# Patient Record
Sex: MALE | Race: WHITE | NOT HISPANIC OR LATINO | URBAN - METROPOLITAN AREA
[De-identification: names, ages, dates, MRNs, and addresses within clinical notes are randomized per-mention and may not be internally consistent; named-entity substitution may affect disease eponyms.]

---

## 2017-09-13 ENCOUNTER — INPATIENT (INPATIENT)
Facility: HOSPITAL | Age: 57
LOS: 8 days | Discharge: HOME | End: 2017-09-22
Attending: INTERNAL MEDICINE | Admitting: INTERNAL MEDICINE
Payer: COMMERCIAL

## 2017-09-13 VITALS — WEIGHT: 315 LBS | HEIGHT: 60 IN

## 2017-09-13 LAB
ADD ON TEST-SPECIMEN IN LAB: SIGNIFICANT CHANGE UP
ALBUMIN SERPL ELPH-MCNC: 3.1 G/DL — LOW (ref 3.3–5)
ALP SERPL-CCNC: 50 U/L — SIGNIFICANT CHANGE UP (ref 40–120)
ALT FLD-CCNC: 43 U/L — SIGNIFICANT CHANGE UP (ref 12–78)
ANION GAP SERPL CALC-SCNC: 7 MMOL/L — SIGNIFICANT CHANGE UP (ref 5–17)
APTT BLD: 28.4 SEC — SIGNIFICANT CHANGE UP (ref 27.5–37.4)
AST SERPL-CCNC: 21 U/L — SIGNIFICANT CHANGE UP (ref 15–37)
BASE EXCESS BLDV CALC-SCNC: -0.2 MMOL/L — SIGNIFICANT CHANGE UP (ref -2–2)
BASOPHILS # BLD AUTO: 0.1 K/UL — SIGNIFICANT CHANGE UP (ref 0–0.2)
BASOPHILS NFR BLD AUTO: 0.6 % — SIGNIFICANT CHANGE UP (ref 0–2)
BILIRUB SERPL-MCNC: 0.9 MG/DL — SIGNIFICANT CHANGE UP (ref 0.2–1.2)
BUN SERPL-MCNC: 19 MG/DL — SIGNIFICANT CHANGE UP (ref 7–23)
CALCIUM SERPL-MCNC: 8.1 MG/DL — LOW (ref 8.5–10.1)
CHLORIDE SERPL-SCNC: 108 MMOL/L — SIGNIFICANT CHANGE UP (ref 96–108)
CO2 SERPL-SCNC: 29 MMOL/L — SIGNIFICANT CHANGE UP (ref 22–31)
CREAT SERPL-MCNC: 1.18 MG/DL — SIGNIFICANT CHANGE UP (ref 0.5–1.3)
EOSINOPHIL # BLD AUTO: 0.1 K/UL — SIGNIFICANT CHANGE UP (ref 0–0.5)
EOSINOPHIL NFR BLD AUTO: 1 % — SIGNIFICANT CHANGE UP (ref 0–6)
GLUCOSE SERPL-MCNC: 132 MG/DL — HIGH (ref 70–99)
HCO3 BLDV-SCNC: 25 MMOL/L — SIGNIFICANT CHANGE UP (ref 21–29)
HCT VFR BLD CALC: 40.6 % — SIGNIFICANT CHANGE UP (ref 39–50)
HCT VFR BLD CALC: 43.7 % — SIGNIFICANT CHANGE UP (ref 39–50)
HGB BLD-MCNC: 12.9 G/DL — LOW (ref 13–17)
HGB BLD-MCNC: 14.1 G/DL — SIGNIFICANT CHANGE UP (ref 13–17)
INR BLD: 1.26 RATIO — HIGH (ref 0.88–1.16)
LYMPHOCYTES # BLD AUTO: 2.7 K/UL — SIGNIFICANT CHANGE UP (ref 1–3.3)
LYMPHOCYTES # BLD AUTO: 25.7 % — SIGNIFICANT CHANGE UP (ref 13–44)
MAGNESIUM SERPL-MCNC: 2.3 MG/DL — SIGNIFICANT CHANGE UP (ref 1.6–2.6)
MCHC RBC-ENTMCNC: 29.9 PG — SIGNIFICANT CHANGE UP (ref 27–34)
MCHC RBC-ENTMCNC: 30.4 PG — SIGNIFICANT CHANGE UP (ref 27–34)
MCHC RBC-ENTMCNC: 31.8 GM/DL — LOW (ref 32–36)
MCHC RBC-ENTMCNC: 32.2 GM/DL — SIGNIFICANT CHANGE UP (ref 32–36)
MCV RBC AUTO: 94.3 FL — SIGNIFICANT CHANGE UP (ref 80–100)
MCV RBC AUTO: 94.3 FL — SIGNIFICANT CHANGE UP (ref 80–100)
MONOCYTES # BLD AUTO: 0.7 K/UL — SIGNIFICANT CHANGE UP (ref 0–0.9)
MONOCYTES NFR BLD AUTO: 6.4 % — SIGNIFICANT CHANGE UP (ref 2–14)
NEUTROPHILS # BLD AUTO: 7.1 K/UL — SIGNIFICANT CHANGE UP (ref 1.8–7.4)
NEUTROPHILS NFR BLD AUTO: 66.3 % — SIGNIFICANT CHANGE UP (ref 43–77)
NT-PROBNP SERPL-SCNC: 1789 PG/ML — HIGH (ref 0–125)
PCO2 BLDV: 44 MMHG — SIGNIFICANT CHANGE UP (ref 35–50)
PH BLDV: 7.37 — SIGNIFICANT CHANGE UP (ref 7.35–7.45)
PHOSPHATE SERPL-MCNC: 3.5 MG/DL — SIGNIFICANT CHANGE UP (ref 2.5–4.5)
PLATELET # BLD AUTO: 215 K/UL — SIGNIFICANT CHANGE UP (ref 150–400)
PLATELET # BLD AUTO: 244 K/UL — SIGNIFICANT CHANGE UP (ref 150–400)
PO2 BLDV: 56 MMHG — HIGH (ref 25–45)
POTASSIUM SERPL-MCNC: 4.5 MMOL/L — SIGNIFICANT CHANGE UP (ref 3.5–5.3)
POTASSIUM SERPL-SCNC: 4.5 MMOL/L — SIGNIFICANT CHANGE UP (ref 3.5–5.3)
PROT SERPL-MCNC: 6.5 GM/DL — SIGNIFICANT CHANGE UP (ref 6–8.3)
PROTHROM AB SERPL-ACNC: 13.7 SEC — HIGH (ref 9.8–12.7)
RBC # BLD: 4.31 M/UL — SIGNIFICANT CHANGE UP (ref 4.2–5.8)
RBC # BLD: 4.64 M/UL — SIGNIFICANT CHANGE UP (ref 4.2–5.8)
RBC # FLD: 15.3 % — HIGH (ref 10.3–14.5)
RBC # FLD: 15.5 % — HIGH (ref 10.3–14.5)
SAO2 % BLDV: 84 % — SIGNIFICANT CHANGE UP (ref 67–88)
SODIUM SERPL-SCNC: 144 MMOL/L — SIGNIFICANT CHANGE UP (ref 135–145)
TROPONIN I SERPL-MCNC: 0.02 NG/ML — SIGNIFICANT CHANGE UP (ref 0.01–0.04)
TROPONIN I SERPL-MCNC: 0.02 NG/ML — SIGNIFICANT CHANGE UP (ref 0.01–0.04)
WBC # BLD: 10.6 K/UL — HIGH (ref 3.8–10.5)
WBC # BLD: 10.9 K/UL — HIGH (ref 3.8–10.5)
WBC # FLD AUTO: 10.6 K/UL — HIGH (ref 3.8–10.5)
WBC # FLD AUTO: 10.9 K/UL — HIGH (ref 3.8–10.5)

## 2017-09-13 PROCEDURE — 93010 ELECTROCARDIOGRAM REPORT: CPT

## 2017-09-13 PROCEDURE — 71010: CPT | Mod: 26

## 2017-09-13 PROCEDURE — 99291 CRITICAL CARE FIRST HOUR: CPT

## 2017-09-13 PROCEDURE — 93970 EXTREMITY STUDY: CPT | Mod: 26

## 2017-09-13 RX ORDER — DOCUSATE SODIUM 100 MG
100 CAPSULE ORAL THREE TIMES A DAY
Qty: 0 | Refills: 0 | Status: DISCONTINUED | OUTPATIENT
Start: 2017-09-13 | End: 2017-09-22

## 2017-09-13 RX ORDER — SODIUM CHLORIDE 9 MG/ML
3 INJECTION INTRAMUSCULAR; INTRAVENOUS; SUBCUTANEOUS ONCE
Qty: 0 | Refills: 0 | Status: COMPLETED | OUTPATIENT
Start: 2017-09-13 | End: 2017-09-13

## 2017-09-13 RX ORDER — HEPARIN SODIUM 5000 [USP'U]/ML
10000 INJECTION INTRAVENOUS; SUBCUTANEOUS EVERY 6 HOURS
Qty: 0 | Refills: 0 | Status: DISCONTINUED | OUTPATIENT
Start: 2017-09-13 | End: 2017-09-15

## 2017-09-13 RX ORDER — FUROSEMIDE 40 MG
40 TABLET ORAL
Qty: 0 | Refills: 0 | Status: DISCONTINUED | OUTPATIENT
Start: 2017-09-13 | End: 2017-09-22

## 2017-09-13 RX ORDER — HEPARIN SODIUM 5000 [USP'U]/ML
INJECTION INTRAVENOUS; SUBCUTANEOUS
Qty: 25000 | Refills: 0 | Status: DISCONTINUED | OUTPATIENT
Start: 2017-09-13 | End: 2017-09-15

## 2017-09-13 RX ORDER — HEPARIN SODIUM 5000 [USP'U]/ML
5000 INJECTION INTRAVENOUS; SUBCUTANEOUS EVERY 6 HOURS
Qty: 0 | Refills: 0 | Status: DISCONTINUED | OUTPATIENT
Start: 2017-09-13 | End: 2017-09-15

## 2017-09-13 RX ORDER — FUROSEMIDE 40 MG
40 TABLET ORAL
Qty: 0 | Refills: 0 | Status: DISCONTINUED | OUTPATIENT
Start: 2017-09-13 | End: 2017-09-13

## 2017-09-13 RX ORDER — ONDANSETRON 8 MG/1
4 TABLET, FILM COATED ORAL EVERY 6 HOURS
Qty: 0 | Refills: 0 | Status: DISCONTINUED | OUTPATIENT
Start: 2017-09-13 | End: 2017-09-22

## 2017-09-13 RX ORDER — DILTIAZEM HCL 120 MG
10 CAPSULE, EXT RELEASE 24 HR ORAL
Qty: 125 | Refills: 0 | Status: DISCONTINUED | OUTPATIENT
Start: 2017-09-13 | End: 2017-09-16

## 2017-09-13 RX ORDER — HEPARIN SODIUM 5000 [USP'U]/ML
10000 INJECTION INTRAVENOUS; SUBCUTANEOUS ONCE
Qty: 0 | Refills: 0 | Status: COMPLETED | OUTPATIENT
Start: 2017-09-13 | End: 2017-09-13

## 2017-09-13 RX ADMIN — Medication 40 MILLIGRAM(S): at 18:15

## 2017-09-13 RX ADMIN — HEPARIN SODIUM 10000 UNIT(S): 5000 INJECTION INTRAVENOUS; SUBCUTANEOUS at 16:59

## 2017-09-13 RX ADMIN — SODIUM CHLORIDE 3 MILLILITER(S): 9 INJECTION INTRAMUSCULAR; INTRAVENOUS; SUBCUTANEOUS at 15:51

## 2017-09-13 RX ADMIN — HEPARIN SODIUM 2400 UNIT(S)/HR: 5000 INJECTION INTRAVENOUS; SUBCUTANEOUS at 16:56

## 2017-09-13 RX ADMIN — Medication 10 MG/HR: at 15:48

## 2017-09-13 NOTE — ED STATDOCS - PROGRESS NOTE DETAILS
56 y/o male with a PMHx of obesity presents to the ED c/o SOB for 2 weeks.  He has recently lost a lot of weight and then gained it back.  +GREY.  He is holding onto a lot of fluid in trunk.  No daily meds.  PCP Dr. Harper.  NKDA.  Social drinker.  Nonsmoker.  EKG shows atrial fibrillation with RVRs, tachycardic, will transfer to main ED for further evaluation.

## 2017-09-13 NOTE — ED PROVIDER NOTE - CARE PLAN
Principal Discharge DX:	Atrial fibrillation with RVR  Secondary Diagnosis:	Acute congestive heart failure, unspecified congestive heart failure type

## 2017-09-13 NOTE — ED ADULT NURSE NOTE - OBJECTIVE STATEMENT
Pt is a 57y male, A & O x 3, presents to ED w/ SOB x 2 weeks , pt states he is retaining fluid, + bi lateral pedal edema, pt denies chest pain, pt was at PCP and EKG showed AFIB, pt neuro's intact, pt denies dizzness/weakness, pt in no apparent distress, will continue to monitor.

## 2017-09-13 NOTE — H&P ADULT - NSHPREVIEWOFSYSTEMS_GEN_ALL_CORE
(-)Fever, chills, cough, chest pain, headache, dizziness, palpitations, abd pain, n/v/d  (+)SOB, LE edema

## 2017-09-13 NOTE — H&P ADULT - HISTORY OF PRESENT ILLNESS
Patient is 58yo male with PMHx of morbid obesity, presents with SOB and Afib with RVR. Pt reports for the last 2 weeks despite a strict diet, he has noticed 15 lb weight gain, associated with worsening LE edema, and SOB at rest and on exertion. Pt denies fever, chills, chest pain, cough, palpitations, HA, dizziness. No other constitutional symptoms. In the Er patient noted to be in Afib with RVR 150s, give Cardizem IV  and placed drip, Heparin. Pt denies orthopnea, chest on exertion.

## 2017-09-13 NOTE — H&P ADULT - ASSESSMENT
56yo male a/w SOB, Afib with RVR    # SOB/Afib with RVR/Acute decompensated CHF, EF Uknown  - currently afebrile, HD stable, comfortable on 2LNC, -110s, on Cardizem 10mg/hr  - On exam lungs clear although LE edema 4+  - Duplex, r/o DVT  - ECHO  - Lasix IV BID  - Heparin drip, rate control with Cardizem drip  - low salt diet  - cardiology consult, Dr Francis  - cardiac enzymes neg x 2    # Obesity  - Check Lipid Panel, HgbA1C, TSH    # DVT ppx, Heparin drip    # Admit to tele

## 2017-09-13 NOTE — ED PROVIDER NOTE - MEDICAL DECISION MAKING DETAILS
Pt with new onset afib RVR and likely new onset CHF. Plan cardizem push and gtt, anticoagulate if not otherwise contraindicated, adm to tele

## 2017-09-13 NOTE — ED ADULT NURSE NOTE - CHPI ED SYMPTOMS NEG
no fever/no nausea/no dizziness/no syncope/no cough/no back pain/no chest pain/no vomiting/no chills/no diaphoresis

## 2017-09-13 NOTE — H&P ADULT - NSHPLABSRESULTS_GEN_ALL_CORE
CARDIAC MARKERS ( 13 Sep 2017 14:59 )  0.021 ng/mL / x     / x     / x     / x        EKG: Afib with RVR 150s, NSST changes    CXR: clear lungs                        14.1   10.6  )-----------( 244      ( 13 Sep 2017 14:59 )             43.7     13 Sep 2017 14:59    144    |  108    |  19     ----------------------------<  132    4.5     |  29     |  1.18     Ca    8.1        13 Sep 2017 14:59  Phos  3.5       13 Sep 2017 14:59  Mg     2.3       13 Sep 2017 14:59    TPro  6.5    /  Alb  3.1    /  TBili  0.9    /  DBili  x      /  AST  21     /  ALT  43     /  AlkPhos  50     13 Sep 2017 14:59    PT/INR - ( 13 Sep 2017 14:59 )   PT: 13.7 sec;   INR: 1.26 ratio         PTT - ( 13 Sep 2017 14:59 )  PTT:28.4 sec  CAPILLARY BLOOD GLUCOSE        LIVER FUNCTIONS - ( 13 Sep 2017 14:59 )  Alb: 3.1 g/dL / Pro: 6.5 gm/dL / ALK PHOS: 50 U/L / ALT: 43 U/L / AST: 21 U/L / GGT: x

## 2017-09-13 NOTE — ED PROVIDER NOTE - CARDIAC, MLM
Tachycardic rate, irregularly irregular rhythm.  3+ pitting edema b/l LE. Heart sounds S2.  No murmurs, rubs or gallops.

## 2017-09-13 NOTE — ED ADULT NURSE REASSESSMENT NOTE - NS ED NURSE REASSESS COMMENT FT1
Pt received in [facility]. Handoff given by LUIS Awad. Appears to be comfortable. A&Ox3. MAEX4. Breathing spontaneously on RA. No SOB or cough. VS as documented. PVL to B/L AC. Cardizem and Heparin drip in progress. Pt tolerating well. Pt obese. ABD soft, nondistended, nontender. +BS in all 4 quad. Voiding freely. Skin warm, dry, and intact. Pt to be admitted to tele. All needs are met and safety maintained. Will continue to monitor.
AS per MD Chairez, OK to keep Cardizem infusion at current rate 10/mg/hr

## 2017-09-13 NOTE — CONSULT NOTE ADULT - ASSESSMENT
Impression:  1. Congestive heart failure. Etiology to be determined. New onset.  2.Atrial fibrillation, new onset, rapid VR.  3.Possible hypertension  4.Morbid obesity.    Plan: Admit to the telemetry unit. Echocardiogram. Venous Doppler legs to r/o DVT. Consider nuclear stress testing.  Start IV furosemide, IV diltiazem and IV heparin.   Additional workup and treatment will depend on the results of testing and response to treatment already outlined.

## 2017-09-13 NOTE — ED PROVIDER NOTE - CRITICAL CARE PROVIDED
additional history taking/interpretation of diagnostic studies/consultation with other physicians/consult w/ pt's family directly relating to pts condition/documentation/direct patient care (not related to procedure)

## 2017-09-13 NOTE — ED PROVIDER NOTE - PROGRESS NOTE DETAILS
Attending Jina: Pt sent in from office with new onset afib RVR - given 20mg cardizem IVP followed by gtt at 10mL/hr. BP maintained throughout vasoactive infusions, pt denies any complaints. Heparin gtt started for full anticoagulation (pt not cardioversion candidate bc time of onset unknown). Cardiology cs with Dr Simpson initiated at request of Dr Clement. Pt will be admitted to tele

## 2017-09-13 NOTE — CONSULT NOTE ADULT - SUBJECTIVE AND OBJECTIVE BOX
Chief complaint: leg edema and shortness of breath.    HPI: 57 year old chronically obese man was well until about 2-4 weeks ago when he noted insidious onset of dyspnea on exertion and bilateral lower extremity edema. Over the past 2 weeks gained 15 pounds and developed bilateral weeping pedal edema and much worse dyspnea on exertion. He denies chest pain. His abdomen became bloated and expanded. He was evaluated tody by Dr. Clement as an outpatient and found to be in rapid atrial fibrillation and referred to the ED.   No prior history of heart failure, GREY, pedal edema. No history of MI, stroke, DM, hyperlipidemia, DVT, pulmonary embolism, arrhythmia. He has been told of borderline hypertension in the past but has never been on meds and not seen a physician for 10 years.     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 Drugs- none  Meds- none  allergies- NKDA  FH: MOther  83, CAD. Father  80, CAD 2 brothers and 1 sister alive and well-    PE: Alert and in no distress. /98 Pulse 110 irreg, irreg  HEENT- unremarkable  Neck- no bruit or JVD, no bruits. normal carotid upstrokes  Lungs- scant bibasilar crackles  Card- S1 and S2 normal. No murmur , rub or gallop  Abdomen- distended, non tender, massively obese, edema of the suprapubic area  Ext- 4+ weeping edema both legs. No calf tenderness.  Peripheral pulses-normal brachial,radial and femorals. Absent pedal pulses.  Neurological- no focal deficits    EKG- rapid atrial fibrillation,  BPM, nonspecific T abnormality  CXR- clear  LABS-  Troponin neg x 2. Pro BNP elevated.  Renal function normal

## 2017-09-13 NOTE — ED PROVIDER NOTE - CRITICAL CARE INDICATION, MLM
patient was critically ill... Patient was critically ill with a high probability of imminent or life threatening deterioration. Pt sent in from office with new onset afib RVR - given 20mg cardizem IVP followed by gtt at 10mL/hr. BP maintained throughout vasoactive infusions. Cardiology cs initiated in ED.

## 2017-09-13 NOTE — ED ADULT TRIAGE NOTE - CHIEF COMPLAINT QUOTE
pt sent to ED by Dr. Jason for SOB, CHF and rapid Afib. pt c/o SOB for the past 2 weeks, pt denies chest pain or dizziness. pt to be admitted

## 2017-09-13 NOTE — ED PROVIDER NOTE - OBJECTIVE STATEMENT
58 yo male h/o obesity, presents with gradually worsening SOB x 2 weeks. +Mild palpitations, edema in his trunk and legs. Has been on a calorie-restricted diet but notes weight gain recently. SOB is not worse when he lays flat. Denies CP, fever, N/V. Saw his PCP Dr. Clement today for the first time in 10 years. No daily meds. Seen in Intake, EKG shows rapid Afib.

## 2017-09-13 NOTE — PATIENT PROFILE ADULT. - NS TRANSFER PATIENT BELONGINGS
Electronic Device (specify)/sneakers, pants, shirt, underwear, iphone, ipad, microsoft surface, pads, paperwork, , bag,, toiletry bag,/Clothing/Cell Phone/PDA (specify)

## 2017-09-13 NOTE — H&P ADULT - NSHPPHYSICALEXAM_GEN_ALL_CORE
T(C): 36.5 (09-13-17 @ 14:18), Max: 36.5 (09-13-17 @ 14:18)  HR: 117 (09-13-17 @ 18:00) (79 - 128)  BP: 158/99 (09-13-17 @ 18:00) (126/70 - 158/99)  RR: 18 (09-13-17 @ 16:30) (17 - 20)  SpO2: 99% (09-13-17 @ 15:00) (96% - 100%)  Wt(kg): --    Gen: AAOx3, NAD, obese  HEENT: NCAT, EOMI  Neck: Supple, large  CV: nml S1S2, RRR  Lungs: CTABL  Abd: Soft, NT, ND, BS+  Ext: 4+ pitting edema b/l  Neuro: Non focal

## 2017-09-14 LAB
ANION GAP SERPL CALC-SCNC: 8 MMOL/L — SIGNIFICANT CHANGE UP (ref 5–17)
APTT BLD: 104.7 SEC — HIGH (ref 27.5–37.4)
APTT BLD: 75.9 SEC — HIGH (ref 27.5–37.4)
APTT BLD: 78.4 SEC — HIGH (ref 27.5–37.4)
APTT BLD: >200 SEC — CRITICAL HIGH (ref 27.5–37.4)
BASOPHILS # BLD AUTO: 0.2 K/UL — SIGNIFICANT CHANGE UP (ref 0–0.2)
BASOPHILS NFR BLD AUTO: 1.3 % — SIGNIFICANT CHANGE UP (ref 0–2)
BUN SERPL-MCNC: 17 MG/DL — SIGNIFICANT CHANGE UP (ref 7–23)
CALCIUM SERPL-MCNC: 8.4 MG/DL — LOW (ref 8.5–10.1)
CHLORIDE SERPL-SCNC: 104 MMOL/L — SIGNIFICANT CHANGE UP (ref 96–108)
CO2 SERPL-SCNC: 30 MMOL/L — SIGNIFICANT CHANGE UP (ref 22–31)
CREAT SERPL-MCNC: 1.08 MG/DL — SIGNIFICANT CHANGE UP (ref 0.5–1.3)
EOSINOPHIL # BLD AUTO: 0.3 K/UL — SIGNIFICANT CHANGE UP (ref 0–0.5)
EOSINOPHIL NFR BLD AUTO: 2.1 % — SIGNIFICANT CHANGE UP (ref 0–6)
GLUCOSE SERPL-MCNC: 110 MG/DL — HIGH (ref 70–99)
HBA1C BLD-MCNC: 6.2 % — HIGH (ref 4–5.6)
HCT VFR BLD CALC: 45.2 % — SIGNIFICANT CHANGE UP (ref 39–50)
HGB BLD-MCNC: 14.6 G/DL — SIGNIFICANT CHANGE UP (ref 13–17)
INR BLD: 1.23 RATIO — HIGH (ref 0.88–1.16)
INR BLD: 1.33 RATIO — HIGH (ref 0.88–1.16)
LYMPHOCYTES # BLD AUTO: 27.7 % — SIGNIFICANT CHANGE UP (ref 13–44)
LYMPHOCYTES # BLD AUTO: 3.4 K/UL — HIGH (ref 1–3.3)
MCHC RBC-ENTMCNC: 30 PG — SIGNIFICANT CHANGE UP (ref 27–34)
MCHC RBC-ENTMCNC: 32.3 GM/DL — SIGNIFICANT CHANGE UP (ref 32–36)
MCV RBC AUTO: 92.9 FL — SIGNIFICANT CHANGE UP (ref 80–100)
MONOCYTES # BLD AUTO: 0.9 K/UL — SIGNIFICANT CHANGE UP (ref 0–0.9)
MONOCYTES NFR BLD AUTO: 6.9 % — SIGNIFICANT CHANGE UP (ref 2–14)
NEUTROPHILS # BLD AUTO: 7.6 K/UL — HIGH (ref 1.8–7.4)
NEUTROPHILS NFR BLD AUTO: 62 % — SIGNIFICANT CHANGE UP (ref 43–77)
PLATELET # BLD AUTO: 235 K/UL — SIGNIFICANT CHANGE UP (ref 150–400)
POTASSIUM SERPL-MCNC: 3.5 MMOL/L — SIGNIFICANT CHANGE UP (ref 3.5–5.3)
POTASSIUM SERPL-SCNC: 3.5 MMOL/L — SIGNIFICANT CHANGE UP (ref 3.5–5.3)
PROTHROM AB SERPL-ACNC: 13.3 SEC — HIGH (ref 9.8–12.7)
PROTHROM AB SERPL-ACNC: 14.4 SEC — HIGH (ref 9.8–12.7)
RBC # BLD: 4.86 M/UL — SIGNIFICANT CHANGE UP (ref 4.2–5.8)
RBC # FLD: 15.9 % — HIGH (ref 10.3–14.5)
SODIUM SERPL-SCNC: 142 MMOL/L — SIGNIFICANT CHANGE UP (ref 135–145)
TSH SERPL-MCNC: 1.3 UIU/ML — SIGNIFICANT CHANGE UP (ref 0.36–3.74)
WBC # BLD: 12.3 K/UL — HIGH (ref 3.8–10.5)
WBC # FLD AUTO: 12.3 K/UL — HIGH (ref 3.8–10.5)

## 2017-09-14 PROCEDURE — 93016 CV STRESS TEST SUPVJ ONLY: CPT

## 2017-09-14 PROCEDURE — 93018 CV STRESS TEST I&R ONLY: CPT

## 2017-09-14 PROCEDURE — 93306 TTE W/DOPPLER COMPLETE: CPT | Mod: 26

## 2017-09-14 PROCEDURE — 93010 ELECTROCARDIOGRAM REPORT: CPT

## 2017-09-14 RX ORDER — DIPYRIDAMOLE 50 MG
60 TABLET ORAL ONCE
Qty: 0 | Refills: 0 | Status: COMPLETED | OUTPATIENT
Start: 2017-09-14 | End: 2017-09-14

## 2017-09-14 RX ORDER — INFLUENZA VIRUS VACCINE 15; 15; 15; 15 UG/.5ML; UG/.5ML; UG/.5ML; UG/.5ML
0.5 SUSPENSION INTRAMUSCULAR ONCE
Qty: 0 | Refills: 0 | Status: COMPLETED | OUTPATIENT
Start: 2017-09-14 | End: 2017-09-15

## 2017-09-14 RX ORDER — AMINOPHYLLINE 100 MG
100 TABLET ORAL ONCE
Qty: 0 | Refills: 0 | Status: DISCONTINUED | OUTPATIENT
Start: 2017-09-14 | End: 2017-09-22

## 2017-09-14 RX ADMIN — Medication 40 MILLIGRAM(S): at 04:59

## 2017-09-14 RX ADMIN — HEPARIN SODIUM 2000 UNIT(S)/HR: 5000 INJECTION INTRAVENOUS; SUBCUTANEOUS at 01:11

## 2017-09-14 RX ADMIN — HEPARIN SODIUM 1700 UNIT(S)/HR: 5000 INJECTION INTRAVENOUS; SUBCUTANEOUS at 22:30

## 2017-09-14 RX ADMIN — Medication 10 MG/HR: at 04:59

## 2017-09-14 RX ADMIN — HEPARIN SODIUM 0 UNIT(S)/HR: 5000 INJECTION INTRAVENOUS; SUBCUTANEOUS at 00:08

## 2017-09-14 RX ADMIN — Medication 1 TABLET(S): at 11:43

## 2017-09-14 RX ADMIN — Medication 40 MILLIGRAM(S): at 17:51

## 2017-09-14 RX ADMIN — HEPARIN SODIUM 1700 UNIT(S)/HR: 5000 INJECTION INTRAVENOUS; SUBCUTANEOUS at 07:44

## 2017-09-14 RX ADMIN — Medication 600 MILLIGRAM(S): at 09:00

## 2017-09-14 RX ADMIN — HEPARIN SODIUM 1700 UNIT(S)/HR: 5000 INJECTION INTRAVENOUS; SUBCUTANEOUS at 14:52

## 2017-09-14 RX ADMIN — Medication 10 MG/HR: at 19:22

## 2017-09-14 NOTE — PROGRESS NOTE ADULT - SUBJECTIVE AND OBJECTIVE BOX
Patient is a 57y old  Male who presents with a chief complaint of I was sick, I had trouble breathing, severely bloated, fatigued, gaining weight (13 Sep 2017 22:00)  Chief complaint: leg edema and shortness of breath.    HPI: 57 year old chronically obese man was well until about 2-4 weeks ago when he noted insidious onset of dyspnea on exertion and bilateral lower extremity edema. Over the past 2 weeks gained 15 pounds and developed bilateral weeping pedal edema and much worse dyspnea on exertion. He denies chest pain. His abdomen became bloated and expanded. He was evaluated tody by Dr. Clement as an outpatient and found to be in rapid atrial fibrillation and referred to the ED.   No prior history of heart failure, GREY, pedal edema. No history of MI, stroke, DM, hyperlipidemia, DVT, pulmonary embolism, arrhythmia. He has been told of borderline hypertension in the past but has never been on meds and not seen a physician for 10 years.     Followup HPI:  - Les breathlessness and abdominal bloating.. No chest pain.        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 Drugs- none  Meds- none  allergies- NKDA  FH: MOther  83, CAD. Father  80, CAD 2 brothers and 1 sister alive and well-    Review of symptoms:  Negative except for as noted in today's HPI.      MEDICATIONS  (STANDING):  diltiazem Infusion 10 mG/Hr (10 mL/Hr) IV Continuous <Continuous>  heparin  Infusion.  Unit(s)/Hr (24 mL/Hr) IV Continuous <Continuous>  docusate sodium 100 milliGRAM(s) Oral three times a day  multivitamin 1 Tablet(s) Oral daily  furosemide   Injectable 40 milliGRAM(s) IV Push two times a day  influenza   Vaccine 0.5 milliLiter(s) IntraMuscular once    MEDICATIONS  (PRN):  heparin  Injectable 06696 Unit(s) IV Push every 6 hours PRN For aPTT less than 40  heparin  Injectable 5000 Unit(s) IV Push every 6 hours PRN For aPTT between 40 - 57  ondansetron Injectable 4 milliGRAM(s) IV Push every 6 hours PRN Nausea          Vital Signs Last 24 Hrs  T(C): 36.3 (14 Sep 2017 04:17), Max: 36.7 (13 Sep 2017 19:39)  T(F): 97.4 (14 Sep 2017 04:17), Max: 98.1 (13 Sep 2017 19:39)  HR: 85 (14 Sep 2017 06:32) (76 - 128)  BP: 126/90 (14 Sep 2017 06:32) (98/61 - 159/89)  BP(mean): 94 (13 Sep 2017 19:39) (94 - 94)  RR: 16 (14 Sep 2017 06:32) (16 - 21)  SpO2: 97% (14 Sep 2017 06:32) (95% - 100%)    I&O's Summary    13 Sep 2017 07:01  -  14 Sep 2017 07:00  --------------------------------------------------------  IN: 853 mL / OUT: 1800 mL / NET: -947 mL        PHYSICAL EXAM  General Appearance:comfortable  HEENT:   Neck: no JVD  Lungs: minimal bibasilar crackles  Heart: S1 and S2 normal. No murmur, rub or gallop  Abdomen: obese  Extremities: 3-4+ edema both legs  Neurologic: no focal deficit    INTERPRETATION OF TELEMETRY: atrial fib 70s-110    ECG: pending  Venous Doppler neg for DVT bilaterally    LABS:                          12.9   10.9  )-----------( 215      ( 13 Sep 2017 22:44 )             40.6     09-13    144  |  108  |  19  ----------------------------<  132<H>  4.5   |  29  |  1.18    Ca    8.1<L>      13 Sep 2017 14:59  Phos  3.5     09-13  Mg     2.3     09-13    TPro  6.5  /  Alb  3.1<L>  /  TBili  0.9  /  DBili  x   /  AST  21  /  ALT  43  /  AlkPhos  50  09-13    CARDIAC MARKERS ( 13 Sep 2017 14:59 )  0.021 ng/mL / x     / x     / x     / x            Pro BNP  1789  @ 14:59  D Dimer  --  @ 14:59    PT/INR - ( 13 Sep 2017 22:44 )   PT: 14.4 sec;   INR: 1.33 ratio         PTT - ( 13 Sep 2017 22:44 )  PTT:>200.0 sec          RADIOLOGY & ADDITIONAL STUDIES:    IMPRESSION:  1. Congestive heart failure. Etiology to be determined. New onset. Clinically improved with diuresis and heart rate control.  2.Atrial fibrillation, new onset, rapid VR. Rate control better on IV diltiazem  3.Possible hypertension  4.Morbid obesity.  PLAN:  Echocardiogram today. Consider Persantine MPI scan if body size will allow. Consider WENDI guided electrical cardioversion after echo resulted.  Continue IV furosemide, heparin, diltiazem.

## 2017-09-14 NOTE — DIETITIAN INITIAL EVALUATION ADULT. - OTHER INFO
Nutrition consult for enteral nutrition. Nutrition consult for enteral nutrition. Consult for enteral nutrition not appropriate pt on po diet and tolerating well with ~100% intake at this time. Skin intact however Severe edema (weeping) noted. No diet followed PTA consumes high sodium/fat/cholesterol/calorie foods daily. BMI indicates morbidly obese. Discussed lifestyle change and therapeutic diet (DASH/TLC). Educational materials provided and all questions answered. RD contact information provided for further questions or concerns. No further intervention warranted at this time. Suggest outpatient counseling for follow up.

## 2017-09-14 NOTE — PROGRESS NOTE ADULT - SUBJECTIVE AND OBJECTIVE BOX
Patient is 58yo male with PMHx of morbid obesity, presents with SOB and Afib with RVR. Pt reports for the last 2 weeks despite a strict diet, he has noticed 15 lb weight gain, associated with worsening LE edema, and SOB at rest and on exertion. Pt denies fever, chills, chest pain, cough, palpitations, HA, dizziness. No other constitutional symptoms. In the Er patient noted to be in Afib with RVR 150s, give Cardizem IV  and placed drip, Heparin. Pt denies orthopnea, chest on exertion.       09/14/17: Patient seen and examined. He denies any new complaints. SOB improving. Had stress test earlier. Discussed with patient in length regarding management and d/c plan.     Vital Signs Last 24 Hrs  T(C): 36.7 (14 Sep 2017 10:53), Max: 36.7 (13 Sep 2017 19:39)  T(F): 98.1 (14 Sep 2017 10:53), Max: 98.1 (13 Sep 2017 19:39)  HR: 86 (14 Sep 2017 13:46) (75 - 128)  BP: 118/76 (14 Sep 2017 13:46) (98/61 - 159/89)  BP(mean): 94 (13 Sep 2017 19:39) (94 - 94)  RR: 18 (14 Sep 2017 10:53) (16 - 21)  SpO2: 96% (14 Sep 2017 10:53) (95% - 100%)          P/E:     Gen: AAOx3, NAD, obese  	HEENT: NCAT, EOMI  	Neck: Supple, no JVD or goitre.  	CV: nml S1S2, RRR  	Lungs: b/l basal rales  	Abd: Soft, NT, ND, BS+, obese  	Ext: 4+ pitting edema b/l  Neuro: Non focal, AAOX3        Labs:                           14.6   12.3  )-----------( 235      ( 14 Sep 2017 06:57 )             45.2     14 Sep 2017 06:57    142    |  104    |  17     ----------------------------<  110    3.5     |  30     |  1.08     Ca    8.4        14 Sep 2017 06:57  Phos  3.5       13 Sep 2017 14:59  Mg     2.3       13 Sep 2017 14:59    TPro  6.5    /  Alb  3.1    /  TBili  0.9    /  DBili  x      /  AST  21     /  ALT  43     /  AlkPhos  50     13 Sep 2017 14:59    LIVER FUNCTIONS - ( 13 Sep 2017 14:59 )  Alb: 3.1 g/dL / Pro: 6.5 gm/dL / ALK PHOS: 50 U/L / ALT: 43 U/L / AST: 21 U/L / GGT: x           PT/INR - ( 14 Sep 2017 06:57 )   PT: 13.3 sec;   INR: 1.23 ratio         PTT - ( 14 Sep 2017 06:57 )  PTT:104.7 sec  CAPILLARY BLOOD GLUCOSE        CARDIAC MARKERS ( 13 Sep 2017 14:59 )  0.021 ng/mL / x     / x     / x     / x                MEDICATIONS  (STANDING):  diltiazem Infusion 10 mG/Hr (10 mL/Hr) IV Continuous <Continuous>  heparin  Infusion.  Unit(s)/Hr (24 mL/Hr) IV Continuous <Continuous>  docusate sodium 100 milliGRAM(s) Oral three times a day  multivitamin 1 Tablet(s) Oral daily  furosemide   Injectable 40 milliGRAM(s) IV Push two times a day  influenza   Vaccine 0.5 milliLiter(s) IntraMuscular once  aminophylline  Injectable 100 milliGRAM(s) IV Push once    MEDICATIONS  (PRN):  heparin  Injectable 52655 Unit(s) IV Push every 6 hours PRN For aPTT less than 40  heparin  Injectable 5000 Unit(s) IV Push every 6 hours PRN For aPTT between 40 - 57  ondansetron Injectable 4 milliGRAM(s) IV Push every 6 hours PRN Nausea                Assessment and Plan:   Assessment:  · Assessment		  58yo male a/w SOB, Afib with RVR    # SOB/Afib with RVR/Acute decompensated CHF, EF Uknown  Continue cardizem drip  Dr holt consult appreciated  - Follow ECHO  -Continue Lasix IV BID  - Heparin drip, rate control with Cardizem drip  - low salt diet  -Daily weight  Fluid restrictions  Follow stress report  - cardiac enzymes neg x 2    # Obesity  - Nutrition consult    # Borderline DM-  Counselled    # DVT ppx, Heparin drip

## 2017-09-15 LAB
ANION GAP SERPL CALC-SCNC: 3 MMOL/L — LOW (ref 5–17)
APTT BLD: 151.2 SEC — CRITICAL HIGH (ref 27.5–37.4)
BUN SERPL-MCNC: 16 MG/DL — SIGNIFICANT CHANGE UP (ref 7–23)
CALCIUM SERPL-MCNC: 8.6 MG/DL — SIGNIFICANT CHANGE UP (ref 8.5–10.1)
CHLORIDE SERPL-SCNC: 103 MMOL/L — SIGNIFICANT CHANGE UP (ref 96–108)
CO2 SERPL-SCNC: 33 MMOL/L — HIGH (ref 22–31)
CREAT SERPL-MCNC: 0.97 MG/DL — SIGNIFICANT CHANGE UP (ref 0.5–1.3)
GLUCOSE SERPL-MCNC: 105 MG/DL — HIGH (ref 70–99)
HCT VFR BLD CALC: 42.5 % — SIGNIFICANT CHANGE UP (ref 39–50)
HGB BLD-MCNC: 14.1 G/DL — SIGNIFICANT CHANGE UP (ref 13–17)
MCHC RBC-ENTMCNC: 30.1 PG — SIGNIFICANT CHANGE UP (ref 27–34)
MCHC RBC-ENTMCNC: 33.2 GM/DL — SIGNIFICANT CHANGE UP (ref 32–36)
MCV RBC AUTO: 90.9 FL — SIGNIFICANT CHANGE UP (ref 80–100)
PLATELET # BLD AUTO: 229 K/UL — SIGNIFICANT CHANGE UP (ref 150–400)
POTASSIUM SERPL-MCNC: 3.4 MMOL/L — LOW (ref 3.5–5.3)
POTASSIUM SERPL-SCNC: 3.4 MMOL/L — LOW (ref 3.5–5.3)
RBC # BLD: 4.68 M/UL — SIGNIFICANT CHANGE UP (ref 4.2–5.8)
RBC # FLD: 15.9 % — HIGH (ref 10.3–14.5)
SODIUM SERPL-SCNC: 139 MMOL/L — SIGNIFICANT CHANGE UP (ref 135–145)
WBC # BLD: 11 K/UL — HIGH (ref 3.8–10.5)
WBC # FLD AUTO: 11 K/UL — HIGH (ref 3.8–10.5)

## 2017-09-15 PROCEDURE — 78452 HT MUSCLE IMAGE SPECT MULT: CPT | Mod: 26

## 2017-09-15 PROCEDURE — 93010 ELECTROCARDIOGRAM REPORT: CPT

## 2017-09-15 RX ORDER — RIVAROXABAN 15 MG-20MG
20 KIT ORAL EVERY 24 HOURS
Qty: 0 | Refills: 0 | Status: DISCONTINUED | OUTPATIENT
Start: 2017-09-15 | End: 2017-09-22

## 2017-09-15 RX ORDER — LISINOPRIL 2.5 MG/1
5 TABLET ORAL DAILY
Qty: 0 | Refills: 0 | Status: DISCONTINUED | OUTPATIENT
Start: 2017-09-15 | End: 2017-09-22

## 2017-09-15 RX ORDER — METOPROLOL TARTRATE 50 MG
25 TABLET ORAL
Qty: 0 | Refills: 0 | Status: DISCONTINUED | OUTPATIENT
Start: 2017-09-15 | End: 2017-09-19

## 2017-09-15 RX ORDER — POTASSIUM CHLORIDE 20 MEQ
40 PACKET (EA) ORAL DAILY
Qty: 0 | Refills: 0 | Status: DISCONTINUED | OUTPATIENT
Start: 2017-09-15 | End: 2017-09-22

## 2017-09-15 RX ORDER — RIVAROXABAN 15 MG-20MG
20 KIT ORAL EVERY 24 HOURS
Qty: 0 | Refills: 0 | Status: DISCONTINUED | OUTPATIENT
Start: 2017-09-15 | End: 2017-09-15

## 2017-09-15 RX ADMIN — Medication 40 MILLIGRAM(S): at 17:22

## 2017-09-15 RX ADMIN — INFLUENZA VIRUS VACCINE 0.5 MILLILITER(S): 15; 15; 15; 15 SUSPENSION INTRAMUSCULAR at 18:08

## 2017-09-15 RX ADMIN — LISINOPRIL 5 MILLIGRAM(S): 2.5 TABLET ORAL at 14:14

## 2017-09-15 RX ADMIN — RIVAROXABAN 20 MILLIGRAM(S): KIT at 17:22

## 2017-09-15 RX ADMIN — HEPARIN SODIUM 0 UNIT(S)/HR: 5000 INJECTION INTRAVENOUS; SUBCUTANEOUS at 07:37

## 2017-09-15 RX ADMIN — Medication 1 TABLET(S): at 11:03

## 2017-09-15 RX ADMIN — Medication 40 MILLIEQUIVALENT(S): at 14:14

## 2017-09-15 RX ADMIN — Medication 10 MG/HR: at 06:34

## 2017-09-15 RX ADMIN — Medication 25 MILLIGRAM(S): at 11:03

## 2017-09-15 RX ADMIN — Medication 25 MILLIGRAM(S): at 17:22

## 2017-09-15 RX ADMIN — Medication 40 MILLIGRAM(S): at 05:19

## 2017-09-15 NOTE — PROGRESS NOTE ADULT - SUBJECTIVE AND OBJECTIVE BOX
Patient is a 57y old  Male who presents with a chief complaint of I was sick, I had trouble breathing, severely bloated, fatigued, gaining weight (13 Sep 2017 22:00)  Chief complaint: leg edema and shortness of breath.    HPI: 57 year old chronically obese man was well until about 2-4 weeks ago when he noted insidious onset of dyspnea on exertion and bilateral lower extremity edema. Over the past 2 weeks gained 15 pounds and developed bilateral weeping pedal edema and much worse dyspnea on exertion. He denies chest pain. His abdomen became bloated and expanded. He was evaluated tody by Dr. Clement as an outpatient and found to be in rapid atrial fibrillation and referred to the ED.   No prior history of heart failure, GREY, pedal edema. No history of MI, stroke, DM, hyperlipidemia, DVT, pulmonary embolism, arrhythmia. He has been told of borderline hypertension in the past but has never been on meds and not seen a physician for 10 years.       Followup HPI:  - Less breathlessness and abdominal bloating.. No chest pain.  9/15-Much less breathless. Abdominal bloating resolved.     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 Drugs- none  Meds- none  allergies- NKDA  FH: MOther  83, CAD. Father  80, CAD 2 brothers and 1 sister alive and well-    Review of symptoms:  Negative except for as noted in today's HPI.      MEDICATIONS  (STANDING):  diltiazem Infusion 10 mG/Hr (10 mL/Hr) IV Continuous <Continuous>  heparin  Infusion.  Unit(s)/Hr (24 mL/Hr) IV Continuous <Continuous>  docusate sodium 100 milliGRAM(s) Oral three times a day  multivitamin 1 Tablet(s) Oral daily  furosemide   Injectable 40 milliGRAM(s) IV Push two times a day  influenza   Vaccine 0.5 milliLiter(s) IntraMuscular once  aminophylline  Injectable 100 milliGRAM(s) IV Push once    MEDICATIONS  (PRN):  heparin  Injectable 38776 Unit(s) IV Push every 6 hours PRN For aPTT less than 40  heparin  Injectable 5000 Unit(s) IV Push every 6 hours PRN For aPTT between 40 - 57  ondansetron Injectable 4 milliGRAM(s) IV Push every 6 hours PRN Nausea          Vital Signs Last 24 Hrs  T(C): 36.8 (14 Sep 2017 20:58), Max: 36.8 (14 Sep 2017 20:58)  T(F): 98.3 (14 Sep 2017 20:58), Max: 98.3 (14 Sep 2017 20:58)  HR: 79 (14 Sep 2017 22:45) (75 - 126)  BP: 133/80 (14 Sep 2017 22:45) (117/65 - 133/80)  BP(mean): --  RR: 17 (14 Sep 2017 20:58) (16 - 18)  SpO2: 94% (14 Sep 2017 20:58) (94% - 97%)    I&O's Summary      PHYSICAL EXAM  General Appearance: comfortable  HEENT:   Neck:   Lungs: clear  Heart: S1 and S2 normal. No murmur, rub, gallop  Abdomen: obese  Extremities: 3 + edema both legs  Neurologic: normal      INTERPRETATION OF TELEMETRY: A fib rate 70s-110  TTE- technically limited, normal LV systolic function, moderate MR, mild to mod TR, mild PAH, mod MICHAEL, RVE and LAE, no pericardial effusion.  ECG: pending    LABS:                          14.1   11.0  )-----------( 229      ( 15 Sep 2017 06:11 )             42.5     09-14    142  |  104  |  17  ----------------------------<  110<H>  3.5   |  30  |  1.08    Ca    8.4<L>      14 Sep 2017 06:57  Phos  3.5     -13  Mg     2.3     -13    TPro  6.5  /  Alb  3.1<L>  /  TBili  0.9  /  DBili  x   /  AST  21  /  ALT  43  /  AlkPhos  50  09-13    CARDIAC MARKERS ( 13 Sep 2017 14:59 )  0.021 ng/mL / x     / x     / x     / x            Pro BNP  1789  @ 14:59  D Dimer  --  @ 14:59    PT/INR - ( 14 Sep 2017 06:57 )   PT: 13.3 sec;   INR: 1.23 ratio         PTT - ( 14 Sep 2017 21:31 )  PTT:78.4 sec          RADIOLOGY & ADDITIONAL STUDIES:    IMPRESSION:  1. Congestive heart failure with preserved LV systolic function. May be due to persistent atrial fibrillation.  2. Atrial fib with fair rate control.  2. Morbid obesity.    PLAN:  To have second portion of Persantine MPI scan today. Schedule WENDI guided electrical cardioversion.  Continue IV furosemide. Change IV heparin to Xarelto. Start po metoprolol for rate control and taper IV cardizem as the heart rate declines.

## 2017-09-15 NOTE — PROGRESS NOTE ADULT - SUBJECTIVE AND OBJECTIVE BOX
Patient is 58yo male with PMHx of morbid obesity, presents with SOB and Afib with RVR. Pt reports for the last 2 weeks despite a strict diet, he has noticed 15 lb weight gain, associated with worsening LE edema, and SOB at rest and on exertion. Pt denies fever, chills, chest pain, cough, palpitations, HA, dizziness. No other constitutional symptoms. In the Er patient noted to be in Afib with RVR 150s, give Cardizem IV  and placed drip, Heparin. Pt denies orthopnea, chest on exertion.       09/14/17: Patient seen and examined. He denies any new complaints. SOB improving. Had stress test earlier. Discussed with patient in length regarding management and d/c plan.   09/15/17: Patient seen and examined. He feels better today. Still on cardizem drip. Discussed with patient in length regarding management and d/c plan. Discussed with Dr Holt.    Vital Signs Last 24 Hrs  T(C): 36.4 (15 Sep 2017 09:55), Max: 36.8 (14 Sep 2017 20:58)  T(F): 97.6 (15 Sep 2017 09:55), Max: 98.3 (14 Sep 2017 20:58)  HR: 79 (15 Sep 2017 09:55) (79 - 126)  BP: 149/95 (15 Sep 2017 09:55) (117/65 - 149/95)  BP(mean): --  RR: 17 (15 Sep 2017 09:55) (16 - 17)  SpO2: 95% (15 Sep 2017 09:55) (94% - 97%)          P/E:     Gen: AAOx3, NAD, obese  	HEENT: NCAT, EOMI  	Neck: Supple, no JVD or goitre.  	CV: nml S1S2, RRR  	Lungs: b/l basal rales  	Abd: Soft, NT, ND, BS+, obese  	Ext: 4+ pitting edema b/l  Neuro: Non focal, AAOX3        Labs:                                         14.1   11.0  )-----------( 229      ( 15 Sep 2017 06:11 )             42.5     15 Sep 2017 06:11    139    |  103    |  16     ----------------------------<  105    3.4     |  33     |  0.97     Ca    8.6        15 Sep 2017 06:11  Phos  3.5       13 Sep 2017 14:59  Mg     2.3       13 Sep 2017 14:59    TPro  6.5    /  Alb  3.1    /  TBili  0.9    /  DBili  x      /  AST  21     /  ALT  43     /  AlkPhos  50     13 Sep 2017 14:59    LIVER FUNCTIONS - ( 13 Sep 2017 14:59 )  Alb: 3.1 g/dL / Pro: 6.5 gm/dL / ALK PHOS: 50 U/L / ALT: 43 U/L / AST: 21 U/L / GGT: x           PT/INR - ( 14 Sep 2017 06:57 )   PT: 13.3 sec;   INR: 1.23 ratio         PTT - ( 15 Sep 2017 06:11 )  PTT:151.2 sec  CAPILLARY BLOOD GLUCOSE        CARDIAC MARKERS ( 13 Sep 2017 14:59 )  0.021 ng/mL / x     / x     / x     / x                            MEDICATIONS  (STANDING):  diltiazem Infusion 10 mG/Hr (10 mL/Hr) IV Continuous <Continuous>  docusate sodium 100 milliGRAM(s) Oral three times a day  multivitamin 1 Tablet(s) Oral daily  furosemide   Injectable 40 milliGRAM(s) IV Push two times a day  influenza   Vaccine 0.5 milliLiter(s) IntraMuscular once  aminophylline  Injectable 100 milliGRAM(s) IV Push once  metoprolol 25 milliGRAM(s) Oral four times a day  rivaroxaban 20 milliGRAM(s) Oral every 24 hours    MEDICATIONS  (PRN):  ondansetron Injectable 4 milliGRAM(s) IV Push every 6 hours PRN Nausea                  Assessment and Plan:   Assessment:  · Assessment		  58yo male a/w SOB, Afib with RVR    # SOB/Afib with RVR/Acute decompensated systolic CHF-EF 38% on stress test  Continue cardizem drip, taper  Dr holt follow up appreciated  -Continue Lasix IV BID  - Heparin drip discontinued and started on xarelto  - low salt diet  -Daily weight  Fluid restrictions  Stress report: no ischemia, consider cardiac cath  Possible WENDI and DCCV on Monday    #Hypokalemia-due to IV lasix  Replace  Follow     # Obesity  Possibly OMAYRA    # Borderline DM-  Counselled    # DVT ppx,   On xarelto

## 2017-09-16 LAB
ANION GAP SERPL CALC-SCNC: 5 MMOL/L — SIGNIFICANT CHANGE UP (ref 5–17)
BUN SERPL-MCNC: 18 MG/DL — SIGNIFICANT CHANGE UP (ref 7–23)
CALCIUM SERPL-MCNC: 8.4 MG/DL — LOW (ref 8.5–10.1)
CHLORIDE SERPL-SCNC: 101 MMOL/L — SIGNIFICANT CHANGE UP (ref 96–108)
CO2 SERPL-SCNC: 33 MMOL/L — HIGH (ref 22–31)
CREAT SERPL-MCNC: 1.02 MG/DL — SIGNIFICANT CHANGE UP (ref 0.5–1.3)
GLUCOSE SERPL-MCNC: 96 MG/DL — SIGNIFICANT CHANGE UP (ref 70–99)
HCT VFR BLD CALC: 42.5 % — SIGNIFICANT CHANGE UP (ref 39–50)
HGB BLD-MCNC: 13.6 G/DL — SIGNIFICANT CHANGE UP (ref 13–17)
MAGNESIUM SERPL-MCNC: 2.2 MG/DL — SIGNIFICANT CHANGE UP (ref 1.6–2.6)
MCHC RBC-ENTMCNC: 30.1 PG — SIGNIFICANT CHANGE UP (ref 27–34)
MCHC RBC-ENTMCNC: 32 GM/DL — SIGNIFICANT CHANGE UP (ref 32–36)
MCV RBC AUTO: 93.8 FL — SIGNIFICANT CHANGE UP (ref 80–100)
PLATELET # BLD AUTO: 235 K/UL — SIGNIFICANT CHANGE UP (ref 150–400)
POTASSIUM SERPL-MCNC: 4.1 MMOL/L — SIGNIFICANT CHANGE UP (ref 3.5–5.3)
POTASSIUM SERPL-SCNC: 4.1 MMOL/L — SIGNIFICANT CHANGE UP (ref 3.5–5.3)
RBC # BLD: 4.53 M/UL — SIGNIFICANT CHANGE UP (ref 4.2–5.8)
RBC # FLD: 15.4 % — HIGH (ref 10.3–14.5)
SODIUM SERPL-SCNC: 139 MMOL/L — SIGNIFICANT CHANGE UP (ref 135–145)
WBC # BLD: 11 K/UL — HIGH (ref 3.8–10.5)
WBC # FLD AUTO: 11 K/UL — HIGH (ref 3.8–10.5)

## 2017-09-16 RX ORDER — SPIRONOLACTONE 25 MG/1
25 TABLET, FILM COATED ORAL DAILY
Qty: 0 | Refills: 0 | Status: DISCONTINUED | OUTPATIENT
Start: 2017-09-16 | End: 2017-09-22

## 2017-09-16 RX ADMIN — Medication 25 MILLIGRAM(S): at 05:44

## 2017-09-16 RX ADMIN — Medication 25 MILLIGRAM(S): at 00:20

## 2017-09-16 RX ADMIN — Medication 25 MILLIGRAM(S): at 23:39

## 2017-09-16 RX ADMIN — Medication 25 MILLIGRAM(S): at 18:08

## 2017-09-16 RX ADMIN — Medication 1 TABLET(S): at 12:26

## 2017-09-16 RX ADMIN — RIVAROXABAN 20 MILLIGRAM(S): KIT at 18:08

## 2017-09-16 RX ADMIN — Medication 10 MG/HR: at 01:42

## 2017-09-16 RX ADMIN — LISINOPRIL 5 MILLIGRAM(S): 2.5 TABLET ORAL at 05:44

## 2017-09-16 RX ADMIN — Medication 25 MILLIGRAM(S): at 12:26

## 2017-09-16 RX ADMIN — Medication 40 MILLIGRAM(S): at 05:44

## 2017-09-16 RX ADMIN — Medication 40 MILLIEQUIVALENT(S): at 12:26

## 2017-09-16 RX ADMIN — Medication 40 MILLIGRAM(S): at 18:08

## 2017-09-16 RX ADMIN — SPIRONOLACTONE 25 MILLIGRAM(S): 25 TABLET, FILM COATED ORAL at 12:26

## 2017-09-16 NOTE — PROGRESS NOTE ADULT - SUBJECTIVE AND OBJECTIVE BOX
Patient is a 57y old  Male who presents with a chief complaint of I was sick, I had trouble breathing, severely bloated, fatigued, gaining weight (13 Sep 2017 22:00)  Chief complaint: leg edema and shortness of breath.    HPI: 57 year old chronically obese man was well until about 2-4 weeks ago when he noted insidious onset of dyspnea on exertion and bilateral lower extremity edema. Over the past 2 weeks gained 15 pounds and developed bilateral weeping pedal edema and much worse dyspnea on exertion. He denies chest pain. His abdomen became bloated and expanded. He was evaluated tody by Dr. Clement as an outpatient and found to be in rapid atrial fibrillation and referred to the ED.   No prior history of heart failure, GREY, pedal edema. No history of MI, stroke, DM, hyperlipidemia, DVT, pulmonary embolism, arrhythmia. He has been told of borderline hypertension in the past but has never been on meds and not seen a physician for 10 years.       Followup HPI:  - Less breathlessness and abdominal bloating.. No chest pain.  9/15-Much less breathless. Abdominal bloating resolved.  - no complaints.        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 Drugs- none  Meds- none  allergies- NKDA  FH: MOther  83, CAD. Father  80, CAD 2 brothers and 1 sister alive and well-  Review of symptoms:  Negative except for as noted in today's HPI.      MEDICATIONS  (STANDING):  diltiazem Infusion 10 mG/Hr (10 mL/Hr) IV Continuous <Continuous>  docusate sodium 100 milliGRAM(s) Oral three times a day  multivitamin 1 Tablet(s) Oral daily  furosemide   Injectable 40 milliGRAM(s) IV Push two times a day  aminophylline  Injectable 100 milliGRAM(s) IV Push once  metoprolol 25 milliGRAM(s) Oral four times a day  rivaroxaban 20 milliGRAM(s) Oral every 24 hours  potassium chloride    Tablet ER 40 milliEquivalent(s) Oral daily  lisinopril 5 milliGRAM(s) Oral daily    MEDICATIONS  (PRN):  ondansetron Injectable 4 milliGRAM(s) IV Push every 6 hours PRN Nausea          Vital Signs Last 24 Hrs  T(C): 36.6 (16 Sep 2017 05:42), Max: 36.6 (16 Sep 2017 05:42)  T(F): 97.9 (16 Sep 2017 05:42), Max: 97.9 (16 Sep 2017 05:42)  HR: 78 (16 Sep 2017 05:42) (78 - 114)  BP: 123/74 (16 Sep 2017 05:42) (104/77 - 149/95)  BP(mean): --  RR: 18 (16 Sep 2017 05:42) (17 - 18)  SpO2: 98% (16 Sep 2017 05:42) (95% - 98%)    I&O's Summary    15 Sep 2017 07:01  -  16 Sep 2017 07:00  --------------------------------------------------------  IN: 60 mL / OUT: 450 mL / NET: -390 mL    16 Sep 2017 07:01  -  16 Sep 2017 07:18  --------------------------------------------------------  IN: 0 mL / OUT: 700 mL / NET: -700 mL        PHYSICAL EXAM  General Appearance:  HEENT:   Neck:   Lungs: clear  Heart: no murmur . gallop, rub  Abdomen: obese  Extremities: 3+ edema bilateral legs  Neurologic: normal      INTERPRETATION OF TELEMETRY: atrial fib , VR 70s  Persantine MPI scan- no ischemia, LV dilated and hypokinetic with EF 38%.  ECG:    LABS:                          14.1   11.0  )-----------( 229      ( 15 Sep 2017 06:11 )             42.5     -15    139  |  103  |  16  ----------------------------<  105<H>  3.4<L>   |  33<H>  |  0.97    Ca    8.6      15 Sep 2017 06:11            Pro BNP  1789  @ 14:59  D Dimer  --  @ 14:59    PTT - ( 15 Sep 2017 06:11 )  PTT:151.2 sec          RADIOLOGY & ADDITIONAL STUDIES:    IMPRESSION:  1. Dilated cardiomyopathy. CHF much improved.  2.Atrial fibrillation. Rate controlled.  PLAN:  WENDI guided electrical cardioversion on . Agree with lisinopril. Add spironolactone and KCL. Stop IV diltiazem. Continue metoprolol, Xarelto and IV furosemide.

## 2017-09-16 NOTE — PROGRESS NOTE ADULT - SUBJECTIVE AND OBJECTIVE BOX
Patient is 56yo male with PMHx of morbid obesity, presents with SOB and Afib with RVR. Pt reports for the last 2 weeks despite a strict diet, he has noticed 15 lb weight gain, associated with worsening LE edema, and SOB at rest and on exertion. Pt denies fever, chills, chest pain, cough, palpitations, HA, dizziness. No other constitutional symptoms. In the Er patient noted to be in Afib with RVR 150s, give Cardizem IV  and placed drip, Heparin. Pt denies orthopnea, chest on exertion.       09/14/17: Patient seen and examined. He denies any new complaints. SOB improving. Had stress test earlier. Discussed with patient in length regarding management and d/c plan.   09/15/17: Patient seen and examined. He feels better today. Still on cardizem drip. Discussed with patient in length regarding management and d/c plan. Discussed with Dr Holt.  09/16/17: Patient seen and examined. Feels better today. Discussed with patient in length regarding management and d/c plan.     Vital Signs Last 24 Hrs  T(C): 36.5 (16 Sep 2017 10:57), Max: 36.6 (16 Sep 2017 05:42)  T(F): 97.7 (16 Sep 2017 10:57), Max: 97.9 (16 Sep 2017 05:42)  HR: 84 (16 Sep 2017 12:25) (77 - 114)  BP: 119/64 (16 Sep 2017 12:25) (104/77 - 139/75)  BP(mean): --  RR: 18 (16 Sep 2017 10:57) (17 - 18)  SpO2: 98% (16 Sep 2017 10:57) (95% - 98%)          P/E:     Gen: AAOx3, NAD, obese  	HEENT: NCAT, EOMI  	Neck: Supple, no JVD or goitre.  	CV: nml S1S2, RRR  	Lungs: b/l basal rales  	Abd: Soft, NT, ND, BS+, obese  	Ext: 4+ pitting edema b/l  Neuro: Non focal, AAOX3        Labs:                                                    13.6   11.0  )-----------( 235      ( 16 Sep 2017 05:58 )             42.5     16 Sep 2017 05:58    139    |  101    |  18     ----------------------------<  96     4.1     |  33     |  1.02     Ca    8.4        16 Sep 2017 05:58  Mg     2.2       16 Sep 2017 05:58        PTT - ( 15 Sep 2017 06:11 )  PTT:151.2 sec  CAPILLARY BLOOD GLUCOSE                                    MEDICATIONS  (STANDING):  docusate sodium 100 milliGRAM(s) Oral three times a day  multivitamin 1 Tablet(s) Oral daily  furosemide   Injectable 40 milliGRAM(s) IV Push two times a day  aminophylline  Injectable 100 milliGRAM(s) IV Push once  metoprolol 25 milliGRAM(s) Oral four times a day  rivaroxaban 20 milliGRAM(s) Oral every 24 hours  potassium chloride    Tablet ER 40 milliEquivalent(s) Oral daily  lisinopril 5 milliGRAM(s) Oral daily  spironolactone 25 milliGRAM(s) Oral daily    MEDICATIONS  (PRN):  ondansetron Injectable 4 milliGRAM(s) IV Push every 6 hours PRN Nausea                    Assessment and Plan:   Assessment:  · Assessment		  56yo male a/w SOB, Afib with RVR    # SOB/Afib with RVR/Acute decompensated systolic CHF-EF 38% on stress test  Dilated cardiomyopathy  S/P cardizem drip  Continue po lopressor  Dr holt follow up appreciated  -Continue Lasix IV BID  - Heparin drip discontinued and started on xarelto  - low salt diet  -Daily weight  Fluid restrictions  Stress report: no ischemia, consider cardiac cath  Possible WENDI and DCCV on Monday    #Hypokalemia-due to IV lasix  Replaced, ressolved  Follow     # Obesity  Possibly OMAYRA    # Borderline DM-  Counselled    # DVT ppx,   On xarelto

## 2017-09-17 LAB
ANION GAP SERPL CALC-SCNC: 6 MMOL/L — SIGNIFICANT CHANGE UP (ref 5–17)
BUN SERPL-MCNC: 19 MG/DL — SIGNIFICANT CHANGE UP (ref 7–23)
CALCIUM SERPL-MCNC: 8.4 MG/DL — LOW (ref 8.5–10.1)
CHLORIDE SERPL-SCNC: 102 MMOL/L — SIGNIFICANT CHANGE UP (ref 96–108)
CO2 SERPL-SCNC: 32 MMOL/L — HIGH (ref 22–31)
CREAT SERPL-MCNC: 1.05 MG/DL — SIGNIFICANT CHANGE UP (ref 0.5–1.3)
GLUCOSE SERPL-MCNC: 96 MG/DL — SIGNIFICANT CHANGE UP (ref 70–99)
HCT VFR BLD CALC: 40.1 % — SIGNIFICANT CHANGE UP (ref 39–50)
HGB BLD-MCNC: 13.2 G/DL — SIGNIFICANT CHANGE UP (ref 13–17)
MCHC RBC-ENTMCNC: 30.1 PG — SIGNIFICANT CHANGE UP (ref 27–34)
MCHC RBC-ENTMCNC: 33 GM/DL — SIGNIFICANT CHANGE UP (ref 32–36)
MCV RBC AUTO: 91.2 FL — SIGNIFICANT CHANGE UP (ref 80–100)
PLATELET # BLD AUTO: 241 K/UL — SIGNIFICANT CHANGE UP (ref 150–400)
POTASSIUM SERPL-MCNC: 4 MMOL/L — SIGNIFICANT CHANGE UP (ref 3.5–5.3)
POTASSIUM SERPL-SCNC: 4 MMOL/L — SIGNIFICANT CHANGE UP (ref 3.5–5.3)
RBC # BLD: 4.4 M/UL — SIGNIFICANT CHANGE UP (ref 4.2–5.8)
RBC # FLD: 15.2 % — HIGH (ref 10.3–14.5)
SODIUM SERPL-SCNC: 140 MMOL/L — SIGNIFICANT CHANGE UP (ref 135–145)
WBC # BLD: 10.1 K/UL — SIGNIFICANT CHANGE UP (ref 3.8–10.5)
WBC # FLD AUTO: 10.1 K/UL — SIGNIFICANT CHANGE UP (ref 3.8–10.5)

## 2017-09-17 RX ADMIN — Medication 25 MILLIGRAM(S): at 13:03

## 2017-09-17 RX ADMIN — Medication 25 MILLIGRAM(S): at 23:09

## 2017-09-17 RX ADMIN — SPIRONOLACTONE 25 MILLIGRAM(S): 25 TABLET, FILM COATED ORAL at 06:06

## 2017-09-17 RX ADMIN — RIVAROXABAN 20 MILLIGRAM(S): KIT at 18:43

## 2017-09-17 RX ADMIN — Medication 40 MILLIEQUIVALENT(S): at 13:03

## 2017-09-17 RX ADMIN — Medication 40 MILLIGRAM(S): at 06:06

## 2017-09-17 RX ADMIN — Medication 25 MILLIGRAM(S): at 06:06

## 2017-09-17 RX ADMIN — Medication 1 TABLET(S): at 13:03

## 2017-09-17 RX ADMIN — LISINOPRIL 5 MILLIGRAM(S): 2.5 TABLET ORAL at 06:02

## 2017-09-17 RX ADMIN — Medication 40 MILLIGRAM(S): at 18:43

## 2017-09-17 RX ADMIN — Medication 25 MILLIGRAM(S): at 18:43

## 2017-09-17 NOTE — PROGRESS NOTE ADULT - SUBJECTIVE AND OBJECTIVE BOX
Patient is 56yo male with PMHx of morbid obesity, presents with SOB and Afib with RVR. Pt reports for the last 2 weeks despite a strict diet, he has noticed 15 lb weight gain, associated with worsening LE edema, and SOB at rest and on exertion. Pt denies fever, chills, chest pain, cough, palpitations, HA, dizziness. No other constitutional symptoms. In the Er patient noted to be in Afib with RVR 150s, give Cardizem IV  and placed drip, Heparin. Pt denies orthopnea, chest on exertion.       09/14/17: Patient seen and examined. He denies any new complaints. SOB improving. Had stress test earlier. Discussed with patient in length regarding management and d/c plan.   09/15/17: Patient seen and examined. He feels better today. Still on cardizem drip. Discussed with patient in length regarding management and d/c plan. Discussed with Dr Holt.  09/16/17: Patient seen and examined. Feels better today. Discussed with patient in length regarding management and d/c plan.   09/17/17: Patient seen and examined. No new complaints.     Vital Signs Last 24 Hrs  T(C): 36.9 (17 Sep 2017 10:14), Max: 36.9 (17 Sep 2017 10:14)  T(F): 98.4 (17 Sep 2017 10:14), Max: 98.4 (17 Sep 2017 10:14)  HR: 90 (17 Sep 2017 13:03) (79 - 104)  BP: 110/73 (17 Sep 2017 13:03) (93/69 - 145/78)  BP(mean): --  RR: 18 (17 Sep 2017 10:14) (16 - 19)  SpO2: 98% (17 Sep 2017 10:14) (96% - 99%)          P/E:     Gen: AAOx3, NAD, obese  	HEENT: NCAT, EOMI  	Neck: Supple, no JVD or goitre.  	CV: nml S1S2, RRR  	Lungs: b/l basal rales  	Abd: Soft, NT, ND, BS+, obese  	Ext: 4+ pitting edema b/l  Neuro: Non focal, AAOX3        Labs:                                                               13.2   10.1  )-----------( 241      ( 17 Sep 2017 06:09 )             40.1     17 Sep 2017 06:09    140    |  102    |  19     ----------------------------<  96     4.0     |  32     |  1.05     Ca    8.4        17 Sep 2017 06:09  Mg     2.2       16 Sep 2017 05:58          CAPILLARY BLOOD GLUCOSE                           MEDICATIONS  (STANDING):  docusate sodium 100 milliGRAM(s) Oral three times a day  multivitamin 1 Tablet(s) Oral daily  furosemide   Injectable 40 milliGRAM(s) IV Push two times a day  aminophylline  Injectable 100 milliGRAM(s) IV Push once  metoprolol 25 milliGRAM(s) Oral four times a day  rivaroxaban 20 milliGRAM(s) Oral every 24 hours  potassium chloride    Tablet ER 40 milliEquivalent(s) Oral daily  lisinopril 5 milliGRAM(s) Oral daily  spironolactone 25 milliGRAM(s) Oral daily    MEDICATIONS  (PRN):  ondansetron Injectable 4 milliGRAM(s) IV Push every 6 hours PRN Nausea                    Assessment and Plan:   Assessment:  · Assessment		  56yo male a/w SOB, Afib with RVR    # SOB/Afib with RVR/Acute decompensated systolic CHF-EF 38% on stress test  Dilated cardiomyopathy  S/P cardizem drip  Continue po lopressor  Dr holt follow up appreciated  -Continue Lasix IV BID  - Heparin drip discontinued and started on xarelto  - low salt diet  -Daily weight  Fluid restrictions  Stress report: no ischemia, consider cardiac cath  Possible WENDI and DCCV tomorrow.     #Hypokalemia-due to IV lasix  Replaced, ressolved  Follow     # Obesity  Possibly OMAYRA    # Borderline DM-  Counselled    # DVT ppx,   On xarelto

## 2017-09-17 NOTE — PROGRESS NOTE ADULT - SUBJECTIVE AND OBJECTIVE BOX
Patient is a 57y old  Male who presents with a chief complaint of I was sick, I had trouble breathing, severely bloated, fatigued, gaining weight (13 Sep 2017 22:00)      HPI:  Patient is 56yo male with PMHx of morbid obesity, presents with SOB and Afib with RVR. Pt reports for the last 2 weeks despite a strict diet, he has noticed 15 lb weight gain, associated with worsening LE edema, and SOB at rest and on exertion. Pt denies fever, chills, chest pain, cough, palpitations, HA, dizziness. No other constitutional symptoms. In the Er patient noted to be in Afib with RVR 150s, give Cardizem IV  and placed drip, Heparin. Pt denies orthopnea, chest on exertion. (13 Sep 2017 18:12)    follow up  9/17- ambulating in [place] without SOB. [id number]/min- off IV diltiazem  PAST MEDICAL & SURGICAL HISTORY:  Obesity  No significant past surgical history        MEDICATIONS  (STANDING):  docusate sodium 100 milliGRAM(s) Oral three times a day  multivitamin 1 Tablet(s) Oral daily  furosemide   Injectable 40 milliGRAM(s) IV Push two times a day  aminophylline  Injectable 100 milliGRAM(s) IV Push once  metoprolol 25 milliGRAM(s) Oral four times a day  rivaroxaban 20 milliGRAM(s) Oral every 24 hours  potassium chloride    Tablet ER 40 milliEquivalent(s) Oral daily  lisinopril 5 milliGRAM(s) Oral daily  spironolactone 25 milliGRAM(s) Oral daily    MEDICATIONS  (PRN):  ondansetron Injectable 4 milliGRAM(s) IV Push every 6 hours PRN Nausea          Vital Signs Last 24 Hrs  T(C): 36.4 (17 Sep 2017 04:05), Max: 36.7 (16 Sep 2017 17:16)  T(F): 97.5 (17 Sep 2017 04:05), Max: 98.1 (16 Sep 2017 17:16)  HR: 87 (17 Sep 2017 06:01) (77 - 104)  BP: 122/79 (17 Sep 2017 06:01) (93/69 - 145/78)  BP(mean): --  RR: 16 (17 Sep 2017 06:01) (16 - 19)  SpO2: 96% (17 Sep 2017 06:01) (96% - 99%)    I&O's Summary    PHYSICAL EXAM  General Appearance:  HEENT:   Neck:   Lungs: clear  Heart: no murmur . gallop, rub  Abdomen: obese  Extremities: 3+ edema bilateral legs  Neurologic: normal  16 Sep 2017 07:01  -  17 Sep 2017 07:00  --------------------------------------------------------  IN: 401 mL / OUT: 700 mL / NET: -299 mL        INTERPRETATION OF TELEMETRY: a fib 80- 115/ min    ECG:        LABS:                          13.2   10.1  )-----------( 241      ( 17 Sep 2017 06:09 )             40.1     09-17    140  |  102  |  19  ----------------------------<  96  4.0   |  32<H>  |  1.05    Ca    8.4<L>      17 Sep 2017 06:09  Mg     2.2     09-16            Pro BNP  1789 09-13 @ 14:59  D Dimer  -- 09-13 @ 14:59        IMPRESSION:  1. Dilated cardiomyopathy. CHF much improved.  2.Atrial fibrillation. Rate controlled off IV diltiazem  PLAN:  WENDI guided electrical cardioversion on 9/18. Agree with lisinopril. Add spironolactone and KCL. . Continue metoprolol, Xarelto and IV furosemide.

## 2017-09-18 LAB
ANION GAP SERPL CALC-SCNC: 6 MMOL/L — SIGNIFICANT CHANGE UP (ref 5–17)
BUN SERPL-MCNC: 21 MG/DL — SIGNIFICANT CHANGE UP (ref 7–23)
CALCIUM SERPL-MCNC: 8.2 MG/DL — LOW (ref 8.5–10.1)
CHLORIDE SERPL-SCNC: 104 MMOL/L — SIGNIFICANT CHANGE UP (ref 96–108)
CO2 SERPL-SCNC: 29 MMOL/L — SIGNIFICANT CHANGE UP (ref 22–31)
CREAT SERPL-MCNC: 0.96 MG/DL — SIGNIFICANT CHANGE UP (ref 0.5–1.3)
GLUCOSE SERPL-MCNC: 96 MG/DL — SIGNIFICANT CHANGE UP (ref 70–99)
HCT VFR BLD CALC: 43.6 % — SIGNIFICANT CHANGE UP (ref 39–50)
HGB BLD-MCNC: 13.8 G/DL — SIGNIFICANT CHANGE UP (ref 13–17)
MCHC RBC-ENTMCNC: 29.6 PG — SIGNIFICANT CHANGE UP (ref 27–34)
MCHC RBC-ENTMCNC: 31.6 GM/DL — LOW (ref 32–36)
MCV RBC AUTO: 93.7 FL — SIGNIFICANT CHANGE UP (ref 80–100)
PLATELET # BLD AUTO: 210 K/UL — SIGNIFICANT CHANGE UP (ref 150–400)
POTASSIUM SERPL-MCNC: 3.9 MMOL/L — SIGNIFICANT CHANGE UP (ref 3.5–5.3)
POTASSIUM SERPL-SCNC: 3.9 MMOL/L — SIGNIFICANT CHANGE UP (ref 3.5–5.3)
RBC # BLD: 4.66 M/UL — SIGNIFICANT CHANGE UP (ref 4.2–5.8)
RBC # FLD: 15.3 % — HIGH (ref 10.3–14.5)
SODIUM SERPL-SCNC: 139 MMOL/L — SIGNIFICANT CHANGE UP (ref 135–145)
WBC # BLD: 10.4 K/UL — SIGNIFICANT CHANGE UP (ref 3.8–10.5)
WBC # FLD AUTO: 10.4 K/UL — SIGNIFICANT CHANGE UP (ref 3.8–10.5)

## 2017-09-18 PROCEDURE — 93312 ECHO TRANSESOPHAGEAL: CPT | Mod: 26

## 2017-09-18 PROCEDURE — 93010 ELECTROCARDIOGRAM REPORT: CPT

## 2017-09-18 RX ORDER — ACETAMINOPHEN 500 MG
650 TABLET ORAL ONCE
Qty: 0 | Refills: 0 | Status: COMPLETED | OUTPATIENT
Start: 2017-09-18 | End: 2017-09-18

## 2017-09-18 RX ADMIN — Medication 40 MILLIGRAM(S): at 17:05

## 2017-09-18 RX ADMIN — Medication 25 MILLIGRAM(S): at 17:04

## 2017-09-18 RX ADMIN — Medication 650 MILLIGRAM(S): at 21:47

## 2017-09-18 RX ADMIN — Medication 1 TABLET(S): at 11:26

## 2017-09-18 RX ADMIN — LISINOPRIL 5 MILLIGRAM(S): 2.5 TABLET ORAL at 07:18

## 2017-09-18 RX ADMIN — Medication 25 MILLIGRAM(S): at 11:25

## 2017-09-18 RX ADMIN — Medication 650 MILLIGRAM(S): at 21:02

## 2017-09-18 RX ADMIN — Medication 40 MILLIGRAM(S): at 06:32

## 2017-09-18 RX ADMIN — Medication 40 MILLIEQUIVALENT(S): at 11:26

## 2017-09-18 RX ADMIN — RIVAROXABAN 20 MILLIGRAM(S): KIT at 17:04

## 2017-09-18 RX ADMIN — Medication 25 MILLIGRAM(S): at 07:18

## 2017-09-18 NOTE — BEDSIDE PROCEDURE TIME OUT CHECKLIST - NSPOSTCOMMENTSFT_GEN_ALL_CORE
prop passed bubble study and cardioversion successful at 250jouls tolerated well see anesthesia sheet probe passed bubble study and cardioversion successful at 250jouls tolerated well see anesthesia sheet

## 2017-09-18 NOTE — PROGRESS NOTE ADULT - SUBJECTIVE AND OBJECTIVE BOX
Patient is 56yo male with PMHx of morbid obesity, presents with SOB and Afib with RVR. Pt reports for the last 2 weeks despite a strict diet, he has noticed 15 lb weight gain, associated with worsening LE edema, and SOB at rest and on exertion. Pt denies fever, chills, chest pain, cough, palpitations, HA, dizziness. No other constitutional symptoms. In the Er patient noted to be in Afib with RVR 150s, give Cardizem IV  and placed drip, Heparin. Pt denies orthopnea, chest on exertion.       09/14/17: Patient seen and examined. He denies any new complaints. SOB improving. Had stress test earlier. Discussed with patient in length regarding management and d/c plan.   09/15/17: Patient seen and examined. He feels better today. Still on cardizem drip. Discussed with patient in length regarding management and d/c plan. Discussed with Dr Holt.  09/16/17: Patient seen and examined. Feels better today. Discussed with patient in length regarding management and d/c plan.   09/17/17: Patient seen and examined. No new complaints.   09/18/17: Patient seen and examined. S/P WENDI and successful DCCV in am, now on SR. No new complaints. Discussed with patient in length regarding management and d/c plan.     Vital Signs Last 24 Hrs  T(C): 36.5 (18 Sep 2017 10:02), Max: 36.7 (17 Sep 2017 20:53)  T(F): 97.7 (18 Sep 2017 10:02), Max: 98.1 (17 Sep 2017 20:53)  HR: 67 (18 Sep 2017 10:02) (66 - 110)  BP: 117/82 (18 Sep 2017 10:02) (101/- - 139/96)  BP(mean): --  RR: 18 (18 Sep 2017 10:02) (16 - 20)  SpO2: 100% (18 Sep 2017 10:02) (95% - 100%)          P/E:     Gen: AAOx3, NAD, obese  	HEENT: NCAT, EOMI  	Neck: Supple, no JVD or goitre.  	CV: nml S1S2, RRR  	Lungs: b/l basal rales  	Abd: Soft, NT, ND, BS+, obese  	Ext: 4+ pitting edema b/l  Neuro: Non focal, AAOX3        Labs:                                                                                     13.8   10.4  )-----------( 210      ( 18 Sep 2017 06:28 )             43.6     18 Sep 2017 06:28    139    |  104    |  21     ----------------------------<  96     3.9     |  29     |  0.96     Ca    8.2        18 Sep 2017 06:28                                         MEDICATIONS  (STANDING):  docusate sodium 100 milliGRAM(s) Oral three times a day  multivitamin 1 Tablet(s) Oral daily  furosemide   Injectable 40 milliGRAM(s) IV Push two times a day  aminophylline  Injectable 100 milliGRAM(s) IV Push once  metoprolol 25 milliGRAM(s) Oral four times a day  rivaroxaban 20 milliGRAM(s) Oral every 24 hours  potassium chloride    Tablet ER 40 milliEquivalent(s) Oral daily  lisinopril 5 milliGRAM(s) Oral daily  spironolactone 25 milliGRAM(s) Oral daily    MEDICATIONS  (PRN):  ondansetron Injectable 4 milliGRAM(s) IV Push every 6 hours PRN Nausea                      Assessment and Plan:   Assessment:  · Assessment		  56yo male a/w SOB, Afib with RVR    # SOB/Afib with RVR/Acute decompensated systolic CHF-EF 38% on stress test  Dilated cardiomyopathy  S/P WENDI and successful DCCV  S/P cardizem drip  Continue po lopressor, change toprol xl  Dr holt follow up appreciated  -Continue Lasix IV BID for one more day  - Continue xarelto  - low salt diet  -Daily weight  Fluid restrictions  Stress report: no ischemia,        #Hypokalemia-due to IV lasix  Replaced, ressolved    # Obesity  Possibly OMAYRA    # Borderline DM-  Counselled    # DVT ppx,   On xarelto    D/C plan: possibly tomorrow.

## 2017-09-18 NOTE — PROVIDER CONTACT NOTE (OTHER) - ASSESSMENT
Pt reports coming into hospital with loose molar and after WENDI with cardioversion today, two new loose teeth and pain when chewing. Pt rates pain 6/10. Reports it only really bothers him when eating.

## 2017-09-18 NOTE — PROGRESS NOTE ADULT - ASSESSMENT
1. Dilated cardiomyopathy. CHF much improved.  2.Atrial fibrillation. Rate controlled.  PLAN:  WENDI guided electrical cardioversion, today. cont xarelto lisinopril lasix aldactone

## 2017-09-18 NOTE — PROGRESS NOTE ADULT - SUBJECTIVE AND OBJECTIVE BOX
Patient is a 57y old  Male who presents with a chief complaint of I was sick, I had trouble breathing, severely bloated, fatigued, gaining weight (13 Sep 2017 22:00)      HPI:  Patient is 56yo male with PMHx of morbid obesity, presents with SOB and Afib with RVR. Pt reports for the last 2 weeks despite a strict diet, he has noticed 15 lb weight gain, associated with worsening LE edema, and SOB at rest and on exertion. Pt denies fever, chills, chest pain, cough, palpitations, HA, dizziness. No other constitutional symptoms. In the Er patient noted to be in Afib with RVR 150s, give Cardizem IV  and placed drip, Heparin. Pt denies orthopnea, chest on exertion. (13 Sep 2017 18:12)    9/14- Less breathlessness and abdominal bloating.. No chest pain.  9/15-Much less breathless. Abdominal bloating resolved.  9/16- no complaints.  9/18 no new complaints  PAST MEDICAL & SURGICAL HISTORY:  Obesity  No significant past surgical history        MEDICATIONS  (STANDING):  docusate sodium 100 milliGRAM(s) Oral three times a day  multivitamin 1 Tablet(s) Oral daily  furosemide   Injectable 40 milliGRAM(s) IV Push two times a day  aminophylline  Injectable 100 milliGRAM(s) IV Push once  metoprolol 25 milliGRAM(s) Oral four times a day  rivaroxaban 20 milliGRAM(s) Oral every 24 hours  potassium chloride    Tablet ER 40 milliEquivalent(s) Oral daily  lisinopril 5 milliGRAM(s) Oral daily  spironolactone 25 milliGRAM(s) Oral daily    MEDICATIONS  (PRN):  ondansetron Injectable 4 milliGRAM(s) IV Push every 6 hours PRN Nausea          Vital Signs Last 24 Hrs  T(C): 36.7 (17 Sep 2017 20:53), Max: 36.9 (17 Sep 2017 10:14)  T(F): 98.1 (17 Sep 2017 20:53), Max: 98.4 (17 Sep 2017 10:14)  HR: 99 (17 Sep 2017 23:00) (84 - 110)  BP: 112/83 (17 Sep 2017 23:00) (110/73 - 139/96)  BP(mean): --  RR: 18 (17 Sep 2017 23:00) (16 - 18)  SpO2: 95% (17 Sep 2017 23:00) (95% - 98%)    I&O's Summary      PHYSICAL EXAM  General Appearance:  HEENT:   Neck:   Lungs: clear  Heart: no murmur . gallop, rub  Abdomen: obese  Extremities: 3+ edema bilateral legs  Neurologic: normal        INTERPRETATION OF TELEMETRY:    ECG:        LABS:                          13.8   10.4  )-----------( 210      ( 18 Sep 2017 06:28 )             43.6     09-18    139  |  104  |  21  ----------------------------<  96  3.9   |  29  |  0.96    Ca    8.2<L>      18 Sep 2017 06:28            Pro BNP  1789 09-13 @ 14:59  D Dimer  -- 09-13 @ 14:59              RADIOLOGY & ADDITIONAL STUDIES:

## 2017-09-19 LAB
HCT VFR BLD CALC: 43.5 % — SIGNIFICANT CHANGE UP (ref 39–50)
HGB BLD-MCNC: 13.8 G/DL — SIGNIFICANT CHANGE UP (ref 13–17)
MCHC RBC-ENTMCNC: 30 PG — SIGNIFICANT CHANGE UP (ref 27–34)
MCHC RBC-ENTMCNC: 31.8 GM/DL — LOW (ref 32–36)
MCV RBC AUTO: 94.3 FL — SIGNIFICANT CHANGE UP (ref 80–100)
PLATELET # BLD AUTO: 230 K/UL — SIGNIFICANT CHANGE UP (ref 150–400)
RBC # BLD: 4.61 M/UL — SIGNIFICANT CHANGE UP (ref 4.2–5.8)
RBC # FLD: 15.5 % — HIGH (ref 10.3–14.5)
WBC # BLD: 10.7 K/UL — HIGH (ref 3.8–10.5)
WBC # FLD AUTO: 10.7 K/UL — HIGH (ref 3.8–10.5)

## 2017-09-19 PROCEDURE — 99223 1ST HOSP IP/OBS HIGH 75: CPT

## 2017-09-19 PROCEDURE — 93010 ELECTROCARDIOGRAM REPORT: CPT

## 2017-09-19 PROCEDURE — 93042 RHYTHM ECG REPORT: CPT | Mod: 59

## 2017-09-19 RX ORDER — SOTALOL HCL 120 MG
80 TABLET ORAL EVERY 12 HOURS
Qty: 0 | Refills: 0 | Status: DISCONTINUED | OUTPATIENT
Start: 2017-09-19 | End: 2017-09-21

## 2017-09-19 RX ADMIN — Medication 25 MILLIGRAM(S): at 05:43

## 2017-09-19 RX ADMIN — Medication 40 MILLIEQUIVALENT(S): at 12:03

## 2017-09-19 RX ADMIN — Medication 40 MILLIGRAM(S): at 05:43

## 2017-09-19 RX ADMIN — LISINOPRIL 5 MILLIGRAM(S): 2.5 TABLET ORAL at 05:43

## 2017-09-19 RX ADMIN — RIVAROXABAN 20 MILLIGRAM(S): KIT at 17:12

## 2017-09-19 RX ADMIN — Medication 25 MILLIGRAM(S): at 12:04

## 2017-09-19 RX ADMIN — Medication 25 MILLIGRAM(S): at 00:15

## 2017-09-19 RX ADMIN — Medication 40 MILLIGRAM(S): at 17:12

## 2017-09-19 RX ADMIN — Medication 1 TABLET(S): at 12:03

## 2017-09-19 RX ADMIN — SPIRONOLACTONE 25 MILLIGRAM(S): 25 TABLET, FILM COATED ORAL at 05:44

## 2017-09-19 RX ADMIN — Medication 80 MILLIGRAM(S): at 17:12

## 2017-09-19 NOTE — PROGRESS NOTE ADULT - SUBJECTIVE AND OBJECTIVE BOX
Patient is 56yo male with PMHx of morbid obesity, presents with SOB and Afib with RVR. Pt reports for the last 2 weeks despite a strict diet, he has noticed 15 lb weight gain, associated with worsening LE edema, and SOB at rest and on exertion. Pt denies fever, chills, chest pain, cough, palpitations, HA, dizziness. No other constitutional symptoms. In the Er patient noted to be in Afib with RVR 150s, give Cardizem IV  and placed drip, Heparin. Pt denies orthopnea, chest on exertion.       09/14/17: Patient seen and examined. He denies any new complaints. SOB improving. Had stress test earlier. Discussed with patient in length regarding management and d/c plan.   09/15/17: Patient seen and examined. He feels better today. Still on cardizem drip. Discussed with patient in length regarding management and d/c plan. Discussed with Dr Simpson.  09/16/17: Patient seen and examined. Feels better today. Discussed with patient in length regarding management and d/c plan.   09/17/17: Patient seen and examined. No new complaints.   09/18/17: Patient seen and examined. S/P WENDI and successful DCCV in am, now on SR. No new complaints. Discussed with patient in length regarding management and d/c plan.   09/19/17: Patient seen and examined. Upset due to went back to A flutter/fib, edema not improving quickly. He was explained the reasons in the past multiple times and also today in presence of RN. Discussed with EP. Will need ablation.         Vital Signs Last 24 Hrs  T(C): 36.5 (19 Sep 2017 11:08), Max: 36.9 (18 Sep 2017 16:49)  T(F): 97.7 (19 Sep 2017 11:08), Max: 98.4 (18 Sep 2017 16:49)  HR: 75 (19 Sep 2017 12:08) (64 - 99)  BP: 103/66 (19 Sep 2017 12:08) (103/66 - 121/73)  BP(mean): --  RR: 18 (19 Sep 2017 11:08) (18 - 18)  SpO2: 95% (19 Sep 2017 11:08) (95% - 100%)          P/E:     Gen: AAOx3, NAD, obese  	HEENT: NCAT, EOMI  	Neck: Supple, no JVD or goitre.  	CV: nml S1S2, irregular  	Lungs: b/l few basal rales, no rhonchi  	Abd: Soft, NT, ND, BS+, obese  	Ext: 4+ pitting edema b/l  Neuro: Non focal, AAOX3        Labs:                                                                                                        13.8   10.7  )-----------( 230      ( 19 Sep 2017 06:36 )             43.5     18 Sep 2017 06:28    139    |  104    |  21     ----------------------------<  96     3.9     |  29     |  0.96     Ca    8.2        18 Sep 2017 06:28              MEDICATIONS  (STANDING):    docusate sodium 100 milliGRAM(s) Oral three times a day  multivitamin 1 Tablet(s) Oral daily  furosemide   Injectable 40 milliGRAM(s) IV Push two times a day  aminophylline  Injectable 100 milliGRAM(s) IV Push once  metoprolol 25 milliGRAM(s) Oral four times a day  rivaroxaban 20 milliGRAM(s) Oral every 24 hours  potassium chloride    Tablet ER 40 milliEquivalent(s) Oral daily  lisinopril 5 milliGRAM(s) Oral daily  spironolactone 25 milliGRAM(s) Oral daily    MEDICATIONS  (PRN):  ondansetron Injectable 4 milliGRAM(s) IV Push every 6 hours PRN Nausea                      Assessment and Plan:   Assessment:  · Assessment		  56yo male a/w SOB, Afib with RVR    # SOB/Afib with RVR/Acute decompensated systolic CHF-EF 38% on stress test  Dilated cardiomyopathy  S/P WENDI and successful DCCV, went back to A Flutter  S/P cardizem drip  Continue po lopressor,  Dr Jacobo follow up appreciated  -Continue Lasix IV BID   - Continue xarelto  - low salt diet  -Daily weight  Fluid restrictions  Stress report: no ischemia,   EP consult appreciated. Needs ablation       #Hypokalemia-due to IV lasix  Replaced, ressolved    # Obesity  Possibly OMAYRA    # Borderline DM-  Counselled    # DVT ppx,   On xarelto    D/C plan: follow EP recommendation

## 2017-09-19 NOTE — CONSULT NOTE ADULT - ASSESSMENT
58 yo M with PMHx morbid obesity p/w shortness of breath and LE edema, found to have new-onset A Fib with RVR and dilated cardiomyopathy with EF 38%, pt s/p WENDI and cardioversion yesterday, today pt is now in A Fib/Flutter.  EP consulted for possible antiarrhythmic medication vs ablation. 58 yo M with PMHx morbid obesity p/w shortness of breath and LE edema, found to have new-onset A Fib with RVR and dilated cardiomyopathy with EF 38%, pt s/p WENDI and cardioversion yesterday, today pt is now in A Fib/Flutter.  EP consulted for possible antiarrhythmic medication vs ablation.      1. New-Onset A Fib s/p WENDI/cardioversion with newly discovered dilated CM with EF 38%  - currently in A Fib with variable HR   - CHADSVASC = , on Xarelto  - on spironolactone, Lisinopril, Metoprolol, IV Lasix 40 q12 for further diuresis  - plan for ablation, to discuss with Dr. Stevenson 56 yo M with PMHx morbid obesity p/w shortness of breath and LE edema, found to have new-onset A Fib with RVR and dilated cardiomyopathy with EF 38%, pt s/p WENDI and cardioversion yesterday, today pt is now in A Fib/Flutter.  EP consulted for possible antiarrhythmic medication vs ablation.      1. New-Onset A Fib s/p WENDI/cardioversion with newly discovered dilated CM with EF 38%  - currently in A Fib with variable HR ; Atypical A Flutter seen on AM EKG   - CHADSVASC = 1, on Xarelto for AC  - on spironolactone, Lisinopril,  IV Lasix 40 q12 for further diuresis  - discontinue metoprolol  - start Sotalol 80mg BID; obtain EKG 2 hrs after each dose to monitor HR and QT interval  - check BMP in AM as pt is on K-Dur

## 2017-09-19 NOTE — CONSULT NOTE ADULT - SUBJECTIVE AND OBJECTIVE BOX
58 yo M with PMHx morbid obesity admitted on 9/13/17 for shortness of breath, found to have new-onset A Fib with RVR. Pt had admitted to 15lb weight gain with worsening LE edema and SOB for a few weeks. Pt admitted to telemetry, started on IV cardizem and a heparin gtt. Pt switched to Xarelto, IV Cardizem discontinued and switched to oral lopressor, started on IV Lasix and spironolactone. TTE performed showed preserved LV systolic function, but NST 9/15 showed EF 38% with enlarged LV with global hypokinesia, no evidence of ischemia. Pt s/p WENDI and cardioversion yesterday (9/18), now today is back in A Fib/Flutter.  EP consulted for possible addition of antiarrhythmics vs ablation.     ALL: NKDA  Current Rx: Xarelto, Spironolactone, Lisinopril 5 Q24, Lopressor 25 QID, K-Dur, Lasix 40 IV Q12  Surgical Hx  Social Hx  FMHx    VS: /67, P 64, RR 18, 95% O2 on RA, afebrile  Gen  HEENT  CVS  Resp  Abd  Ext    Labs: reviewed  Imaging:    EKG  Telemetry 58 yo M with PMHx morbid obesity admitted on 9/13/17 for shortness of breath, found to have new-onset A Fib with RVR. Pt had admitted to 15lb weight gain with worsening LE edema and SOB for a few weeks. Pt admitted to telemetry, started on IV cardizem and a heparin gtt. Pt switched to Xarelto, IV Cardizem discontinued and switched to oral lopressor, started on IV Lasix and spironolactone. TTE performed showed preserved LV systolic function, but NST 9/15 showed EF 38% with enlarged LV with global hypokinesia, no evidence of ischemia. Pt s/p WENDI and cardioversion yesterday (9/18), now today is back in A Fib/Flutter.  EP consulted for possible addition of antiarrhythmics vs ablation.   Pt has no active complaints today. Denies CP, SOB, palpitations, lightheadedness, dizziness.   Pt states that he used to be very active despite his obesity, in 1/2015 his weight ballooned to almost 400lb, and he exercised, was able to lose about 195lb. His weight then ballooned again this past summer, but despite dieting, he could not lose the weight, states that he developed a lot of LE edema and GREY. He states that he has not seen a physician in over 10yrs.     ALL: NKDA  Current Rx: Xarelto, Spironolactone, Lisinopril 5 Q24, Lopressor 25 QID, K-Dur, Lasix 40 IV Q12  Surgical Hx: tonsillectomy  Social Hx: denies tobacco or drug use; drinks beer 2x/wk  FMHx: CAD in both parents in their 80s    VS: /67, P 64, RR 18, 95% O2 on RA, afebrile  Gen: NAD  HEENT: EOMI, No LVD  CVS Irregularly Irregular, +S1S2, No m/r/g  Resp CTA B/L  Abd obese, soft NT/ND  Ext +2 pitting edema RLE, +1 pitting edema LLE    Labs: reviewed    EKG: TAMIA Herrera @103bpm  Telemetry: currently in A Fib with rates ranging

## 2017-09-19 NOTE — PROGRESS NOTE ADULT - SUBJECTIVE AND OBJECTIVE BOX
Patient is a 57y old  Male who presents with a chief complaint of I was sick, I had trouble breathing, severely bloated, fatigued, gaining weight (13 Sep 2017 22:00)      HPI:  Patient is 56yo male with PMHx of morbid obesity, presents with SOB and Afib with RVR. Pt reports for the last 2 weeks despite a strict diet, he has noticed 15 lb weight gain, associated with worsening LE edema, and SOB at rest and on exertion. Pt denies fever, chills, chest pain, cough, palpitations, HA, dizziness. No other constitutional symptoms. In the Er patient noted to be in Afib with RVR 150s, give Cardizem IV  and placed drip, Heparin. Pt denies orthopnea, chest on exertion. (13 Sep 2017 18:12)  9/14- Less breathlessness and abdominal bloating.. No chest pain.  9/15-Much less breathless. Abdominal bloating resolved.  9/16- no complaints.  9/18 no new complaints  9/19  feels well rhythm is aflutter/fib currently    PAST MEDICAL & SURGICAL HISTORY:  Obesity  No significant past surgical history        MEDICATIONS  (STANDING):  docusate sodium 100 milliGRAM(s) Oral three times a day  multivitamin 1 Tablet(s) Oral daily  furosemide   Injectable 40 milliGRAM(s) IV Push two times a day  aminophylline  Injectable 100 milliGRAM(s) IV Push once  metoprolol 25 milliGRAM(s) Oral four times a day  rivaroxaban 20 milliGRAM(s) Oral every 24 hours  potassium chloride    Tablet ER 40 milliEquivalent(s) Oral daily  lisinopril 5 milliGRAM(s) Oral daily  spironolactone 25 milliGRAM(s) Oral daily    MEDICATIONS  (PRN):  ondansetron Injectable 4 milliGRAM(s) IV Push every 6 hours PRN Nausea          Vital Signs Last 24 Hrs  T(C): 36.2 (19 Sep 2017 04:40), Max: 36.9 (18 Sep 2017 16:49)  T(F): 97.2 (19 Sep 2017 04:40), Max: 98.4 (18 Sep 2017 16:49)  HR: 64 (19 Sep 2017 04:40) (64 - 73)  BP: 114/67 (19 Sep 2017 04:40) (101/- - 121/73)  BP(mean): --  RR: 18 (18 Sep 2017 21:08) (17 - 19)  SpO2: 97% (19 Sep 2017 04:40) (97% - 100%)    I&O's Summary    18 Sep 2017 07:01  -  19 Sep 2017 07:00  --------------------------------------------------------  IN: 0 mL / OUT: 250 mL / NET: -250 mL        PHYSICAL EXAM  General Appearance:nad  HEENT: no jvd  Neck:   Lungs: clear  Heart: no murmur . gallop, rub  Abdomen: obese  Extremities: 3+ edema bilateral legs  Neurologic: normal        INTERPRETATION OF TELEMETRY:    ECG:        LABS:                          13.8   10.7  )-----------( 230      ( 19 Sep 2017 06:36 )             43.5     09-18    139  |  104  |  21  ----------------------------<  96  3.9   |  29  |  0.96    Ca    8.2<L>      18 Sep 2017 06:28            Pro BNP  1789 09-13 @ 14:59  D Dimer  -- 09-13 @ 14:59              RADIOLOGY & ADDITIONAL STUDIES:

## 2017-09-20 LAB
ANION GAP SERPL CALC-SCNC: 8 MMOL/L — SIGNIFICANT CHANGE UP (ref 5–17)
BUN SERPL-MCNC: 21 MG/DL — SIGNIFICANT CHANGE UP (ref 7–23)
CALCIUM SERPL-MCNC: 8.4 MG/DL — LOW (ref 8.5–10.1)
CHLORIDE SERPL-SCNC: 104 MMOL/L — SIGNIFICANT CHANGE UP (ref 96–108)
CO2 SERPL-SCNC: 29 MMOL/L — SIGNIFICANT CHANGE UP (ref 22–31)
CREAT SERPL-MCNC: 0.96 MG/DL — SIGNIFICANT CHANGE UP (ref 0.5–1.3)
GLUCOSE SERPL-MCNC: 93 MG/DL — SIGNIFICANT CHANGE UP (ref 70–99)
HCT VFR BLD CALC: 43.4 % — SIGNIFICANT CHANGE UP (ref 39–50)
HGB BLD-MCNC: 13.9 G/DL — SIGNIFICANT CHANGE UP (ref 13–17)
MAGNESIUM SERPL-MCNC: 2.3 MG/DL — SIGNIFICANT CHANGE UP (ref 1.6–2.6)
MCHC RBC-ENTMCNC: 30.1 PG — SIGNIFICANT CHANGE UP (ref 27–34)
MCHC RBC-ENTMCNC: 32.1 GM/DL — SIGNIFICANT CHANGE UP (ref 32–36)
MCV RBC AUTO: 93.7 FL — SIGNIFICANT CHANGE UP (ref 80–100)
PHOSPHATE SERPL-MCNC: 4 MG/DL — SIGNIFICANT CHANGE UP (ref 2.5–4.5)
PLATELET # BLD AUTO: 198 K/UL — SIGNIFICANT CHANGE UP (ref 150–400)
POTASSIUM SERPL-MCNC: 3.9 MMOL/L — SIGNIFICANT CHANGE UP (ref 3.5–5.3)
POTASSIUM SERPL-SCNC: 3.9 MMOL/L — SIGNIFICANT CHANGE UP (ref 3.5–5.3)
RBC # BLD: 4.63 M/UL — SIGNIFICANT CHANGE UP (ref 4.2–5.8)
RBC # FLD: 15.5 % — HIGH (ref 10.3–14.5)
SODIUM SERPL-SCNC: 141 MMOL/L — SIGNIFICANT CHANGE UP (ref 135–145)
WBC # BLD: 9.6 K/UL — SIGNIFICANT CHANGE UP (ref 3.8–10.5)
WBC # FLD AUTO: 9.6 K/UL — SIGNIFICANT CHANGE UP (ref 3.8–10.5)

## 2017-09-20 PROCEDURE — 93010 ELECTROCARDIOGRAM REPORT: CPT | Mod: 76

## 2017-09-20 RX ADMIN — Medication 80 MILLIGRAM(S): at 06:37

## 2017-09-20 RX ADMIN — Medication 40 MILLIGRAM(S): at 17:52

## 2017-09-20 RX ADMIN — Medication 1 TABLET(S): at 11:42

## 2017-09-20 RX ADMIN — Medication 40 MILLIGRAM(S): at 06:37

## 2017-09-20 RX ADMIN — SPIRONOLACTONE 25 MILLIGRAM(S): 25 TABLET, FILM COATED ORAL at 06:37

## 2017-09-20 RX ADMIN — Medication 80 MILLIGRAM(S): at 17:54

## 2017-09-20 RX ADMIN — RIVAROXABAN 20 MILLIGRAM(S): KIT at 17:52

## 2017-09-20 RX ADMIN — Medication 40 MILLIEQUIVALENT(S): at 11:42

## 2017-09-20 RX ADMIN — LISINOPRIL 5 MILLIGRAM(S): 2.5 TABLET ORAL at 06:37

## 2017-09-20 NOTE — PROGRESS NOTE ADULT - SUBJECTIVE AND OBJECTIVE BOX
Patient is 56yo male with PMHx of morbid obesity, presents with SOB and Afib with RVR. Pt reports for the last 2 weeks despite a strict diet, he has noticed 15 lb weight gain, associated with worsening LE edema, and SOB at rest and on exertion. Pt denies fever, chills, chest pain, cough, palpitations, HA, dizziness. No other constitutional symptoms. In the Er patient noted to be in Afib with RVR 150s, give Cardizem IV  and placed drip, Heparin. Pt denies orthopnea, chest on exertion.     Hospital course: Stress test: no acute ischemia with EF 37%, continue with IV lasix. Leg edema improving.  Patient went back to A fib/flutter after few hours of DCCV. Seen by Dr Stevenson and started on sotalol. Possible repeat DCCV on Friday.     09/14/17: Patient seen and examined. He denies any new complaints. SOB improving. Had stress test earlier. Discussed with patient in length regarding management and d/c plan.   09/15/17: Patient seen and examined. He feels better today. Still on cardizem drip. Discussed with patient in length regarding management and d/c plan. Discussed with Dr Simpson.  09/16/17: Patient seen and examined. Feels better today. Discussed with patient in length regarding management and d/c plan.   09/17/17: Patient seen and examined. No new complaints.   09/18/17: Patient seen and examined. S/P WENDI and successful DCCV in am, now on SR. No new complaints. Discussed with patient in length regarding management and d/c plan.   09/19/17: Patient seen and examined. Upset due to went back to A flutter/fib, edema not improving quickly. He was explained the reasons in the past multiple times and also today in presence of RN. Discussed with EP. Will need ablation.   09/20/17: Patient seen and examined. Complaining of tooth pain started after WENDI. No signs of inflammation or swelling. Discussed with Dr Stevenson, possible repeat DCCV on Friday. Discussed with patient in length regarding management and d/c plan.         Vital Signs Last 24 Hrs  T(C): 36.9 (20 Sep 2017 10:22), Max: 37.2 (20 Sep 2017 06:00)  T(F): 98.4 (20 Sep 2017 10:22), Max: 99 (20 Sep 2017 06:00)  HR: 77 (20 Sep 2017 10:22) (75 - 130)  BP: 101/52 (20 Sep 2017 10:22) (101/52 - 129/88)  BP(mean): --  RR: 18 (20 Sep 2017 10:22) (18 - 19)  SpO2: 96% (20 Sep 2017 10:22) (95% - 99%)          P/E:     Gen: AAOx3, NAD, obese  	HEENT: NCAT, EOMI  	Neck: Supple, no JVD or goitre.  	CV: nml S1S2, irregular  	Lungs: b/l few basal rales, no rhonchi  	Abd: Soft, NT, ND, BS+, obese  	Ext: 4+ pitting edema b/l  Neuro: Non focal, AAOX3        Labs:                                                                                                                   13.9   9.6   )-----------( 198      ( 20 Sep 2017 06:25 )             43.4     20 Sep 2017 06:25    141    |  104    |  21     ----------------------------<  93     3.9     |  29     |  0.96     Ca    8.4        20 Sep 2017 06:25  Phos  4.0       20 Sep 2017 06:25  Mg     2.3       20 Sep 2017 06:25                MEDICATIONS  (STANDING):    MEDICATIONS  (STANDING):  docusate sodium 100 milliGRAM(s) Oral three times a day  multivitamin 1 Tablet(s) Oral daily  furosemide   Injectable 40 milliGRAM(s) IV Push two times a day  aminophylline  Injectable 100 milliGRAM(s) IV Push once  rivaroxaban 20 milliGRAM(s) Oral every 24 hours  potassium chloride    Tablet ER 40 milliEquivalent(s) Oral daily  lisinopril 5 milliGRAM(s) Oral daily  spironolactone 25 milliGRAM(s) Oral daily  sotalol 80 milliGRAM(s) Oral every 12 hours    MEDICATIONS  (PRN):  ondansetron Injectable 4 milliGRAM(s) IV Push every 6 hours PRN Nausea                        Assessment and Plan:   Assessment:  · Assessment		  56yo male a/w SOB, Afib with RVR    # SOB/Afib with RVR/Acute decompensated systolic CHF-EF 38% on stress test  Dilated cardiomyopathy  S/P WENDI and successful DCCV, went back to A Fib/flutter after few hours.  S/P cardizem drip  Dr Stevenson consult appreciated, started on sotalol  Follow QTc  Dr Jacobo follow up appreciated  -Continue Lasix IV BID   - Continue xarelto  - low salt diet  -Daily weight  Fluid restrictions  Stress report: no ischemia,        #Hypokalemia-due to IV lasix  Replaced, resolved    # Obesity  Possibly OMAYRA    # Borderline DM-  Counselled    # DVT ppx,   On xarelto    D/C plan: follow EP recommendation

## 2017-09-20 NOTE — PROGRESS NOTE ADULT - SUBJECTIVE AND OBJECTIVE BOX
Patient is a 57y old  Male who presents with a chief complaint of I was sick, I had trouble breathing, severely bloated, fatigued, gaining weight (13 Sep 2017 22:00)      HPI:  Patient is 58yo male with PMHx of morbid obesity, presents with SOB and Afib with RVR. Pt reports for the last 2 weeks despite a strict diet, he has noticed 15 lb weight gain, associated with worsening LE edema, and SOB at rest and on exertion. Pt denies fever, chills, chest pain, cough, palpitations, HA, dizziness. No other constitutional symptoms. In the Er patient noted to be in Afib with RVR 150s, give Cardizem IV  and placed drip, Heparin. Pt denies orthopnea, chest on exertion. (13 Sep 2017 18:12)    9/14- Less breathlessness and abdominal bloating.. No chest pain.  9/15-Much less breathless. Abdominal bloating resolved.  9/16- no complaints.  9/18 no new complaints  9/19  feels well rhythm is aflutter/fib currently  9/20 no complaints currently in afib  PAST MEDICAL & SURGICAL HISTORY:  Obesity  No significant past surgical history        MEDICATIONS  (STANDING):  docusate sodium 100 milliGRAM(s) Oral three times a day  multivitamin 1 Tablet(s) Oral daily  furosemide   Injectable 40 milliGRAM(s) IV Push two times a day  aminophylline  Injectable 100 milliGRAM(s) IV Push once  rivaroxaban 20 milliGRAM(s) Oral every 24 hours  potassium chloride    Tablet ER 40 milliEquivalent(s) Oral daily  lisinopril 5 milliGRAM(s) Oral daily  spironolactone 25 milliGRAM(s) Oral daily  sotalol 80 milliGRAM(s) Oral every 12 hours    MEDICATIONS  (PRN):  ondansetron Injectable 4 milliGRAM(s) IV Push every 6 hours PRN Nausea          Vital Signs Last 24 Hrs  T(C): 37.2 (20 Sep 2017 06:00), Max: 37.2 (20 Sep 2017 06:00)  T(F): 99 (20 Sep 2017 06:00), Max: 99 (20 Sep 2017 06:00)  HR: 86 (20 Sep 2017 06:00) (75 - 130)  BP: 114/70 (20 Sep 2017 06:00) (103/66 - 129/88)  BP(mean): --  RR: 18 (20 Sep 2017 06:00) (18 - 19)  SpO2: 99% (20 Sep 2017 06:00) (95% - 99%)    I&O's Summary      PHYSICAL EXAM  General Appearance:nad  HEENT: no jvd  Neck:   Lungs: clear  Heart: no murmur . gallop, rub  Abdomen: obese  Extremities: 3+ edema bilateral legs  Neurologic: normal        INTERPRETATION OF TELEMETRY:    ECG:        LABS:                          13.8   10.7  )-----------( 230      ( 19 Sep 2017 06:36 )             43.5     09-20    141  |  104  |  21  ----------------------------<  93  3.9   |  29  |  0.96    Ca    8.4<L>      20 Sep 2017 06:25            Pro BNP  1789 09-13 @ 14:59  D Dimer  -- 09-13 @ 14:59              RADIOLOGY & ADDITIONAL STUDIES:

## 2017-09-20 NOTE — PROGRESS NOTE ADULT - ASSESSMENT
1. Mild Dilated cardiomyopathy. CHF much improved.  2.Atrial fibrillation. s/p cardioversion. back in afl/afib. now on sotalol  PLAN:  . cont xarelto lisinopril lasix aldactone, sotalol follow qt

## 2017-09-21 PROBLEM — Z00.00 ENCOUNTER FOR PREVENTIVE HEALTH EXAMINATION: Status: ACTIVE | Noted: 2017-09-21

## 2017-09-21 LAB
ANION GAP SERPL CALC-SCNC: 9 MMOL/L — SIGNIFICANT CHANGE UP (ref 5–17)
BUN SERPL-MCNC: 23 MG/DL — SIGNIFICANT CHANGE UP (ref 7–23)
CALCIUM SERPL-MCNC: 8.7 MG/DL — SIGNIFICANT CHANGE UP (ref 8.5–10.1)
CHLORIDE SERPL-SCNC: 101 MMOL/L — SIGNIFICANT CHANGE UP (ref 96–108)
CO2 SERPL-SCNC: 27 MMOL/L — SIGNIFICANT CHANGE UP (ref 22–31)
CREAT SERPL-MCNC: 1.15 MG/DL — SIGNIFICANT CHANGE UP (ref 0.5–1.3)
GLUCOSE SERPL-MCNC: 73 MG/DL — SIGNIFICANT CHANGE UP (ref 70–99)
HCT VFR BLD CALC: 49.1 % — SIGNIFICANT CHANGE UP (ref 39–50)
HGB BLD-MCNC: 15.8 G/DL — SIGNIFICANT CHANGE UP (ref 13–17)
MCHC RBC-ENTMCNC: 29.9 PG — SIGNIFICANT CHANGE UP (ref 27–34)
MCHC RBC-ENTMCNC: 32.2 GM/DL — SIGNIFICANT CHANGE UP (ref 32–36)
MCV RBC AUTO: 92.6 FL — SIGNIFICANT CHANGE UP (ref 80–100)
PLATELET # BLD AUTO: 237 K/UL — SIGNIFICANT CHANGE UP (ref 150–400)
POTASSIUM SERPL-MCNC: 4.1 MMOL/L — SIGNIFICANT CHANGE UP (ref 3.5–5.3)
POTASSIUM SERPL-SCNC: 4.1 MMOL/L — SIGNIFICANT CHANGE UP (ref 3.5–5.3)
RBC # BLD: 5.3 M/UL — SIGNIFICANT CHANGE UP (ref 4.2–5.8)
RBC # FLD: 15.3 % — HIGH (ref 10.3–14.5)
SODIUM SERPL-SCNC: 137 MMOL/L — SIGNIFICANT CHANGE UP (ref 135–145)
WBC # BLD: 14.3 K/UL — HIGH (ref 3.8–10.5)
WBC # FLD AUTO: 14.3 K/UL — HIGH (ref 3.8–10.5)

## 2017-09-21 PROCEDURE — 99233 SBSQ HOSP IP/OBS HIGH 50: CPT

## 2017-09-21 PROCEDURE — 93010 ELECTROCARDIOGRAM REPORT: CPT | Mod: 76

## 2017-09-21 RX ORDER — SOTALOL HCL 120 MG
120 TABLET ORAL EVERY 12 HOURS
Qty: 0 | Refills: 0 | Status: DISCONTINUED | OUTPATIENT
Start: 2017-09-21 | End: 2017-09-22

## 2017-09-21 RX ADMIN — SPIRONOLACTONE 25 MILLIGRAM(S): 25 TABLET, FILM COATED ORAL at 05:38

## 2017-09-21 RX ADMIN — Medication 120 MILLIGRAM(S): at 18:24

## 2017-09-21 RX ADMIN — RIVAROXABAN 20 MILLIGRAM(S): KIT at 17:43

## 2017-09-21 RX ADMIN — Medication 100 MILLIGRAM(S): at 05:38

## 2017-09-21 RX ADMIN — Medication 40 MILLIGRAM(S): at 17:43

## 2017-09-21 RX ADMIN — Medication 1 TABLET(S): at 12:43

## 2017-09-21 RX ADMIN — Medication 40 MILLIEQUIVALENT(S): at 12:43

## 2017-09-21 RX ADMIN — Medication 80 MILLIGRAM(S): at 05:38

## 2017-09-21 RX ADMIN — Medication 40 MILLIGRAM(S): at 05:37

## 2017-09-21 RX ADMIN — LISINOPRIL 5 MILLIGRAM(S): 2.5 TABLET ORAL at 05:38

## 2017-09-21 NOTE — PROGRESS NOTE ADULT - ASSESSMENT
56yo male a/w SOB, Aflutter with RVR    # SOB/Aflutter with RVR/Acute decompensated systolic CHF-EF 38% on stress test  Dilated cardiomyopathy  S/P WENDI and successful DCCV, went back to A Fib/flutter after few hours.  S/P cardizem drip  Dr Stevenson consult appreciated, started on sotalol  Dr Jacobo follow up appreciated  -Continue Lasix IV BID   - Continue xarelto  - low salt diet  -Daily weight  Fluid restrictions  Stress report: no ischemia,   Sotalol increased to 120 mg bid as per EP on 9/21  DCCV on 9/22       #Hypokalemia- resolved. K 3.9; recheck in am    # Obesity  Possibly OMAYRA    # Borderline DM-  Counselled    # DVT ppx,   On xarelto    poc discussed with pt, team

## 2017-09-21 NOTE — PROGRESS NOTE ADULT - SUBJECTIVE AND OBJECTIVE BOX
Patient is a 57y old  Male who presents with a chief complaint of I was sick, I had trouble breathing, severely bloated, fatigued, gaining weight (13 Sep 2017 22:00)      HPI:  Patient is 58yo male with PMHx of morbid obesity, presents with SOB and Afib with RVR. Pt reports for the last 2 weeks despite a strict diet, he has noticed 15 lb weight gain, associated with worsening LE edema, and SOB at rest and on exertion. Pt denies fever, chills, chest pain, cough, palpitations, HA, dizziness. No other constitutional symptoms. In the Er patient noted to be in Afib with RVR 150s, give Cardizem IV  and placed drip, Heparin. Pt denies orthopnea, chest on exertion. (13 Sep 2017 18:12)    9/14- Less breathlessness and abdominal bloating.. No chest pain.  9/15-Much less breathless. Abdominal bloating resolved.  9/16- no complaints.  9/18 no new complaints  9/19  feels well rhythm is aflutter/fib currently  9/20 no complaints currently in afib  9/21 no complaints    PAST MEDICAL & SURGICAL HISTORY:  Obesity  No significant past surgical history        MEDICATIONS  (STANDING):  docusate sodium 100 milliGRAM(s) Oral three times a day  multivitamin 1 Tablet(s) Oral daily  furosemide   Injectable 40 milliGRAM(s) IV Push two times a day  aminophylline  Injectable 100 milliGRAM(s) IV Push once  rivaroxaban 20 milliGRAM(s) Oral every 24 hours  potassium chloride    Tablet ER 40 milliEquivalent(s) Oral daily  lisinopril 5 milliGRAM(s) Oral daily  spironolactone 25 milliGRAM(s) Oral daily  sotalol 80 milliGRAM(s) Oral every 12 hours    MEDICATIONS  (PRN):  ondansetron Injectable 4 milliGRAM(s) IV Push every 6 hours PRN Nausea          Vital Signs Last 24 Hrs  T(C): 36.4 (20 Sep 2017 20:56), Max: 36.9 (20 Sep 2017 10:22)  T(F): 97.5 (20 Sep 2017 20:56), Max: 98.4 (20 Sep 2017 10:22)  HR: 68 (20 Sep 2017 20:56) (60 - 77)  BP: 118/74 (20 Sep 2017 20:56) (101/52 - 129/58)  BP(mean): --  RR: 17 (20 Sep 2017 20:56) (17 - 18)  SpO2: 97% (20 Sep 2017 20:56) (96% - 99%)    I&O's Summary      PHYSICAL EXAM  General Appearance:nad  HEENT: no jvd  Neck:   Lungs: clear  Heart: no murmur . gallop, rub  Abdomen: obese  Extremities: 2+ edema bilateral legs  Neurologic: normal        INTERPRETATION OF TELEMETRY:    ECG:        LABS:                          13.9   9.6   )-----------( 198      ( 20 Sep 2017 06:25 )             43.4     09-20    141  |  104  |  21  ----------------------------<  93  3.9   |  29  |  0.96    Ca    8.4<L>      20 Sep 2017 06:25  Phos  4.0     09-20  Mg     2.3     09-20                        RADIOLOGY & ADDITIONAL STUDIES:

## 2017-09-21 NOTE — PROGRESS NOTE ADULT - SUBJECTIVE AND OBJECTIVE BOX
56 yo  M with PMHx morbid obesity admitted on 9/13/17 for shortness of breath, weight gain and LE edema; found to have new-onset A Fib with RVR, and newly diagnosed dilated cardiomyopathy. Pt s/p cardioversion on 9/18, pt reverted back to A Fib and atypical A Flutter, EP consulted.   Pt started on Sotalol 80 BID with first dose given evening 9/19.    Current Rx: Xarelto 20 Q24, Sotalol 80 BID, Spironolactone 25 Q24, Lisinopril 5 Q24, K-Dur 40 meq, Lasix 40 IV Q12     VS: -129/54-74; P 60-84; RR 17; Afebrile. sat 92-99% on RA  Gen: NAD  HEENT: EOMI, No LVD  CVS Irregularly Irregular, +S1S2, No m/r/g  Resp CTA B/L  Abd obese, soft NT/ND  Ext +2 pitting edema RLE, +1 pitting edema LLE    Labs: reviewed; new leukocytosis 14.3 today   Imaging    EKG 9/21: ATypical A flutter @98bpm, , QTc 460  Telemetry: A Flutter with rates varying from     A/P: 56 yo M with PMHx morbid obesity p/w shortness of breath and LE edema, found to have new-onset A Fib with RVR and dilated cardiomyopathy with EF 38%, pt s/p WENDI and cardioversion 9/18, reverted back to A Fib and atypical A Flutter, started on Sotalol 80 BID evening of 9/19.    1. New-Onset A Fib s/p WENDI/cardioversion with newly discovered mild dilated cardiomyopathy   - currently in A Fib with variable HR ; Atypical A Flutter seen on AM EKG   - CHADSVASC = 1, on Xarelto for AC  - on Spironolactone, Lisinopril,  IV Lasix 40 q12 for further diuresis  - Increase Sotalol to 120mg BID; obtain EKG 2 hrs after each dose to monitor HR and QT interval  - continue to monitor K+  - NPO after midnight for planned cardioversion tomorrow 56 yo  M with PMHx morbid obesity admitted on 9/13/17 for shortness of breath, weight gain and LE edema; found to have new-onset A Fib with RVR, and newly diagnosed dilated cardiomyopathy. Pt s/p cardioversion on 9/18, pt reverted back to A Fib and atypical A Flutter, EP consulted.   Pt started on Sotalol 80 BID with first dose given evening 9/19.  Pt denies any active complaints, denies CP, SOB, GREY, palpitations, lightheadedness, dizziness or syncope.     Current Rx: Xarelto 20 Q24, Sotalol 80 BID, Spironolactone 25 Q24, Lisinopril 5 Q24, K-Dur 40 meq, Lasix 40 IV Q12     VS: -129/54-74; P 60-84; RR 17; Afebrile. sat 92-99% on RA  Gen: NAD  HEENT: EOMI, No JVD  CVS Tachycardic Irregularly Irregular, +S1S2, No m/r/g  Resp CTA B/L  Abd obese, soft NT/ND  Ext +1 pitting edema RLE, minimal pitting edema LLE    Labs: reviewed; new leukocytosis 14.3 today     EKG 9/21: ATypical A flutter @98bpm, , QTc 460  Telemetry: A Flutter with rates varying from 90 to 130s; had one episode 4 beats NSVT this afternoon. Currently HR 130s.     A/P: 56 yo M with PMHx morbid obesity p/w shortness of breath and LE edema, found to have new-onset A Fib with RVR and dilated cardiomyopathy with EF 38%, pt s/p WENDI and cardioversion 9/18, reverted back to A Fib and atypical A Flutter, started on Sotalol 80 BID evening of 9/19.    1. New-Onset A Fib s/p WENDI/cardioversion with newly discovered mild dilated cardiomyopathy   - currently in A Fib with variable HR ; Atypical A Flutter seen on AM EKG   - CHADSVASC = 2, on Xarelto for AC  - on Spironolactone, Lisinopril,  IV Lasix 40 q12 for further diuresis  - Increase Sotalol to 120mg BID; obtain EKG 2 hrs after each dose to monitor HR and QT interval  - continue to monitor K+  - NPO after midnight for planned cardioversion tomorrow

## 2017-09-21 NOTE — PROGRESS NOTE ADULT - SUBJECTIVE AND OBJECTIVE BOX
Patient is 58yo male with PMHx of morbid obesity, presents with SOB and Afib with RVR. Pt reports for the last 2 weeks despite a strict diet, he has noticed 15 lb weight gain, associated with worsening LE edema, and SOB at rest and on exertion. Pt denies fever, chills, chest pain, cough, palpitations, HA, dizziness. No other constitutional symptoms. In the Er patient noted to be in Afib with RVR 150s, give Cardizem IV  and placed drip, Heparin. Pt denies orthopnea, chest on exertion.     Hospital course: Stress test: no acute ischemia with EF 37%, continue with IV lasix. Leg edema improving.  Patient went back to A fib/flutter after few hours of DCCV. Seen by Dr Stevenson and started on sotalol. Possible repeat DCCV on Friday.     17: Patient seen and examined. He denies any new complaints. SOB improving. Had stress test earlier. Discussed with patient in length regarding management and d/c plan.   09/15/17: Patient seen and examined. He feels better today. Still on cardizem drip. Discussed with patient in length regarding management and d/c plan. Discussed with Dr Simpson.  17: Patient seen and examined. Feels better today. Discussed with patient in length regarding management and d/c plan.   17: Patient seen and examined. No new complaints.   17: Patient seen and examined. S/P WENID and successful DCCV in am, now on SR. No new complaints. Discussed with patient in length regarding management and d/c plan.   17: Patient seen and examined. Upset due to went back to A flutter/fib, edema not improving quickly. He was explained the reasons in the past multiple times and also today in presence of RN. Discussed with EP. Will need ablation.   17: Patient seen and examined. Complaining of tooth pain started after WENDI. No signs of inflammation or swelling. Discussed with Dr Stevenson, possible repeat DCCV on Friday. Discussed with patient in length regarding management and d/c plan.     : no complaints, QTc 495 ms.      PHYSICAL EXAM:    Daily     Daily Weight in k.1 (21 Sep 2017 11:26)    ICU Vital Signs Last 24 Hrs  T(C): 37.2 (21 Sep 2017 11:26), Max: 37.2 (21 Sep 2017 11:26)  T(F): 98.9 (21 Sep 2017 11:), Max: 98.9 (21 Sep 2017 11:)  HR: 84 (21 Sep 2017 11:) (60 - 84)  BP: 101/54 (21 Sep 2017 11:) (101/54 - 129/58)  BP(mean): --  ABP: --  ABP(mean): --  RR: 17 (21 Sep 2017 11:26) (17 - 18)  SpO2: 92% (21 Sep 2017 11:) (92% - 99%)      Constitutional: Well appearing  HEENT: Atraumatic, JASMIN, Normal, No congestion  Respiratory: Breath Sounds normal, no rhonchi/wheeze  Cardiovascular: N S1S2; SAM present  Gastrointestinal: Abdomen soft, non tender, Bowel Sounds present  Extremities: No edema, peripheral pulses present  Neurological: AAO x 3, no gross focal motor deficits  Skin: Non cellulitic, no rash, ulcers  Lymph Nodes: No lymphadenopathy noted  Back: No CVA tenderness   Musculoskeletal: non tender  Breasts: Deferred  Genitourinary: deferred  Rectal: Deferred                          15.8   14.3  )-----------( 237      ( 21 Sep 2017 06:21 )             49.1       CBC Full  -  ( 21 Sep 2017 06:21 )  WBC Count : 14.3 K/uL  Hemoglobin : 15.8 g/dL  Hematocrit : 49.1 %  Platelet Count - Automated : 237 K/uL  Mean Cell Volume : 92.6 fl  Mean Cell Hemoglobin : 29.9 pg  Mean Cell Hemoglobin Concentration : 32.2 gm/dL  Auto Neutrophil # : x  Auto Lymphocyte # : x  Auto Monocyte # : x  Auto Eosinophil # : x  Auto Basophil # : x  Auto Neutrophil % : x  Auto Lymphocyte % : x  Auto Monocyte % : x  Auto Eosinophil % : x  Auto Basophil % : x      09-20    141  |  104  |  21  ----------------------------<  93  3.9   |  29  |  0.96    Ca    8.4<L>      20 Sep 2017 06:25  Phos  4.0     09-20  Mg     2.3     09-20                              MEDICATIONS  (STANDING):  docusate sodium 100 milliGRAM(s) Oral three times a day  multivitamin 1 Tablet(s) Oral daily  furosemide   Injectable 40 milliGRAM(s) IV Push two times a day  aminophylline  Injectable 100 milliGRAM(s) IV Push once  rivaroxaban 20 milliGRAM(s) Oral every 24 hours  potassium chloride    Tablet ER 40 milliEquivalent(s) Oral daily  lisinopril 5 milliGRAM(s) Oral daily  spironolactone 25 milliGRAM(s) Oral daily  sotalol 80 milliGRAM(s) Oral every 12 hours

## 2017-09-21 NOTE — PROGRESS NOTE ADULT - ASSESSMENT
1. Mild Dilated cardiomyopathy. CHF much improved.  2.Atrial fibrillation. s/p cardioversion. back in afl/afib. now on sotalol  PLAN:  . cont xarelto lisinopril lasix aldactone, sotalol follow qt  For cardioversion tomorrow then can be discharged to follow up with dr holt

## 2017-09-22 ENCOUNTER — TRANSCRIPTION ENCOUNTER (OUTPATIENT)
Age: 57
End: 2017-09-22

## 2017-09-22 VITALS
RESPIRATION RATE: 18 BRPM | HEART RATE: 65 BPM | OXYGEN SATURATION: 100 % | SYSTOLIC BLOOD PRESSURE: 102 MMHG | DIASTOLIC BLOOD PRESSURE: 81 MMHG | TEMPERATURE: 98 F

## 2017-09-22 PROBLEM — E66.9 OBESITY, UNSPECIFIED: Chronic | Status: ACTIVE | Noted: 2017-09-13

## 2017-09-22 LAB
HCT VFR BLD CALC: 44.4 % — SIGNIFICANT CHANGE UP (ref 39–50)
HGB BLD-MCNC: 14.7 G/DL — SIGNIFICANT CHANGE UP (ref 13–17)
MCHC RBC-ENTMCNC: 30.5 PG — SIGNIFICANT CHANGE UP (ref 27–34)
MCHC RBC-ENTMCNC: 33 GM/DL — SIGNIFICANT CHANGE UP (ref 32–36)
MCV RBC AUTO: 92.4 FL — SIGNIFICANT CHANGE UP (ref 80–100)
PLATELET # BLD AUTO: 203 K/UL — SIGNIFICANT CHANGE UP (ref 150–400)
RBC # BLD: 4.8 M/UL — SIGNIFICANT CHANGE UP (ref 4.2–5.8)
RBC # FLD: 14.8 % — HIGH (ref 10.3–14.5)
WBC # BLD: 10.3 K/UL — SIGNIFICANT CHANGE UP (ref 3.8–10.5)
WBC # FLD AUTO: 10.3 K/UL — SIGNIFICANT CHANGE UP (ref 3.8–10.5)

## 2017-09-22 PROCEDURE — 92960 CARDIOVERSION ELECTRIC EXT: CPT

## 2017-09-22 PROCEDURE — 93010 ELECTROCARDIOGRAM REPORT: CPT | Mod: 76

## 2017-09-22 RX ORDER — RIVAROXABAN 15 MG-20MG
1 KIT ORAL
Qty: 30 | Refills: 0
Start: 2017-09-22 | End: 2017-10-22

## 2017-09-22 RX ORDER — FUROSEMIDE 40 MG
1 TABLET ORAL
Qty: 60 | Refills: 0
Start: 2017-09-22 | End: 2017-10-22

## 2017-09-22 RX ORDER — SPIRONOLACTONE 25 MG/1
1 TABLET, FILM COATED ORAL
Qty: 30 | Refills: 0
Start: 2017-09-22 | End: 2017-10-22

## 2017-09-22 RX ORDER — FUROSEMIDE 40 MG
40 TABLET ORAL
Qty: 0 | Refills: 0 | Status: DISCONTINUED | OUTPATIENT
Start: 2017-09-22 | End: 2017-09-22

## 2017-09-22 RX ORDER — LISINOPRIL 2.5 MG/1
1 TABLET ORAL
Qty: 30 | Refills: 0
Start: 2017-09-22 | End: 2017-10-22

## 2017-09-22 RX ORDER — POTASSIUM CHLORIDE 20 MEQ
2 PACKET (EA) ORAL
Qty: 60 | Refills: 0
Start: 2017-09-22 | End: 2017-10-22

## 2017-09-22 RX ORDER — SOTALOL HCL 120 MG
1 TABLET ORAL
Qty: 60 | Refills: 0
Start: 2017-09-22 | End: 2017-10-22

## 2017-09-22 RX ADMIN — LISINOPRIL 5 MILLIGRAM(S): 2.5 TABLET ORAL at 06:34

## 2017-09-22 RX ADMIN — SPIRONOLACTONE 25 MILLIGRAM(S): 25 TABLET, FILM COATED ORAL at 06:34

## 2017-09-22 RX ADMIN — Medication 1 TABLET(S): at 12:34

## 2017-09-22 RX ADMIN — Medication 40 MILLIEQUIVALENT(S): at 12:34

## 2017-09-22 RX ADMIN — Medication 40 MILLIGRAM(S): at 06:34

## 2017-09-22 RX ADMIN — Medication 120 MILLIGRAM(S): at 06:34

## 2017-09-22 NOTE — DISCHARGE NOTE ADULT - CARE PLAN
Principal Discharge DX:	Acute congestive heart failure, unspecified congestive heart failure type  Goal:	to keep sx free  Instructions for follow-up, activity and diet:	f/u with pcp and cardio in 1-2 days

## 2017-09-22 NOTE — DISCHARGE NOTE ADULT - MEDICATION SUMMARY - MEDICATIONS TO TAKE
I will START or STAY ON the medications listed below when I get home from the hospital:    spironolactone 25 mg oral tablet  -- 1 tab(s) by mouth once a day  -- Indication: For chf    lisinopril 5 mg oral tablet  -- 1 tab(s) by mouth once a day  -- Indication: For htn    sotalol 120 mg oral tablet  -- 1 tab(s) by mouth every 12 hours  -- Indication: For A flutter    rivaroxaban 20 mg oral tablet  -- 1 tab(s) by mouth every 24 hours  -- Indication: For A flutter    furosemide 40 mg oral tablet  -- 1 tab(s) by mouth 2 times a day  -- Indication: For chf    potassium chloride 20 mEq oral tablet, extended release  -- 2 tab(s) by mouth once a day  -- Indication: For chf    Multiple Vitamins oral tablet  -- 1 tab(s) by mouth once a day  -- Indication: For suppl I will START or STAY ON the medications listed below when I get home from the hospital:    spironolactone 25 mg oral tablet  -- 1 tab(s) by mouth once a day  -- Indication: For chf    lisinopril 5 mg oral tablet  -- 1 tab(s) by mouth once a day  -- Indication: For chf    sotalol 120 mg oral tablet  -- 1 tab(s) by mouth every 12 hours  -- Indication: For A fib    rivaroxaban 20 mg oral tablet  -- 1 tab(s) by mouth every 24 hours  -- Indication: For A fib    furosemide 40 mg oral tablet  -- 1 tab(s) by mouth 2 times a day  -- Indication: For chf    potassium chloride 20 mEq oral tablet, extended release  -- 2 tab(s) by mouth once a day  -- Indication: For chf    Multiple Vitamins oral tablet  -- 1 tab(s) by mouth once a day  -- Indication: For suppl

## 2017-09-22 NOTE — DISCHARGE NOTE ADULT - HOSPITAL COURSE
58yo male a/w SOB, Aflutter with RVR    # SOB/Aflutter with RVR/Acute decompensated systolic CHF-EF 38% on stress test  Dilated cardiomyopathy  S/P WENDI and successful DCCV, went back to A Fib/flutter after few hours.  S/P cardizem drip  Dr Stevenson consult appreciated, started on sotalol  Dr Jacobo follow up appreciated  -Continue Lasix po BID, cont aldactone, ACE I  - Continue xarelto  - low salt diet  Fluid restrictions  Stress report: no ischemia,   Sotalol increased to 120 mg bid as per EP on 9/21  DCCV on 9/22 done successfully; in NSR, d/c home today  f/u with cardio/pcp in 1-2 days  f/u with EP cardio     #Hypokalemia- resolved. K 3.9; recheck in 1 week with pcp    # Obesity  Possibly OMAYRA    # Borderline DM-  Counselled    poc discussed with pt, team.    total time spent 45 min

## 2017-09-22 NOTE — PROGRESS NOTE ADULT - SUBJECTIVE AND OBJECTIVE BOX
Patient is a 57y old  Male who presents with a chief complaint of I was sick, I had trouble breathing, severely bloated, fatigued, gaining weight (13 Sep 2017 22:00)      Followup HPI:  9/14- Less breathlessness and abdominal bloating.. No chest pain.  9/15-Much less breathless. Abdominal bloating resolved.  9/16- no complaints.  9/18 no new complaints  9/19  feels well rhythm is aflutter/fib currently  9/20 no complaints currently in afib  9/21 no complaints  9/22- no complaints    PAST MEDICAL & SURGICAL HISTORY:  Obesity  No significant past surgical history      Review of symptoms:  Negative except for as noted in today's HPI.      MEDICATIONS  (STANDING):  docusate sodium 100 milliGRAM(s) Oral three times a day  multivitamin 1 Tablet(s) Oral daily  furosemide   Injectable 40 milliGRAM(s) IV Push two times a day  aminophylline  Injectable 100 milliGRAM(s) IV Push once  rivaroxaban 20 milliGRAM(s) Oral every 24 hours  potassium chloride    Tablet ER 40 milliEquivalent(s) Oral daily  lisinopril 5 milliGRAM(s) Oral daily  spironolactone 25 milliGRAM(s) Oral daily  sotalol 120 milliGRAM(s) Oral every 12 hours    MEDICATIONS  (PRN):  ondansetron Injectable 4 milliGRAM(s) IV Push every 6 hours PRN Nausea          Vital Signs Last 24 Hrs  T(C): 36.9 (22 Sep 2017 05:26), Max: 37.2 (21 Sep 2017 11:26)  T(F): 98.4 (22 Sep 2017 05:26), Max: 98.9 (21 Sep 2017 11:26)  HR: 84 (22 Sep 2017 05:26) (84 - 108)  BP: 99/51 (22 Sep 2017 05:26) (99/51 - 102/63)  BP(mean): --  RR: 16 (22 Sep 2017 05:26) (16 - 17)  SpO2: 99% (22 Sep 2017 05:26) (92% - 99%)    I&O's Summary      PHYSICAL EXAM  General Appearance:  HEENT:   Neck:   Lungs: clear  Heart: no murmur , rub, gallop  Abdomen:   Extremities: 1+ edema left leg, 2 + right leg  Neurologic:       INTERPRETATION OF TELEMETRY: atrial fib 40s- 120s.     ECG:    LABS:                          15.8   14.3  )-----------( 237      ( 21 Sep 2017 06:21 )             49.1     09-21    137  |  101  |  23  ----------------------------<  73  4.1   |  27  |  1.15    Ca    8.7      21 Sep 2017 06:21                        RADIOLOGY & ADDITIONAL STUDIES:    IMPRESSION:  1. Mild Dilated cardiomyopathy. CHF much improved.  2.Atrial fibrillation. s/p cardioversion. back in afl/afib. now on sotalol  PLAN:  For electrical cardioversion today.  Continue sotalol, furosemide, Xarelto, spironolactone.   Hope to discharge post cardioversion.

## 2017-09-22 NOTE — DISCHARGE NOTE ADULT - PATIENT PORTAL LINK FT
“You can access the Follow[employer] Patient Portal, offered by [facility], by registering with the following website: [email or web address]”

## 2017-09-22 NOTE — DISCHARGE NOTE ADULT - CARE PROVIDER_API CALL
Florian Clement), Internal Medicine  [address]  [address]  [place] [address]  Phone: [phone]  Fax: [phone]    Von Simpson), Cardiovascular Disease; Internal Medicine  [address] [place] [address]  Phone: [phone]  Fax: [phone]

## 2017-09-26 DIAGNOSIS — I11.0 HYPERTENSIVE HEART DISEASE WITH HEART FAILURE: ICD-10-CM

## 2017-09-26 DIAGNOSIS — R73.03 PREDIABETES: ICD-10-CM

## 2017-09-26 DIAGNOSIS — I42.9 CARDIOMYOPATHY, UNSPECIFIED: ICD-10-CM

## 2017-09-26 DIAGNOSIS — E66.01 MORBID (SEVERE) OBESITY DUE TO EXCESS CALORIES: ICD-10-CM

## 2017-09-26 DIAGNOSIS — R06.02 SHORTNESS OF BREATH: ICD-10-CM

## 2017-09-26 DIAGNOSIS — I48.92 UNSPECIFIED ATRIAL FLUTTER: ICD-10-CM

## 2017-09-26 DIAGNOSIS — E87.6 HYPOKALEMIA: ICD-10-CM

## 2017-09-26 DIAGNOSIS — I50.23 ACUTE ON CHRONIC SYSTOLIC (CONGESTIVE) HEART FAILURE: ICD-10-CM

## 2017-09-26 DIAGNOSIS — I48.91 UNSPECIFIED ATRIAL FIBRILLATION: ICD-10-CM

## 2017-10-26 ENCOUNTER — APPOINTMENT (OUTPATIENT)
Dept: ELECTROPHYSIOLOGY | Facility: CLINIC | Age: 57
End: 2017-10-26

## 2021-02-22 NOTE — PACU DISCHARGE NOTE - NAUSEA/VOMITING:
None Price (Use Numbers Only, No Special Characters Or $): 85 Consent: The patient's consent was obtained including but not limited to risks of crusting, blistering, scarring, pigmentary change, recurrence, incomplete removal. The patient understands that the procedure is cosmetic in nature and is not covered by insurance. Detail Level: Simple Billing Information: Bill by Static Price Render Post-Care Instructions In Note?: no Post-Care Instructions: I reviewed with the patient in detail post-care instructions. Patient is to wear sunprotection, and avoid picking at any of the treated lesions. Pt may apply Vaseline to crusted or scabbing areas.

## 2021-05-14 ENCOUNTER — INPATIENT (INPATIENT)
Facility: HOSPITAL | Age: 61
LOS: 3 days | Discharge: HOME | DRG: 308 | End: 2021-05-18
Attending: FAMILY MEDICINE | Admitting: EMERGENCY MEDICINE
Payer: COMMERCIAL

## 2021-05-14 ENCOUNTER — TRANSCRIPTION ENCOUNTER (OUTPATIENT)
Age: 61
End: 2021-05-14

## 2021-05-14 VITALS — WEIGHT: 315 LBS | HEIGHT: 78 IN

## 2021-05-14 DIAGNOSIS — I48.91 UNSPECIFIED ATRIAL FIBRILLATION: ICD-10-CM

## 2021-05-14 DIAGNOSIS — Z90.89 ACQUIRED ABSENCE OF OTHER ORGANS: Chronic | ICD-10-CM

## 2021-05-14 LAB
ALBUMIN SERPL ELPH-MCNC: 3.2 G/DL — LOW (ref 3.3–5)
ALP SERPL-CCNC: 61 U/L — SIGNIFICANT CHANGE UP (ref 40–120)
ALT FLD-CCNC: 40 U/L — SIGNIFICANT CHANGE UP (ref 12–78)
ANION GAP SERPL CALC-SCNC: 6 MMOL/L — SIGNIFICANT CHANGE UP (ref 5–17)
APPEARANCE UR: CLEAR — SIGNIFICANT CHANGE UP
APTT BLD: 32.1 SEC — SIGNIFICANT CHANGE UP (ref 27.5–35.5)
AST SERPL-CCNC: 19 U/L — SIGNIFICANT CHANGE UP (ref 15–37)
BASOPHILS # BLD AUTO: 0.04 K/UL — SIGNIFICANT CHANGE UP (ref 0–0.2)
BASOPHILS NFR BLD AUTO: 0.4 % — SIGNIFICANT CHANGE UP (ref 0–2)
BILIRUB SERPL-MCNC: 0.8 MG/DL — SIGNIFICANT CHANGE UP (ref 0.2–1.2)
BILIRUB UR-MCNC: NEGATIVE — SIGNIFICANT CHANGE UP
BUN SERPL-MCNC: 15 MG/DL — SIGNIFICANT CHANGE UP (ref 7–23)
CALCIUM SERPL-MCNC: 8.4 MG/DL — LOW (ref 8.5–10.1)
CHLORIDE SERPL-SCNC: 108 MMOL/L — SIGNIFICANT CHANGE UP (ref 96–108)
CO2 SERPL-SCNC: 26 MMOL/L — SIGNIFICANT CHANGE UP (ref 22–31)
COLOR SPEC: YELLOW — SIGNIFICANT CHANGE UP
CREAT SERPL-MCNC: 0.8 MG/DL — SIGNIFICANT CHANGE UP (ref 0.5–1.3)
DIFF PNL FLD: NEGATIVE — SIGNIFICANT CHANGE UP
EOSINOPHIL # BLD AUTO: 0.11 K/UL — SIGNIFICANT CHANGE UP (ref 0–0.5)
EOSINOPHIL NFR BLD AUTO: 1 % — SIGNIFICANT CHANGE UP (ref 0–6)
GLUCOSE SERPL-MCNC: 115 MG/DL — HIGH (ref 70–99)
GLUCOSE UR QL: NEGATIVE MG/DL — SIGNIFICANT CHANGE UP
HCT VFR BLD CALC: 41.7 % — SIGNIFICANT CHANGE UP (ref 39–50)
HGB BLD-MCNC: 13.1 G/DL — SIGNIFICANT CHANGE UP (ref 13–17)
IMM GRANULOCYTES NFR BLD AUTO: 0.4 % — SIGNIFICANT CHANGE UP (ref 0–1.5)
INR BLD: 1.26 RATIO — HIGH (ref 0.88–1.16)
KETONES UR-MCNC: NEGATIVE — SIGNIFICANT CHANGE UP
LEUKOCYTE ESTERASE UR-ACNC: NEGATIVE — SIGNIFICANT CHANGE UP
LYMPHOCYTES # BLD AUTO: 2.52 K/UL — SIGNIFICANT CHANGE UP (ref 1–3.3)
LYMPHOCYTES # BLD AUTO: 23.6 % — SIGNIFICANT CHANGE UP (ref 13–44)
MAGNESIUM SERPL-MCNC: 2 MG/DL — SIGNIFICANT CHANGE UP (ref 1.6–2.6)
MCHC RBC-ENTMCNC: 29 PG — SIGNIFICANT CHANGE UP (ref 27–34)
MCHC RBC-ENTMCNC: 31.4 GM/DL — LOW (ref 32–36)
MCV RBC AUTO: 92.5 FL — SIGNIFICANT CHANGE UP (ref 80–100)
MONOCYTES # BLD AUTO: 0.67 K/UL — SIGNIFICANT CHANGE UP (ref 0–0.9)
MONOCYTES NFR BLD AUTO: 6.3 % — SIGNIFICANT CHANGE UP (ref 2–14)
NEUTROPHILS # BLD AUTO: 7.28 K/UL — SIGNIFICANT CHANGE UP (ref 1.8–7.4)
NEUTROPHILS NFR BLD AUTO: 68.3 % — SIGNIFICANT CHANGE UP (ref 43–77)
NITRITE UR-MCNC: NEGATIVE — SIGNIFICANT CHANGE UP
NT-PROBNP SERPL-SCNC: 1603 PG/ML — HIGH (ref 0–125)
PH UR: 5 — SIGNIFICANT CHANGE UP (ref 5–8)
PLATELET # BLD AUTO: 295 K/UL — SIGNIFICANT CHANGE UP (ref 150–400)
POTASSIUM SERPL-MCNC: 3.5 MMOL/L — SIGNIFICANT CHANGE UP (ref 3.5–5.3)
POTASSIUM SERPL-SCNC: 3.5 MMOL/L — SIGNIFICANT CHANGE UP (ref 3.5–5.3)
PROT SERPL-MCNC: 7.2 GM/DL — SIGNIFICANT CHANGE UP (ref 6–8.3)
PROT UR-MCNC: NEGATIVE MG/DL — SIGNIFICANT CHANGE UP
PROTHROM AB SERPL-ACNC: 14.5 SEC — HIGH (ref 10.6–13.6)
RBC # BLD: 4.51 M/UL — SIGNIFICANT CHANGE UP (ref 4.2–5.8)
RBC # FLD: 15.3 % — HIGH (ref 10.3–14.5)
SARS-COV-2 RNA SPEC QL NAA+PROBE: SIGNIFICANT CHANGE UP
SODIUM SERPL-SCNC: 140 MMOL/L — SIGNIFICANT CHANGE UP (ref 135–145)
SP GR SPEC: 1.01 — SIGNIFICANT CHANGE UP (ref 1.01–1.02)
TROPONIN I SERPL-MCNC: <0.015 NG/ML — SIGNIFICANT CHANGE UP (ref 0.01–0.04)
UROBILINOGEN FLD QL: NEGATIVE MG/DL — SIGNIFICANT CHANGE UP
WBC # BLD: 10.66 K/UL — HIGH (ref 3.8–10.5)
WBC # FLD AUTO: 10.66 K/UL — HIGH (ref 3.8–10.5)

## 2021-05-14 PROCEDURE — 93325 DOPPLER ECHO COLOR FLOW MAPG: CPT

## 2021-05-14 PROCEDURE — 99291 CRITICAL CARE FIRST HOUR: CPT

## 2021-05-14 PROCEDURE — 93010 ELECTROCARDIOGRAM REPORT: CPT

## 2021-05-14 PROCEDURE — 86769 SARS-COV-2 COVID-19 ANTIBODY: CPT

## 2021-05-14 PROCEDURE — 99223 1ST HOSP IP/OBS HIGH 75: CPT

## 2021-05-14 PROCEDURE — 80048 BASIC METABOLIC PNL TOTAL CA: CPT

## 2021-05-14 PROCEDURE — U0003: CPT

## 2021-05-14 PROCEDURE — A9500: CPT

## 2021-05-14 PROCEDURE — 93017 CV STRESS TEST TRACING ONLY: CPT

## 2021-05-14 PROCEDURE — 78452 HT MUSCLE IMAGE SPECT MULT: CPT

## 2021-05-14 PROCEDURE — 84443 ASSAY THYROID STIM HORMONE: CPT

## 2021-05-14 PROCEDURE — 85730 THROMBOPLASTIN TIME PARTIAL: CPT

## 2021-05-14 PROCEDURE — 84484 ASSAY OF TROPONIN QUANT: CPT

## 2021-05-14 PROCEDURE — 85610 PROTHROMBIN TIME: CPT

## 2021-05-14 PROCEDURE — 81003 URINALYSIS AUTO W/O SCOPE: CPT

## 2021-05-14 PROCEDURE — 86803 HEPATITIS C AB TEST: CPT

## 2021-05-14 PROCEDURE — 80061 LIPID PANEL: CPT

## 2021-05-14 PROCEDURE — 85027 COMPLETE CBC AUTOMATED: CPT

## 2021-05-14 PROCEDURE — 93005 ELECTROCARDIOGRAM TRACING: CPT

## 2021-05-14 PROCEDURE — 93306 TTE W/DOPPLER COMPLETE: CPT

## 2021-05-14 PROCEDURE — U0005: CPT

## 2021-05-14 PROCEDURE — 71045 X-RAY EXAM CHEST 1 VIEW: CPT | Mod: 26

## 2021-05-14 PROCEDURE — 83036 HEMOGLOBIN GLYCOSYLATED A1C: CPT

## 2021-05-14 PROCEDURE — 83735 ASSAY OF MAGNESIUM: CPT

## 2021-05-14 PROCEDURE — 36415 COLL VENOUS BLD VENIPUNCTURE: CPT

## 2021-05-14 PROCEDURE — 93312 ECHO TRANSESOPHAGEAL: CPT

## 2021-05-14 PROCEDURE — 93320 DOPPLER ECHO COMPLETE: CPT

## 2021-05-14 RX ORDER — FUROSEMIDE 40 MG
40 TABLET ORAL ONCE
Refills: 0 | Status: COMPLETED | OUTPATIENT
Start: 2021-05-14 | End: 2021-05-14

## 2021-05-14 RX ORDER — HEPARIN SODIUM 5000 [USP'U]/ML
INJECTION INTRAVENOUS; SUBCUTANEOUS
Qty: 25000 | Refills: 0 | Status: DISCONTINUED | OUTPATIENT
Start: 2021-05-14 | End: 2021-05-15

## 2021-05-14 RX ORDER — DILTIAZEM HCL 120 MG
30 CAPSULE, EXT RELEASE 24 HR ORAL ONCE
Refills: 0 | Status: COMPLETED | OUTPATIENT
Start: 2021-05-14 | End: 2021-05-14

## 2021-05-14 RX ORDER — ASPIRIN/CALCIUM CARB/MAGNESIUM 324 MG
324 TABLET ORAL ONCE
Refills: 0 | Status: COMPLETED | OUTPATIENT
Start: 2021-05-14 | End: 2021-05-14

## 2021-05-14 RX ORDER — DILTIAZEM HCL 120 MG
20 CAPSULE, EXT RELEASE 24 HR ORAL ONCE
Refills: 0 | Status: COMPLETED | OUTPATIENT
Start: 2021-05-14 | End: 2021-05-14

## 2021-05-14 RX ORDER — DILTIAZEM HCL 120 MG
60 CAPSULE, EXT RELEASE 24 HR ORAL ONCE
Refills: 0 | Status: COMPLETED | OUTPATIENT
Start: 2021-05-14 | End: 2021-05-14

## 2021-05-14 RX ADMIN — Medication 60 MILLIGRAM(S): at 17:58

## 2021-05-14 RX ADMIN — Medication 30 MILLIGRAM(S): at 15:45

## 2021-05-14 RX ADMIN — Medication 324 MILLIGRAM(S): at 13:51

## 2021-05-14 RX ADMIN — Medication 40 MILLIGRAM(S): at 15:45

## 2021-05-14 RX ADMIN — HEPARIN SODIUM 2400 UNIT(S)/HR: 5000 INJECTION INTRAVENOUS; SUBCUTANEOUS at 17:57

## 2021-05-14 RX ADMIN — Medication 20 MILLIGRAM(S): at 13:50

## 2021-05-14 NOTE — ED ADULT NURSE NOTE - OBJECTIVE STATEMENT
pt presents to ed ambulatory for evaluation of worsening SOB x 2 days. pt has hx of covid in december and chf. in ed, pt hr 130s afib ( cardizzem given) and bp normotensive. pt a&ox4, in no resp distress.

## 2021-05-14 NOTE — ED ADULT TRIAGE NOTE - CHIEF COMPLAINT QUOTE
Pt presents to er with complaints of SOB and weight gain, pt was seen at urgent care and was told that he probably is in heart failure at this time and was sent in for evaluation, pt with labored breathing during exertion.

## 2021-05-14 NOTE — ED PROVIDER NOTE - CLINICAL SUMMARY MEDICAL DECISION MAKING FREE TEXT BOX
61 y/o male who presents to ED with exertional dyspnea, weight gain. On arrival Afib to 140s. Concern for Afib. Will obtain labs, rate control, and admit.

## 2021-05-14 NOTE — H&P ADULT - HISTORY OF PRESENT ILLNESS
Pt is 61 y/o male with PMHx of Afib, obesity, COVID in December 2020 presents to ED for worsening SOB x2 days. SOB is exacerbated by exertion. Also reports feeling bloated recently, gaining 14 pounds in the past 2 weeks. Reports that he has a hx of Afib, but is non compliant with anti-coagulants recently.       Pt was found to be in rapid Afib to 130s in intake and was sent to main ED Pt is 59 y/o male with PMHx of Obesity, Dilated Cardiomyopathy with EF 38% (2017) , Systolic CHF,  Afib s/p prior WENDI cardioversion (2017), possible OMAYRA,  COVID in December 2020 who was sent to the  ED by [facility]  for worsening SOB x 2 days. SOB is exacerbated by exertion.     Pt c/o  feeling bloated recently, gaining 14 pounds in the past 2 weeks. Reports that he has a hx of Afib, but is non compliant with anti-coagulants recently.    ALL: NKDA    Prior meds  in 2017  Rx: Xarelto, Spironolactone, Lisinopril 5 Q24, Lopressor 25 QID, K-Dur, lasix, sotalol 120mg BID       Pt was found to be in rapid Afib to 130s in intake and was sent to main ED Pt is pleasant 59 y/o male with PMHx of Obesity, Dilated Cardiomyopathy with EF 38% (2017) , Systolic CHF,  Afib s/p prior WENDI cardioversion (2017), possible OMAYRA,  COVID in December 2020 who was sent to the  ED by [facility]  for worsening SOB x 2 days and GREY.  Pt reports he has noted increasing SOB for the last 8 days and  feeling bloated recently, gaining 14 pounds in the past 2 weeks.   Pt reports he stopped taking his meds and anticoagulation since early 2019 at which time he states he was in NSR.  Since then  he had been trying to lose weight on his own and take care of himself without meds.  He recently had dental extractions due to gum disease and received a course of Amoxicillin.   In the ED pt was found to be in AF RVR and  CHF    Prior meds  in 2017 :  Rx: Xarelto, Spironolactone, Lisinopril 5 Q24, Lopressor 25 QID, K-Dur, lasix, sotalol 120mg BID       Pt was found to be in rapid Afib to 130s in intake and was sent to main ED

## 2021-05-14 NOTE — H&P ADULT - NSHPLABSRESULTS_GEN_ALL_CORE
In 2017:     # SOB/Aflutter with RVR/Acute decompensated systolic CHF-EF 38% on stress test  Dilated cardiomyopathy    S/P WENDI and successful DCCV, went back to A Fib/flutter after few hours.  S/P cardizem drip  Dr Stevenson consult appreciated, started on sotalol  Dr Jacobo follow up appreciated  -Continue Lasix po BID, cont aldactone, ACE I  - Continue xarelto  - low salt diet  Fluid restrictions  Stress report: no ischemia,   Sotalol increased to 120 mg bid as per EP on 9/21  DCCV on 9/22 done successfully; in NSR, d/c home today  f/u with cardio/pcp in 1-2 days

## 2021-05-14 NOTE — ED STATDOCS - PROGRESS NOTE DETAILS
Radha Shaffer for attending Dr. Charles: 61 y/o male with a PMHx of Afib, obesity presents to the ED c/o SOB and weight gain. Pt reports he gained 14lbs over the last 2 weeks. Pt was seen at urgent care PTA and was sent to the ED for further evaluation. EKG with Afib to 130s. Will send pt to main ED for further evaluation.

## 2021-05-14 NOTE — H&P ADULT - NSHPPHYSICALEXAM_GEN_ALL_CORE
ICU Vital Signs Last 24 Hrs    T(F): 98.1 (14 May 2021 13:23), Max: 98.1 (14 May 2021 13:23)  HR: 111 (14 May 2021 18:02) (110 - 130)  BP: 140/62 (14 May 2021 18:02) (123/70 - 161/102)  BP(mean): 115 (14 May 2021 13:23) (115 - 115)    RR: 20 (14 May 2021 18:02) (18 - 20)  SpO2: 94% (14 May 2021 18:02) (93% - 94%)

## 2021-05-14 NOTE — ED PROVIDER NOTE - OBJECTIVE STATEMENT
61 y/o male with PMHx of Afib, obesity, COVID in December 2020 presents to ED for worsening SOB x2 days. SOB is exacerbated by exertion. Also reports feeling bloated recently, gaining 14 pounds in the past 2 weeks. Reports that he has a hx of Afib, but is non compliant with anti-coagulants recently. Pt was found to be in rapid Afib to 130s in intake and was sent to main ED.

## 2021-05-14 NOTE — ED PROVIDER NOTE - NS ED ROS FT
Constitutional: No fever or chills. +Weight gain, bloating  Eyes: No visual changes  HEENT: No throat pain  CV: No chest pain  Resp: no cough. +SOB  GI: No abd pain, nausea or vomiting  : No dysuria  MSK: No musculoskeletal pain  Skin: No rash  Neuro: No headache

## 2021-05-14 NOTE — H&P ADULT - NSHPSOCIALHISTORY_GEN_ALL_CORE
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  Pt denied  tobacco, ETOH or drug use.  He reports ETOH use remotely.  Pt lives with his wife and son.

## 2021-05-14 NOTE — ED PROVIDER NOTE - PHYSICAL EXAMINATION
Constitutional: NAD AAOx3. Obese  Eyes: PERRLA EOMI  Head: Normocephalic atraumatic  Mouth: MMM  Cardiac: Irregularly irregular, tachycardic rate.  Resp: Lungs CTAB  GI: Abd s/nt/nd  Neuro: CN2-12 intact  Skin: No rashes. 2+ LE edema.

## 2021-05-14 NOTE — ED PROVIDER NOTE - NS_ ATTENDINGSCRIBEDETAILS _ED_A_ED_FT
I, Leopoldo Murdock MD,  performed the initial face to face bedside interview with this patient regarding history of present illness, review of symptoms and relevant past medical, social and family history.  I completed an independent physical examination.  I was the initial provider who evaluated this patient.  The history, relevant review of systems, past medical and surgical history, medical decision making, and physical examination was documented by the scribe in my presence and I attest to the accuracy of the documentation.

## 2021-05-14 NOTE — ED ADULT NURSE REASSESSMENT NOTE - NS ED NURSE REASSESS COMMENT FT1
pt resting comfortably, vitals stable, heparin infusion began, pt aware of plan of care, dinner ordered

## 2021-05-14 NOTE — H&P ADULT - NSICDXFAMILYHX_GEN_ALL_CORE_FT
FAMILY HISTORY:  Father  Still living? Unknown  FH: CAD (coronary artery disease), Age at diagnosis: 81-90    Mother  Still living? Unknown  FH: CAD (coronary artery disease), Age at diagnosis: 81-90

## 2021-05-14 NOTE — ED PROVIDER NOTE - PROGRESS NOTE DETAILS
chadsvasc score 2 will start heparin. pt endorsed to Dr. Wong accepts. Leopoldo Murdock M.D., Attending Physician

## 2021-05-14 NOTE — H&P ADULT - ASSESSMENT
Pt is admitted w/    Atrial fib RVR  CHF exac  SOB  Poor medication compliance  Hyperglycemia   Pt is admitted w/    Atrial fib RVR  CHF exac  SOB  Poor medication compliance  Hyperglycemia  - adm to telemetry  - s/p cardizem in the ED, cont   - s/p heparin drip  - lasix 40mg BID  - add statin and lisinopril  - trend troponins  - echo  - pt counseled on close follow up  - cardiology consult  - DVt prophylaxis : pt on heparin iv

## 2021-05-15 LAB
A1C WITH ESTIMATED AVERAGE GLUCOSE RESULT: 6.3 % — HIGH (ref 4–5.6)
ANION GAP SERPL CALC-SCNC: 8 MMOL/L — SIGNIFICANT CHANGE UP (ref 5–17)
APTT BLD: 87.5 SEC — HIGH (ref 27.5–35.5)
APTT BLD: 90 SEC — HIGH (ref 27.5–35.5)
BUN SERPL-MCNC: 14 MG/DL — SIGNIFICANT CHANGE UP (ref 7–23)
CALCIUM SERPL-MCNC: 8.6 MG/DL — SIGNIFICANT CHANGE UP (ref 8.5–10.1)
CHLORIDE SERPL-SCNC: 107 MMOL/L — SIGNIFICANT CHANGE UP (ref 96–108)
CHOLEST SERPL-MCNC: 134 MG/DL — SIGNIFICANT CHANGE UP
CO2 SERPL-SCNC: 28 MMOL/L — SIGNIFICANT CHANGE UP (ref 22–31)
COVID-19 SPIKE DOMAIN AB INTERP: POSITIVE
COVID-19 SPIKE DOMAIN ANTIBODY RESULT: >250 U/ML — HIGH
CREAT SERPL-MCNC: 0.7 MG/DL — SIGNIFICANT CHANGE UP (ref 0.5–1.3)
ESTIMATED AVERAGE GLUCOSE: 134 MG/DL — HIGH (ref 68–114)
GLUCOSE SERPL-MCNC: 124 MG/DL — HIGH (ref 70–99)
HCT VFR BLD CALC: 38.4 % — LOW (ref 39–50)
HCT VFR BLD CALC: 40 % — SIGNIFICANT CHANGE UP (ref 39–50)
HCV AB S/CO SERPL IA: 0.26 S/CO — SIGNIFICANT CHANGE UP (ref 0–0.99)
HCV AB SERPL-IMP: SIGNIFICANT CHANGE UP
HDLC SERPL-MCNC: 33 MG/DL — LOW
HGB BLD-MCNC: 12 G/DL — LOW (ref 13–17)
HGB BLD-MCNC: 12.4 G/DL — LOW (ref 13–17)
LIPID PNL WITH DIRECT LDL SERPL: 87 MG/DL — SIGNIFICANT CHANGE UP
MCHC RBC-ENTMCNC: 28.9 PG — SIGNIFICANT CHANGE UP (ref 27–34)
MCHC RBC-ENTMCNC: 29 PG — SIGNIFICANT CHANGE UP (ref 27–34)
MCHC RBC-ENTMCNC: 31 GM/DL — LOW (ref 32–36)
MCHC RBC-ENTMCNC: 31.3 GM/DL — LOW (ref 32–36)
MCV RBC AUTO: 92.8 FL — SIGNIFICANT CHANGE UP (ref 80–100)
MCV RBC AUTO: 93.2 FL — SIGNIFICANT CHANGE UP (ref 80–100)
NON HDL CHOLESTEROL: 101 MG/DL — SIGNIFICANT CHANGE UP
PLATELET # BLD AUTO: 261 K/UL — SIGNIFICANT CHANGE UP (ref 150–400)
PLATELET # BLD AUTO: 272 K/UL — SIGNIFICANT CHANGE UP (ref 150–400)
POTASSIUM SERPL-MCNC: 3.2 MMOL/L — LOW (ref 3.5–5.3)
POTASSIUM SERPL-SCNC: 3.2 MMOL/L — LOW (ref 3.5–5.3)
RBC # BLD: 4.14 M/UL — LOW (ref 4.2–5.8)
RBC # BLD: 4.29 M/UL — SIGNIFICANT CHANGE UP (ref 4.2–5.8)
RBC # FLD: 15.6 % — HIGH (ref 10.3–14.5)
RBC # FLD: 15.7 % — HIGH (ref 10.3–14.5)
SARS-COV-2 IGG+IGM SERPL QL IA: >250 U/ML — HIGH
SARS-COV-2 IGG+IGM SERPL QL IA: POSITIVE
SODIUM SERPL-SCNC: 143 MMOL/L — SIGNIFICANT CHANGE UP (ref 135–145)
TRIGL SERPL-MCNC: 70 MG/DL — SIGNIFICANT CHANGE UP
TROPONIN I SERPL-MCNC: <0.015 NG/ML — SIGNIFICANT CHANGE UP (ref 0.01–0.04)
TSH SERPL-MCNC: 1.27 UU/ML — SIGNIFICANT CHANGE UP (ref 0.34–4.82)
WBC # BLD: 10.26 K/UL — SIGNIFICANT CHANGE UP (ref 3.8–10.5)
WBC # BLD: 14.33 K/UL — HIGH (ref 3.8–10.5)
WBC # FLD AUTO: 10.26 K/UL — SIGNIFICANT CHANGE UP (ref 3.8–10.5)
WBC # FLD AUTO: 14.33 K/UL — HIGH (ref 3.8–10.5)

## 2021-05-15 PROCEDURE — 93010 ELECTROCARDIOGRAM REPORT: CPT

## 2021-05-15 PROCEDURE — 93306 TTE W/DOPPLER COMPLETE: CPT | Mod: 26

## 2021-05-15 PROCEDURE — 99233 SBSQ HOSP IP/OBS HIGH 50: CPT

## 2021-05-15 RX ORDER — FUROSEMIDE 40 MG
40 TABLET ORAL
Refills: 0 | Status: DISCONTINUED | OUTPATIENT
Start: 2021-05-15 | End: 2021-05-16

## 2021-05-15 RX ORDER — ASPIRIN/CALCIUM CARB/MAGNESIUM 324 MG
81 TABLET ORAL DAILY
Refills: 0 | Status: DISCONTINUED | OUTPATIENT
Start: 2021-05-15 | End: 2021-05-16

## 2021-05-15 RX ORDER — DILTIAZEM HCL 120 MG
60 CAPSULE, EXT RELEASE 24 HR ORAL EVERY 6 HOURS
Refills: 0 | Status: DISCONTINUED | OUTPATIENT
Start: 2021-05-15 | End: 2021-05-15

## 2021-05-15 RX ORDER — HEPARIN SODIUM 5000 [USP'U]/ML
INJECTION INTRAVENOUS; SUBCUTANEOUS
Qty: 25000 | Refills: 0 | Status: DISCONTINUED | OUTPATIENT
Start: 2021-05-15 | End: 2021-05-15

## 2021-05-15 RX ORDER — DILTIAZEM HCL 120 MG
300 CAPSULE, EXT RELEASE 24 HR ORAL DAILY
Refills: 0 | Status: DISCONTINUED | OUTPATIENT
Start: 2021-05-15 | End: 2021-05-18

## 2021-05-15 RX ORDER — APIXABAN 2.5 MG/1
5 TABLET, FILM COATED ORAL EVERY 12 HOURS
Refills: 0 | Status: DISCONTINUED | OUTPATIENT
Start: 2021-05-15 | End: 2021-05-18

## 2021-05-15 RX ORDER — LISINOPRIL 2.5 MG/1
5 TABLET ORAL DAILY
Refills: 0 | Status: DISCONTINUED | OUTPATIENT
Start: 2021-05-15 | End: 2021-05-18

## 2021-05-15 RX ORDER — POTASSIUM CHLORIDE 20 MEQ
20 PACKET (EA) ORAL
Refills: 0 | Status: COMPLETED | OUTPATIENT
Start: 2021-05-15 | End: 2021-05-15

## 2021-05-15 RX ORDER — ATORVASTATIN CALCIUM 80 MG/1
20 TABLET, FILM COATED ORAL AT BEDTIME
Refills: 0 | Status: DISCONTINUED | OUTPATIENT
Start: 2021-05-15 | End: 2021-05-16

## 2021-05-15 RX ADMIN — APIXABAN 5 MILLIGRAM(S): 2.5 TABLET, FILM COATED ORAL at 21:00

## 2021-05-15 RX ADMIN — Medication 20 MILLIEQUIVALENT(S): at 22:47

## 2021-05-15 RX ADMIN — Medication 60 MILLIGRAM(S): at 05:19

## 2021-05-15 RX ADMIN — APIXABAN 5 MILLIGRAM(S): 2.5 TABLET, FILM COATED ORAL at 10:41

## 2021-05-15 RX ADMIN — Medication 81 MILLIGRAM(S): at 10:41

## 2021-05-15 RX ADMIN — ATORVASTATIN CALCIUM 20 MILLIGRAM(S): 80 TABLET, FILM COATED ORAL at 21:00

## 2021-05-15 RX ADMIN — HEPARIN SODIUM 2400 UNIT(S)/HR: 5000 INJECTION INTRAVENOUS; SUBCUTANEOUS at 02:01

## 2021-05-15 RX ADMIN — Medication 40 MILLIGRAM(S): at 18:33

## 2021-05-15 RX ADMIN — LISINOPRIL 5 MILLIGRAM(S): 2.5 TABLET ORAL at 10:41

## 2021-05-15 RX ADMIN — Medication 20 MILLIEQUIVALENT(S): at 18:33

## 2021-05-15 RX ADMIN — Medication 40 MILLIGRAM(S): at 10:41

## 2021-05-15 RX ADMIN — Medication 300 MILLIGRAM(S): at 10:41

## 2021-05-15 RX ADMIN — Medication 20 MILLIEQUIVALENT(S): at 20:19

## 2021-05-15 NOTE — PROGRESS NOTE ADULT - SUBJECTIVE AND OBJECTIVE BOX
CC: Atrial fib RVR  CHF exac  SOB  Poor medication compliance (15 May 2021 08:28)    HPI:  Pt is pleasant 61 y/o male with PMHx of Obesity, Dilated Cardiomyopathy with EF 38% (2017) , Systolic CHF,  Afib s/p prior WENDI cardioversion (), possible OMAYRA,  COVID in 2020 who was sent to the  ED by [facility]  for worsening SOB x 2 days and GREY.  Pt reports he has noted increasing SOB for the last 8 days and  feeling bloated recently, gaining 14 pounds in the past 2 weeks.   Pt reports he stopped taking his meds and anticoagulation since early  at which time he states he was in NSR.  Since then  he had been trying to lose weight on his own and take care of himself without meds.  He recently had dental extractions due to gum disease and received a course of Amoxicillin.   In the ED pt was found to be in AF RVR and  CHF    Prior meds  in 2017 :  Rx: Xarelto, Spironolactone, Lisinopril 5 Q24, Lopressor 25 QID, K-Dur, lasix, sotalol 120mg BID       Pt was found to be in rapid Afib to 130s in intake and was sent to main ED (14 May 2021 21:09)    INTERVAL HPI/OVERNIGHT EVENTS:    Vital Signs Last 24 Hrs  T(C): 36.6 (14 May 2021 22:01), Max: 36.7 (14 May 2021 13:23)  T(F): 97.9 (14 May 2021 22:01), Max: 98.1 (14 May 2021 13:23)  HR: 58 (15 May 2021 08:21) (58 - 130)  BP: 131/91 (15 May 2021 08:21) (123/70 - 161/102)  BP(mean): 115 (14 May 2021 13:23) (115 - 115)  RR: 18 (15 May 2021 08:21) (18 - 20)  SpO2: 94% (15 May 2021 08:21) (92% - 95%)  I&O's Detail    14 May 2021 07:01  -  15 May 2021 07:00  --------------------------------------------------------  IN:  Total IN: 0 mL    OUT:    Voided (mL): 2250 mL  Total OUT: 2250 mL    Total NET: -2250 mL        REVIEW OF SYSTEMS:    CONSTITUTIONAL: No weakness, fevers or chills  EYES/ENT: No visual changes;  No vertigo or throat pain   NECK: No pain or stiffness  RESPIRATORY: No cough, wheezing, hemoptysis; No shortness of breath  CARDIOVASCULAR: No chest pain or palpitations  GASTROINTESTINAL: No abdominal or epigastric pain. No nausea, vomiting, or hematemesis; No diarrhea or constipation. No melena or hematochezia.  GENITOURINARY: No dysuria, frequency or hematuria  NEUROLOGICAL: No numbness or weakness  SKIN: No itching, burning, rashes, or lesions   All other review of systems is negative unless indicated above.  PHYSICAL EXAM:    General: Well developed; well nourished; in no acute distress  Eyes: PERRLA, EOMI; conjunctiva and sclera clear  Head: Normocephalic; atraumatic  ENMT: No nasal discharge; airway clear  Neck: Supple; non tender; no masses  Respiratory: No wheezes, rales or rhonchi  Cardiovascular: Regular rate and rhythm. S1 and S2 Normal; No murmurs, gallops or rubs  Gastrointestinal: Soft non-tender non-distended; Normal bowel sounds  Genitourinary: No  suprapubic  tenderness  Extremities: Normal range of motion, No clubbing, cyanosis or edema  Vascular: Peripheral pulses palpable 2+ bilaterally  Neurological: Alert and oriented x4  Skin: Warm and dry. No acute rash  Lymph Nodes: No acute cervical adenopathy  Musculoskeletal: Normal muscle tone, without deformities  Psychiatric: Cooperative and appropriate  CARDIAC MARKERS ( 14 May 2021 13:48 )  <0.015 ng/mL / x     / x     / x     / x                                12.4   14.33 )-----------( 272      ( 14 May 2021 23:52 )             40.0     14 May 2021 13:48    140    |  108    |  15     ----------------------------<  115    3.5     |  26     |  0.80     Ca    8.4        14 May 2021 13:48  Mg     2.0       14 May 2021 13:48    TPro  7.2    /  Alb  3.2    /  TBili  0.8    /  DBili  x      /  AST  19     /  ALT  40     /  AlkPhos  61     14 May 2021 13:48    PT/INR - ( 14 May 2021 16:39 )   PT: 14.5 sec;   INR: 1.26 ratio         PTT - ( 14 May 2021 23:52 )  PTT:87.5 sec  CAPILLARY BLOOD GLUCOSE        LIVER FUNCTIONS - ( 14 May 2021 13:48 )  Alb: 3.2 g/dL / Pro: 7.2 gm/dL / ALK PHOS: 61 U/L / ALT: 40 U/L / AST: 19 U/L / GGT: x           Urinalysis Basic - ( 14 May 2021 19:44 )    Color: Yellow / Appearance: Clear / S.010 / pH: x  Gluc: x / Ketone: Negative  / Bili: Negative / Urobili: Negative mg/dL   Blood: x / Protein: Negative mg/dL / Nitrite: Negative   Leuk Esterase: Negative / RBC: x / WBC x   Sq Epi: x / Non Sq Epi: x / Bacteria: x        MEDICATIONS  (STANDING):  apixaban 5 milliGRAM(s) Oral every 12 hours  aspirin enteric coated 81 milliGRAM(s) Oral daily  atorvastatin 20 milliGRAM(s) Oral at bedtime  diltiazem    milliGRAM(s) Oral daily  furosemide   Injectable 40 milliGRAM(s) IV Push two times a day  lisinopril 5 milliGRAM(s) Oral daily    MEDICATIONS  (PRN):      RADIOLOGY & ADDITIONAL TESTS:    < from: Xray Chest 1 View AP/PA. (21 @ 14:13) >    EXAM:  XR CHEST 1 VIEW                            PROCEDURE DATE:  2021          INTERPRETATION:  Chest pain    A frontal chest film  demonstrates grossly clear lungs.  No acute infiltrate or consolidation is  noted. The costophrenic angles are grossly clear.    The heart is diffusely enlarged.    No pleural effusion suggested. .      IMPRESSION:  Cardiomegaly. Clear lungs.     CC: Atrial fib RVR  CHF exac  SOB  Poor medication compliance (15 May 2021 08:28)    HPI:  Pt is pleasant 59 y/o male with PMHx of Obesity, Dilated Cardiomyopathy with EF 38% (2017) , Systolic CHF,  Afib s/p prior WENDI cardioversion (), possible OMAYRA,  COVID in 2020 who was sent to the  ED by [facility]  for worsening SOB x 2 days and GREY.  Pt reports he has noted increasing SOB for the last 8 days and  feeling bloated recently, gaining 14 pounds in the past 2 weeks.   Pt reports he stopped taking his meds and anticoagulation since early  at which time he states he was in NSR.  Since then  he had been trying to lose weight on his own and take care of himself without meds.  He recently had dental extractions due to gum disease and received a course of Amoxicillin.   In the ED pt was found to be in AF RVR and  CHF    Prior meds  in 2017 :  Rx: Xarelto, Spironolactone, Lisinopril 5 Q24, Lopressor 25 QID, K-Dur, lasix, sotalol 120mg BID       Pt was found to be in rapid Afib to 130s in intake and was sent to main ED (14 May 2021 21:09)    INTERVAL HPI/ OVERNIGHT EVENTS: chart reviewed, Pt was seen and examined, reports breathing is better, also leg edema slightly improved. No CP, no palpitations. results and POC discussed    Vital Signs Last 24 Hrs  T(C): 36.6 (14 May 2021 22:01), Max: 36.7 (14 May 2021 13:23)  T(F): 97.9 (14 May 2021 22:01), Max: 98.1 (14 May 2021 13:23)  HR: 58 (15 May 2021 08:21) (58 - 130)  BP: 131/91 (15 May 2021 08:21) (123/70 - 161/102)  BP(mean): 115 (14 May 2021 13:23) (115 - 115)  RR: 18 (15 May 2021 08:21) (18 - 20)  SpO2: 94% (15 May 2021 08:21) (92% - 95%)          REVIEW OF SYSTEMS:  All other review of systems is negative unless indicated above.      PHYSICAL EXAM:  General: Well developed; obese,  in no acute distress  Eyes: PERRLA, EOMI; conjunctiva and sclera clear  Head: Normocephalic; atraumatic  ENMT: No nasal discharge; airway clear  Neck: Supple; non tender; no masses  Respiratory: Decreased BS,  No wheezes, rales or rhonchi  Cardiovascular: Irregular rate and rhythm. S1 and S2 Normal;   Gastrointestinal: Soft non-tender non-distended; Normal bowel sounds  Genitourinary: No  suprapubic  tenderness  Extremities: +2 edema  Vascular: Peripheral pulses palpable 2+ bilaterally  Neurological: Alert and oriented x3, non focal   Musculoskeletal: Normal muscle tone and strength   Psychiatric: Cooperative       LABS:   CARDIAC MARKERS ( 14 May 2021 13:48 )  <0.015 ng/mL / x     / x     / x     / x                                12.4   14.33 )-----------( 272      ( 14 May 2021 23:52 )             40.0     14 May 2021 13:48    140    |  108    |  15     ----------------------------<  115    3.5     |  26     |  0.80     Ca    8.4        14 May 2021 13:48  Mg     2.0       14 May 2021 13:48    TPro  7.2    /  Alb  3.2    /  TBili  0.8    /  DBili  x      /  AST  19     /  ALT  40     /  AlkPhos  61     14 May 2021 13:48  PT/INR - ( 14 May 2021 16:39 )   PT: 14.5 sec;   INR: 1.26 ratio    PTT - ( 14 May 2021 23:52 )  PTT:87.5 sec          LIVER FUNCTIONS - ( 14 May 2021 13:48 )  Alb: 3.2 g/dL / Pro: 7.2 gm/dL / ALK PHOS: 61 U/L / ALT: 40 U/L / AST: 19 U/L / GGT: x           Urinalysis Basic - ( 14 May 2021 19:44 )    Color: Yellow / Appearance: Clear / S.010 / pH: x  Gluc: x / Ketone: Negative  / Bili: Negative / Urobili: Negative mg/dL   Blood: x / Protein: Negative mg/dL / Nitrite: Negative   Leuk Esterase: Negative / RBC: x / WBC x   Sq Epi: x / Non Sq Epi: x / Bacteria: x        MEDICATIONS  (STANDING):  apixaban 5 milliGRAM(s) Oral every 12 hours  aspirin enteric coated 81 milliGRAM(s) Oral daily  atorvastatin 20 milliGRAM(s) Oral at bedtime  diltiazem    milliGRAM(s) Oral daily  furosemide   Injectable 40 milliGRAM(s) IV Push two times a day  lisinopril 5 milliGRAM(s) Oral daily    MEDICATIONS  (PRN):      RADIOLOGY & ADDITIONAL TESTS:    < from: Xray Chest 1 View AP/PA. (21 @ 14:13) >    EXAM:  XR CHEST 1 VIEW                            PROCEDURE DATE:  2021          INTERPRETATION:  Chest pain    A frontal chest film  demonstrates grossly clear lungs.  No acute infiltrate or consolidation is  noted. The costophrenic angles are grossly clear.    The heart is diffusely enlarged.    No pleural effusion suggested. .      IMPRESSION:  Cardiomegaly. Clear lungs.

## 2021-05-15 NOTE — PROGRESS NOTE ADULT - ASSESSMENT
Pt is admitted w/    Atrial fib RVR  CHF exac  SOB  Poor medication compliance  Hyperglycemia  - adm to telemetry  - s/p cardizem in the ED, cont   - s/p heparin drip  - lasix 40mg BID  - add statin and lisinopril  - trend troponins  - echo  - pt counseled on close follow up  - cardiology consult  - DVt prophylaxis : pt on heparin iv     Pt is admitted w/  61 y/o male with PMHx of Obesity, Dilated Cardiomyopathy with EF 38% (2017) , Systolic CHF,  Afib s/p prior WENDI cardioversion (2017), possible OMAYRA,  COVID in December 2020 admitted for:       Acute on Chronic Systolic CHF   likely triggered by AFIB with RVR and non compliance   Trop neg doubt ACS,   Monitor weight, is and os  C/w IV lasix 40mg BID, monitor renal FX  ECHO ordered  CArdio eval appreciated        PAroxysmal AFIB with RVR   - c/w  telemetry: AFIB rate better   - switched to Cardizem CD 300mg   -Started on Eliquis   -D/w Dr Miner possible WENDI/CV?         Obesity. Prediabetic   Pt states that on diet and exercising  and lost significant amount of weight  will continue   Low carb diet         DVT PPX: eliquis

## 2021-05-16 LAB
ANION GAP SERPL CALC-SCNC: 6 MMOL/L — SIGNIFICANT CHANGE UP (ref 5–17)
BUN SERPL-MCNC: 14 MG/DL — SIGNIFICANT CHANGE UP (ref 7–23)
CALCIUM SERPL-MCNC: 8.2 MG/DL — LOW (ref 8.5–10.1)
CHLORIDE SERPL-SCNC: 106 MMOL/L — SIGNIFICANT CHANGE UP (ref 96–108)
CO2 SERPL-SCNC: 29 MMOL/L — SIGNIFICANT CHANGE UP (ref 22–31)
CREAT SERPL-MCNC: 0.82 MG/DL — SIGNIFICANT CHANGE UP (ref 0.5–1.3)
GLUCOSE SERPL-MCNC: 116 MG/DL — HIGH (ref 70–99)
HCT VFR BLD CALC: 39.5 % — SIGNIFICANT CHANGE UP (ref 39–50)
HGB BLD-MCNC: 12.3 G/DL — LOW (ref 13–17)
MAGNESIUM SERPL-MCNC: 2.1 MG/DL — SIGNIFICANT CHANGE UP (ref 1.6–2.6)
MCHC RBC-ENTMCNC: 28.8 PG — SIGNIFICANT CHANGE UP (ref 27–34)
MCHC RBC-ENTMCNC: 31.1 GM/DL — LOW (ref 32–36)
MCV RBC AUTO: 92.5 FL — SIGNIFICANT CHANGE UP (ref 80–100)
PLATELET # BLD AUTO: 297 K/UL — SIGNIFICANT CHANGE UP (ref 150–400)
POTASSIUM SERPL-MCNC: 3.4 MMOL/L — LOW (ref 3.5–5.3)
POTASSIUM SERPL-SCNC: 3.4 MMOL/L — LOW (ref 3.5–5.3)
RBC # BLD: 4.27 M/UL — SIGNIFICANT CHANGE UP (ref 4.2–5.8)
RBC # FLD: 15.8 % — HIGH (ref 10.3–14.5)
SODIUM SERPL-SCNC: 141 MMOL/L — SIGNIFICANT CHANGE UP (ref 135–145)
WBC # BLD: 9.61 K/UL — SIGNIFICANT CHANGE UP (ref 3.8–10.5)
WBC # FLD AUTO: 9.61 K/UL — SIGNIFICANT CHANGE UP (ref 3.8–10.5)

## 2021-05-16 PROCEDURE — 99232 SBSQ HOSP IP/OBS MODERATE 35: CPT

## 2021-05-16 PROCEDURE — 99253 IP/OBS CNSLTJ NEW/EST LOW 45: CPT

## 2021-05-16 RX ORDER — FUROSEMIDE 40 MG
40 TABLET ORAL
Refills: 0 | Status: DISCONTINUED | OUTPATIENT
Start: 2021-05-16 | End: 2021-05-18

## 2021-05-16 RX ORDER — POTASSIUM CHLORIDE 20 MEQ
20 PACKET (EA) ORAL
Refills: 0 | Status: DISCONTINUED | OUTPATIENT
Start: 2021-05-16 | End: 2021-05-18

## 2021-05-16 RX ORDER — SPIRONOLACTONE 25 MG/1
25 TABLET, FILM COATED ORAL DAILY
Refills: 0 | Status: DISCONTINUED | OUTPATIENT
Start: 2021-05-16 | End: 2021-05-18

## 2021-05-16 RX ADMIN — Medication 300 MILLIGRAM(S): at 10:24

## 2021-05-16 RX ADMIN — Medication 20 MILLIEQUIVALENT(S): at 10:25

## 2021-05-16 RX ADMIN — Medication 20 MILLIEQUIVALENT(S): at 18:47

## 2021-05-16 RX ADMIN — Medication 40 MILLIGRAM(S): at 18:47

## 2021-05-16 RX ADMIN — SPIRONOLACTONE 25 MILLIGRAM(S): 25 TABLET, FILM COATED ORAL at 10:27

## 2021-05-16 RX ADMIN — LISINOPRIL 5 MILLIGRAM(S): 2.5 TABLET ORAL at 10:24

## 2021-05-16 RX ADMIN — APIXABAN 5 MILLIGRAM(S): 2.5 TABLET, FILM COATED ORAL at 10:25

## 2021-05-16 RX ADMIN — APIXABAN 5 MILLIGRAM(S): 2.5 TABLET, FILM COATED ORAL at 22:02

## 2021-05-16 RX ADMIN — Medication 40 MILLIGRAM(S): at 10:24

## 2021-05-16 NOTE — CONSULT NOTE ADULT - SUBJECTIVE AND OBJECTIVE BOX
Patient is a 60y old  Male who presents with a chief complaint of Atrial fib RVR  CHF exac  SOB  Poor medication compliance (15 May 2021 09:10)  2021- Cardiology Consultation: Patient requested Dr. Simpson to assume his cardiology care. Chart reviewed, history reviewed and patient examined. 60 year old man with prior history of heart failure and atrial  fibrillation initially presenting in 2017 to [facility]. Evaluation by TTE showed grossly normal LV EF in the contest of a limited study but nuclear scanning showed and EF of 38% and no ischemia. He underwent electrical cardioversion successfully but AF recurred and he was treated with sotalol followed by repeat electrical cardioversion that was successful. As an outpatient he remained stable on a regimen including lisinopril 5 mg qd, spironolactione 25 mg qd, furosemide 40 mg bid, Kcl 20 meq bid, sotalol 120 mg bid and Xarelto 20 mg qd. He was then lost to followup and states he stopped all his medication around 2019. He remained asymptomatic until about 2 weeks prior to admission when he noted gradual increase in body weight of 14 pounds, progressive dyspnea on exertion, abdominal bloating  and mild leg edema  HPI:  Pt is pleasant 59 y/o male with PMHx of Obesity, Dilated Cardiomyopathy with EF 38% () , Systolic CHF,  Afib s/p prior WENDI cardioversion (), possible OMAYRA,  COVID in 2020 who was sent to the  ED by [facility]  for worsening SOB x 2 days and GREY.  Pt reports he has noted increasing SOB for the last 8 days and  feeling bloated recently, gaining 14 pounds in the past 2 weeks.   Pt reports he stopped taking his meds and anticoagulation since early  at which time he states he was in NSR.  Since then  he had been trying to lose weight on his own and take care of himself without meds.  He recently had dental extractions due to gum disease and received a course of Amoxicillin.   In the ED pt was found to be in AF RVR and  CHF    Prior meds  in 2017 :  Rx: Xarelto, Spironolactone, Lisinopril 5 Q24, Lopressor 25 QID, K-Dur, lasix, sotalol 120mg BID       Pt was found to be in rapid Afib to 130s in intake and was sent to main ED (14 May 2021 21:09)      PAST MEDICAL & SURGICAL HISTORY:  Obesity    Afib    History of tonsillectomy        Allergies and Intolerances:        Allergies:  	No Known Allergies:     MEDICATIONS  (STANDING):  apixaban 5 milliGRAM(s) Oral every 12 hours  aspirin enteric coated 81 milliGRAM(s) Oral daily  atorvastatin 20 milliGRAM(s) Oral at bedtime  diltiazem    milliGRAM(s) Oral daily  furosemide   Injectable 40 milliGRAM(s) IV Push two times a day  lisinopril 5 milliGRAM(s) Oral daily    MEDICATIONS  (PRN):      FAMILY HISTORY:  FH: CAD (coronary artery disease) (Father, Mother)    Social History (marital status, living situation, occupation, tobacco use, alcohol and drug use, and sexual history): 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  Pt denied  tobacco, ETOH or drug use.  He reports ETOH use remotely.  Pt lives with his wife and son.      SOCIAL HISTORY:  Tobacco-           Alcohol-              Illicit drugs-              Occupation-              Marital  status-  REVIEW OF SYSTEM:  Pertinent items are noted in HPI.    Vital Signs Last 24 Hrs  T(C): 36.6 (15 May 2021 20:57), Max: 36.6 (15 May 2021 20:57)  T(F): 97.9 (15 May 2021 20:57), Max: 97.9 (15 May 2021 20:57)  HR: 106 (15 May 2021 20:57) (106 - 106)  BP: 120/70 (15 May 2021 20:57) (120/70 - 120/70)  BP(mean): 82 (15 May 2021 20:57) (82 - 82)  RR: 20 (15 May 2021 20:57) (20 - 20)  SpO2: 95% (15 May 2021 20:57) (95% - 95%)    I&O's Summary    15 May 2021 07:01  -  16 May 2021 07:00  --------------------------------------------------------  IN: 0 mL / OUT: 3695 mL / NET: -3695 mL      PHYSICAL EXAM  General Appearance:   HEENT: PERRL, conjunctiva clear, EOM's intact, non injected pharynx, no exudate, TM   normal  Neck: Supple, , no adenopathy, thyroid: not enlarged, no carotid bruit or JVD  Back: Symmetric, no  tenderness,no soft tissue tenderness  Lungs: Clear to auscultation bilaterally,no adventitious breath sounds, normal   expiratory phase  Heart: , S1, S2 normal, no murmur, rub or gallop  Abdomen: Soft, non-tender, bowel sounds active , no hepatosplenomegaly  Extremities: trace peripheral edema  Skin: Skin color, texture normal, no rashes   Neurologic: Alert and oriented X3 , cranial nerves intact, sensory and motor normal,        INTERPRETATION OF TELEMETRY: Atrial fib in the 80s    ECG: admission EKG-atrial fibrillation, rapid  BPM, isolated VPCs, nonspecific T abnormality    TTE reviewed: Mild LVE with mild global hypokinesis with EF about 40%, moderate LAE, mild aortic root dilatation, mild PAH with RVSP 43 mm Hg.    LABS:                          12.0   10.26 )-----------( 261      ( 15 May 2021 08:49 )             38.4     05-15    143  |  107  |  14  ----------------------------<  124<H>  3.2<L>   |  28  |  0.70    Ca    8.6      15 May 2021 08:49  Mg     2.0         TPro  7.2  /  Alb  3.2<L>  /  TBili  0.8  /  DBili  x   /  AST  19  /  ALT  40  /  AlkPhos  61  0514    CARDIAC MARKERS ( 15 May 2021 08:49 )  <0.015 ng/mL / x     / x     / x     / x      CARDIAC MARKERS ( 14 May 2021 13:48 )  <0.015 ng/mL / x     / x     / x     / x          Lipid Panel  134  33  --  70    Pro BNP  1603  @ 13:48  D Dimer  --  @ 13:48    PT/INR - ( 14 May 2021 16:39 )   PT: 14.5 sec;   INR: 1.26 ratio         PTT - ( 15 May 2021 08:49 )  PTT:90.0 sec  Urinalysis Basic - ( 14 May 2021 19:44 )    Color: Yellow / Appearance: Clear / S.010 / pH: x  Gluc: x / Ketone: Negative  / Bili: Negative / Urobili: Negative mg/dL   Blood: x / Protein: Negative mg/dL / Nitrite: Negative   Leuk Esterase: Negative / RBC: x / WBC x   Sq Epi: x / Non Sq Epi: x / Bacteria: x            RADIOLOGY & ADDITIONAL STUDIES:    IMPRESSION:  1.Mild dilated cardiomyopathy with EF about 40%. CHF symptoms have resolved with IV furosemide.  2. Paroxysmal atrial fibrillation. Rate control at rest is satisfactory. Restoration of sinus rhythm is preferred to improve cardiac output. Will consult EP regarding WENDI guided electrical cardioversion as an initial step. He may require antiarrhythmic therapy or ablation to maintain sinus rhythm.  3. Prediabetes.   4. Obesity.    PLAN:  Lexiscan MPI nuclear scan to rule out obstructive CAD. EP consult regarding AF management and WENDI cardioversion. Change furosemide to 40 mg bid oral. Add spironolactone  25 mg qd and KCL 20 meq bid. Continue Eliquis, diltiazem  and lisinopril. Stop ASA to reduce bleeding risk with Eliquis. Stop atorvastatin given normal LDL. Will follow. 
Pt is pleasant 59 y/o male with PMHx of Obesity, Dilated Cardiomyopathy with EF 38% (2017) , Systolic CHF,  Afib s/p prior WENDI cardioversion (), possible OMAYRA,  COVID in 2020 who was sent to the  ED by [facility]  for worsening SOB x 2 days and GREY.  Pt reports he has noted increasing SOB for the last 8 days and  feeling bloated recently, gaining 14 pounds in the past 2 weeks.   Pt reports he stopped taking his meds and anticoagulation since early  at which time he states he was in NSR.  Since then  he had been trying to lose weight on his own and take care of himself without meds.  He recently had dental extractions due to gum disease and received a course of Amoxicillin.   In the ED pt was found to be in AF RVR and  CHF  Prior meds  in 2017 :  Rx: Xarelto, Spironolactone, Lisinopril 5 Q24, Lopressor 25 QID, K-Dur, lasix, sotalol 120mg BID  Pt was found to be in rapid Afib to 130s in intake and was sent to main ED (14 May 2021 21:09)    5/15  feeling better today.  heart rates, on telemetry, show better controlled Afib.      PAST MEDICAL & SURGICAL HISTORY:  Obesity    Afib    History of tonsillectomy      MEDICATIONS  (STANDING):  aspirin enteric coated 81 milliGRAM(s) Oral daily  atorvastatin 20 milliGRAM(s) Oral at bedtime  diltiazem    Tablet 60 milliGRAM(s) Oral every 6 hours  furosemide   Injectable 40 milliGRAM(s) IV Push two times a day  heparin  Infusion.  Unit(s)/Hr (24 mL/Hr) IV Continuous <Continuous>  lisinopril 5 milliGRAM(s) Oral daily    MEDICATIONS  (PRN):    Allergies    No Known Allergies    Intolerances      FAMILY HISTORY:  FH: CAD (coronary artery disease) (Father, Mother)          REVIEW OF SYSTEMS:    CONSTITUTIONAL: No weakness, fevers or chills  EYES/ENT: No visual changes;  No vertigo or throat pain   NECK: No pain or stiffness  RESPIRATORY: No cough, wheezing, hemoptysis; No shortness of breath  CARDIOVASCULAR: No chest pain or palpitations  GASTROINTESTINAL: No abdominal or epigastric pain. No nausea, vomiting, or hematemesis; No diarrhea or constipation. No melena or hematochezia.  GENITOURINARY: No dysuria, frequency or hematuria  NEUROLOGICAL: No numbness or weakness  SKIN: No itching, burning, rashes, or lesions   All other review of systems is negative unless indicated above      PHYSICAL EXAM:  Daily Height in cm: 368.3 (14 May 2021 13:19)    Daily Weight in k.8 (15 May 2021 06:40)  Vital Signs Last 24 Hrs  T(C): 36.6 (14 May 2021 22:01), Max: 36.7 (14 May 2021 13:23)  T(F): 97.9 (14 May 2021 22:01), Max: 98.1 (14 May 2021 13:23)  HR: 58 (15 May 2021 08:21) (58 - 130)  BP: 131/91 (15 May 2021 08:21) (123/70 - 161/102)  BP(mean): 115 (14 May 2021 13:23) (115 - 115)  RR: 18 (15 May 2021 08:21) (18 - 20)  SpO2: 94% (15 May 2021 08:21) (92% - 95%)    Constitutional: NAD, awake and alert, well-developed  HEENT: PERR, EOMI, Normal Hearing, MMM  Neck: Soft and supple, No LAD, No JVD  Respiratory: Breath sounds are clear bilaterally, No wheezing, rales or rhonchi  Cardiovascular: S1 and S2, irregular rate and rhythm, no Murmurs, gallops or rubs  Gastrointestinal: Bowel Sounds present, soft, nontender, nondistended, no guarding, no rebound  Extremities: No peripheral edema  Vascular: 2+ peripheral pulses  Neurological: A/O x 3, no focal deficits  Musculoskeletal: 5/5 strength b/l upper and lower extremities  Skin: No rashes    LABS: All Labs Reviewed:                        12.4   14.33 )-----------( 272      ( 14 May 2021 23:52 )             40.0     05-14    140  |  108  |  15  ----------------------------<  115<H>  3.5   |  26  |  0.80    Ca    8.4<L>      14 May 2021 13:48  Mg     2.0     -14    TPro  7.2  /  Alb  3.2<L>  /  TBili  0.8  /  DBili  x   /  AST  19  /  ALT  40  /  AlkPhos  61  05-14    PT/INR - ( 14 May 2021 16:39 )   PT: 14.5 sec;   INR: 1.26 ratio         PTT - ( 14 May 2021 23:52 )  PTT:87.5 sec  CARDIAC MARKERS ( 14 May 2021 13:48 )  <0.015 ng/mL / x     / x     / x     / x          Blood Culture:     RADIOLOGY:   < from: Xray Chest 1 View AP/PA. (21 @ 14:13) >  EXAM:  XR CHEST 1 VIEW                            PROCEDURE DATE:  2021          INTERPRETATION:  Chest pain    A frontal chest film  demonstrates grossly clear lungs.  No acute infiltrate or consolidation is  noted. The costophrenic angles are grossly clear.    The heart is diffusely enlarged.    No pleural effusion suggested. .      IMPRESSION:  Cardiomegaly. Clear lungs.    < end of copied text >    EKG: rate controlled Afib    CARDIOLOGY TESTING:
                                                                      Patient is a 60y old  Male who presents with a chief complaint of Atrial fib RVR  CHF exac  SOB  Poor medication compliance (16 May 2021 10:37)      HPI:  Pt is pleasant 59 y/o male with PMHx of Obesity, Dilated Cardiomyopathy with EF 38% (2017) , Systolic CHF,  Afib s/p prior WENDI cardioversion (), possible OMAYRA,  COVID in 2020 who was sent to the  ED by [facility]  for worsening SOB x 2 days and GREY.  Pt reports he has noted increasing SOB for the last 8 days and  feeling bloated recently, gaining 14 pounds in the past 2 weeks.   Pt reports he stopped taking his meds and anticoagulation since early  at which time he states he was in NSR.  Since then  he had been trying to lose weight on his own and take care of himself without meds.  He recently had dental extractions due to gum disease and received a course of Amoxicillin.   In the ED pt was found to be in AF RVR and  CHF    Prior meds  in 2017 :  Rx: Xarelto, Spironolactone, Lisinopril 5 Q24, Lopressor 25 QID, K-Dur, lasix, sotalol 120mg BID       Pt was found to be in rapid Afib to 130s in intake and was sent to main ED (14 May 2021 21:09)  21-EP asked to evaluate pt secondary to above HPI, AF with RVR, and dilated CM EF 38%.    PAST MEDICAL & SURGICAL HISTORY:  Obesity    Afib    History of tonsillectomy        PREVIOUS DIAGNOSTIC TESTING:      ECHO  FINDINGS:  Type of Study:     TTE procedure: ECHOTTE WO CON COMP W DOP     BP: 131/91 mmHg     Study Location: 3NTechnical Quality: Technically Difficullt    Indications   1) I42.8 - Other cardiomyopathies   2) I48.91 - Unspecified artial fibrillation    M-Mode Measurements (cm)     LVEDd: 6.1 cm            LVESd: 4.71 cm   IVSEd: 0.96 cm   LVPWd: 1.05 cm              AO Root Dimension: 4 cm                               ACS: 2.5 cm                               LA: 5.1 cm    Doppler Measurements:     AV Velocity:106 cm/s               MV Peak E-Wave: 108 cm/s   AV Peak Gradient: 4.49 mmHg                                      MV Peak Gradient: 4.67 mmHg   TR Velocity:263 cm/s   TR Gradient:27.6676 mmHg   Estimated RAP:15 mmHg   RVSP:43 mmHg     Findings     Mitral Valve   Fibrocalcific changes noted to the mitral valve leaflets with preserved   leaflet excursion.   Regurgitant jet not well seen; probably mild to moderate mitral   regurgitation.     Aortic Valve   Normal aortic valve structure and function.   The aortic root is mildlydilated (4.0 cm).     Tricuspid Valve   The tricuspid valve leaflets are thin and pliable; valve motion is normal.   Mild tricuspid valve regurgitation.   Mild pulmonary hypertension.     Pulmonic Valve   Pulmonic valve not well seen.     Left Atrium   The left atrium is mildly dilated by LA volume index.     Left Ventricle   Endocardium is not well visualized; grossly the left ventricle appears   enlarged with mild systolic dysfunction. Visual estimation of left   ventricle ejection fraction is 45%.     Right Atrium   The right atrium appears mildly dilated.     Right Ventricle   The right ventricle appears mildly dilated.     Pericardial Effusion   No evidence of pericardial effusion.     Pleural Effusion   No evidence of pleural effusion.     Miscellaneous   The IVC is dilated with decreased respiratory variation, suggesting   elevated right atrial pressure.     Impression     Summary     Endocardium is not well visualized; grossly the left ventricle appears   enlarged with mild systolic dysfunction. Visual estimation of left   ventricle ejection fraction is 45%.   The IVC is dilated with decreased respiratory variation, suggesting   elevated right atrial pressure.   Mild to moderate mitral regurgitation.   Mild tricuspid valve regurgitation.   Mild pulmonary hypertension.      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 No nausea, vomiting, or hematemesis; No diarrhea or constipation. No melena or hematochezia.  GENITOURINARY: No dysuria, frequency, hematuria, or incontinence  NEUROLOGICAL: No headaches, memory loss, loss of strength, numbness, or tremors  SKIN: No itching, burning, rashes, or lesions   LYMPH Nodes: No enlarged glands  ENDOCRINE: No heat or cold intolerance; No hair loss  MUSCULOSKELETAL: No joint pain or swelling; No muscle, back, or extremity pain  PSYCHIATRIC: No depression, anxiety, mood swings, or difficulty sleeping  HEME/LYMPH: No easy bruising, or bleeding gums  ALLERY AND IMMUNOLOGIC: No hives or eczema	    Vital Signs Last 24 Hrs  T(C): 36.8 (16 May 2021 09:40), Max: 36.8 (16 May 2021 09:40)  T(F): 98.2 (16 May 2021 09:40), Max: 98.2 (16 May 2021 09:40)  HR: 91 (16 May 2021 09:40) (91 - 106)  BP: 125/73 (16 May 2021 09:40) (120/70 - 125/73)  BP(mean): 82 (15 May 2021 20:57) (82 - 82)  RR: 18 (16 May 2021 09:40) (18 - 20)  SpO2: 96% (16 May 2021 09:40) (95% - 96%)    Daily     Daily Weight in k.4 (16 May 2021 10:55)    I&O's Detail    15 May 2021 07:01  -  16 May 2021 07:00  --------------------------------------------------------  IN:  Total IN: 0 mL    OUT:    Voided (mL): 3695 mL  Total OUT: 3695 mL    Total NET: -3695 mL          PHYSICAL EXAM:  Constitutional: A & O x 3  HEENT:   Normal oral mucosa, PERRL, EOMI	  Cardiovascular: Normal S1 S2, No JVD, No murmurs, No edema  Respiratory: Lungs clear to auscultation	  Gastrointestinal:  Soft, Non-tender, + BS	  Skin: No rashes, No ecchymoses, No cyanosis  Neurologic: Non-focal  Extremities: Normal range of motion, No clubbing, cyanosis or edema  Vascular: Peripheral pulses palpable 2+ bilaterally      INTERPRETATION OF TELEMETRY: AF with HR 80-90s     ECG: AF RVR from  130s    LABS:                        12.3   9.61  )-----------( 297      ( 16 May 2021 08:51 )             39.5     05-16    141  |  106  |  14  ----------------------------<  116<H>  3.4<L>   |  29  |  0.82    Ca    8.2<L>      16 May 2021 08:51  Mg     2.1           CARDIAC MARKERS ( 15 May 2021 08:49 )  <0.015 ng/mL / x     / x     / x     / x          PT/INR - ( 14 May 2021 16:39 )   PT: 14.5 sec;   INR: 1.26 ratio         PTT - ( 15 May 2021 08:49 )  PTT:90.0 sec  Urinalysis Basic - ( 14 May 2021 19:44 )    Color: Yellow / Appearance: Clear / S.010 / pH: x  Gluc: x / Ketone: Negative  / Bili: Negative / Urobili: Negative mg/dL   Blood: x / Protein: Negative mg/dL / Nitrite: Negative   Leuk Esterase: Negative / RBC: x / WBC x   Sq Epi: x / Non Sq Epi: x / Bacteria: x      I&O's Summary    15 May 2021 07:01  -  16 May 2021 07:00  --------------------------------------------------------  IN: 0 mL / OUT: 3695 mL / NET: -3695 mL      BNP    RADIOLOGY & ADDITIONAL STUDIES:

## 2021-05-16 NOTE — CONSULT NOTE ADULT - ASSESSMENT
A 61 y/o male with PMHx of Obesity, Dilated Cardiomyopathy with EF 38% (2017) , Systolic CHF,  Afib s/p prior WENDI cardioversion (2017), possible OMAYRA,  COVID in December 2020 who was sent to the  ED by [facility]  for worsening SOB x 2 days and GREY.  Pt reports he has noted increasing SOB for the last 8 days and  feeling bloated recently, gaining 14 pounds in the past 2 weeks.       1) Continue tele monitoring  2) NPO after midnight for NST in am  3) Consider WENDI/ DCCV pending stress test results  4) Discuss AF RFCA in future as outpt once optimial GDMT for Systolic HF in place ( pt resumes taking and adhering to his medications)  5) continue eliquis of AF stroke prevention  6) continue cardizem as ordered  7) Rhythm controlling strategies/ options to be discussed with EP attending and NST results
Pt is pleasant 59 y/o male with PMHx of Obesity, Dilated Cardiomyopathy with EF 38% (2017) , Systolic CHF,  Afib s/p prior WENDI cardioversion (2017), possible OMAYRA,  COVID in December 2020 who was sent to the  ED by [facility]  for worsening SOB x 2 days and GREY, rapid Afib.    5/15  Echocardiogram today  continue diuresis with IV Lasix and monitor weight and kidney function  switch to diltiazem 360 mg daily. monitor the heart rates.   Eliquis 5 mg bid  Echocardiogram. medication adjustment depending on the echo results.

## 2021-05-16 NOTE — PROGRESS NOTE ADULT - SUBJECTIVE AND OBJECTIVE BOX
CC: Atrial fib RVR  CHF exac  SOB  Poor medication compliance     HPI:  Pt is pleasant 61 y/o male with PMHx of Obesity, Dilated Cardiomyopathy with EF 38% (2017) , Systolic CHF,  Afib s/p prior WENDI cardioversion (2017), possible OMAYRA,  COVID in 2020 who was sent to the  ED by [facility]  for worsening SOB x 2 days and GREY.  Pt reports he has noted increasing SOB for the last 8 days and  feeling bloated recently, gaining 14 pounds in the past 2 weeks.   Pt reports he stopped taking his meds and anticoagulation since early  at which time he states he was in NSR.  Since then  he had been trying to lose weight on his own and take care of himself without meds.  He recently had dental extractions due to gum disease and received a course of Amoxicillin.   In the ED pt was found to be in AF RVR and  CHF    Prior meds  in 2017 :  Rx: Xarelto, Spironolactone, Lisinopril 5 Q24, Lopressor 25 QID, K-Dur, lasix, sotalol 120mg BID       Pt was found to be in rapid Afib to 130s in intake and was sent to main ED (14 May 2021 21:09)    INTERVAL HPI/ OVERNIGHT EVENTS:  Pt was seen and examined, reports no SOB, feet are less swollen. No CP. POC discussed, will have stress test in am         Vital Signs Last 24 Hrs  T(C): 36.8 (16 May 2021 09:40), Max: 36.8 (16 May 2021 09:40)  T(F): 98.2 (16 May 2021 09:40), Max: 98.2 (16 May 2021 09:40)  HR: 91 (16 May 2021 09:40) (91 - 106)  BP: 125/73 (16 May 2021 09:40) (120/70 - 125/73)  BP(mean): 82 (15 May 2021 20:57) (82 - 82)  RR: 18 (16 May 2021 09:40) (18 - 20)  SpO2: 96% (16 May 2021 09:40) (95% - 96%)        REVIEW OF SYSTEMS:  All other review of systems is negative unless indicated above.      PHYSICAL EXAM:  General: Well developed; obese,  in no acute distress  Eyes: PERRLA, EOMI; conjunctiva and sclera clear  Head: Normocephalic; atraumatic  ENMT: No nasal discharge; airway clear  Neck: Supple; non tender; no masses  Respiratory: Decreased BS,  No wheezes, rales or rhonchi  Cardiovascular: Irregular rate and rhythm. S1 and S2 Normal;   Gastrointestinal: Soft non-tender non-distended; Normal bowel sounds  Genitourinary: No  suprapubic  tenderness  Extremities: +2 edema  Vascular: Peripheral pulses palpable 2+ bilaterally  Neurological: Alert and oriented x3, non focal   Musculoskeletal: Normal muscle tone and strength   Psychiatric: Cooperative       LABS:                         12.3   9.61  )-----------( 297      ( 16 May 2021 08:51 )             39.5     05-16    141  |  106  |  14  ----------------------------<  116<H>  3.4<L>   |  29  |  0.82    Ca    8.2<L>      16 May 2021 08:51  Mg     2.1     -    TPro  7.2  /  Alb  3.2<L>  /  TBili  0.8  /  DBili  x   /  AST  19  /  ALT  40  /  AlkPhos  61  05-14    CARDIAC MARKERS ( 15 May 2021 08:49 )  <0.015 ng/mL / x     / x     / x     / x      CARDIAC MARKERS ( 14 May 2021 13:48 )  <0.015 ng/mL / x     / x     / x     / x          LIVER FUNCTIONS - ( 14 May 2021 13:48 )  Alb: 3.2 g/dL / Pro: 7.2 gm/dL / ALK PHOS: 61 U/L / ALT: 40 U/L / AST: 19 U/L / GGT: x           PT/INR - ( 14 May 2021 16:39 )   PT: 14.5 sec;   INR: 1.26 ratio         PTT - ( 15 May 2021 08:49 )  PTT:90.0 sec  Urinalysis Basic - ( 14 May 2021 19:44 )    Color: Yellow / Appearance: Clear / S.010 / pH: x  Gluc: x / Ketone: Negative  / Bili: Negative / Urobili: Negative mg/dL   Blood: x / Protein: Negative mg/dL / Nitrite: Negative   Leuk Esterase: Negative / RBC: x / WBC x   Sq Epi: x / Non Sq Epi: x / Bacteria: x          CARDIAC MARKERS ( 14 May 2021 13:48 )  <0.015 ng/mL / x     / x     / x     / x                                12.4   14.33 )-----------( 272      ( 14 May 2021 23:52 )             40.0     14 May 2021 13:48    140    |  108    |  15     ----------------------------<  115    3.5     |  26     |  0.80     Ca    8.4        14 May 2021 13:48  Mg     2.0       14 May 2021 13:48    TPro  7.2    /  Alb  3.2    /  TBili  0.8    /  DBili  x      /  AST  19     /  ALT  40     /  AlkPhos  61     14 May 2021 13:48  PT/INR - ( 14 May 2021 16:39 )   PT: 14.5 sec;   INR: 1.26 ratio    PTT - ( 14 May 2021 23:52 )  PTT:87.5 sec          LIVER FUNCTIONS - ( 14 May 2021 13:48 )  Alb: 3.2 g/dL / Pro: 7.2 gm/dL / ALK PHOS: 61 U/L / ALT: 40 U/L / AST: 19 U/L / GGT: x           Urinalysis Basic - ( 14 May 2021 19:44 )    Color: Yellow / Appearance: Clear / S.010 / pH: x  Gluc: x / Ketone: Negative  / Bili: Negative / Urobili: Negative mg/dL   Blood: x / Protein: Negative mg/dL / Nitrite: Negative   Leuk Esterase: Negative / RBC: x / WBC x   Sq Epi: x / Non Sq Epi: x / Bacteria: x      MEDICATIONS  (STANDING):  apixaban 5 milliGRAM(s) Oral every 12 hours  diltiazem    milliGRAM(s) Oral daily  furosemide    Tablet 40 milliGRAM(s) Oral two times a day  lisinopril 5 milliGRAM(s) Oral daily  potassium chloride    Tablet ER 20 milliEquivalent(s) Oral two times a day  spironolactone 25 milliGRAM(s) Oral daily    MEDICATIONS  (PRN):        RADIOLOGY & ADDITIONAL TESTS:        EXAM:  XR CHEST 1 VIEW                        PROCEDURE DATE:  2021    IMPRESSION:  Cardiomegaly. Clear lungs.

## 2021-05-16 NOTE — PROGRESS NOTE ADULT - ASSESSMENT
61 y/o male with PMHx of Obesity, Dilated Cardiomyopathy with EF 38% (2017) , Systolic CHF,  Afib s/p prior WENDI cardioversion (2017), possible OMAYRA,  COVID in December 2020 admitted for:       Acute on Chronic Systolic CHF   likely triggered by AFIB with RVR and non compliance   Trop neg doubt ACS, but cant r/o underline Ischemic heart Dz  Monitor weight, is and os  Switched to IPO  lasix 40mg BID, spironolactone added  C/w low dose Lisinopril  ECHO: mild systolic dysFx, EF 45%, mild pHTN   Stress test in Am  Cardio f/u appreciated         Paroxysmal AFIB with RVR   - c/w  telemetry: AFIB rate better   - c/w  Cardizem CD 300mg   - C/w Eliquis   - EP eval for further management         Obesity. Prediabetic   Pt states that on diet and exercising  and lost significant amount of weight  will continue   Low carb diet         DVT PPX: eliquis     Dispo; c/w tele,m NPO after midnight, Stress in am, EP eval

## 2021-05-16 NOTE — CONSULT NOTE ADULT - REASON FOR ADMISSION
Atrial fib RVR  CHF exac  SOB  Poor medication compliance

## 2021-05-17 LAB
ANION GAP SERPL CALC-SCNC: 4 MMOL/L — LOW (ref 5–17)
BUN SERPL-MCNC: 17 MG/DL — SIGNIFICANT CHANGE UP (ref 7–23)
CALCIUM SERPL-MCNC: 8.2 MG/DL — LOW (ref 8.5–10.1)
CHLORIDE SERPL-SCNC: 105 MMOL/L — SIGNIFICANT CHANGE UP (ref 96–108)
CO2 SERPL-SCNC: 30 MMOL/L — SIGNIFICANT CHANGE UP (ref 22–31)
CREAT SERPL-MCNC: 0.8 MG/DL — SIGNIFICANT CHANGE UP (ref 0.5–1.3)
GLUCOSE SERPL-MCNC: 102 MG/DL — HIGH (ref 70–99)
HCT VFR BLD CALC: 38 % — LOW (ref 39–50)
HGB BLD-MCNC: 11.6 G/DL — LOW (ref 13–17)
MAGNESIUM SERPL-MCNC: 2 MG/DL — SIGNIFICANT CHANGE UP (ref 1.6–2.6)
MCHC RBC-ENTMCNC: 28.7 PG — SIGNIFICANT CHANGE UP (ref 27–34)
MCHC RBC-ENTMCNC: 30.5 GM/DL — LOW (ref 32–36)
MCV RBC AUTO: 94.1 FL — SIGNIFICANT CHANGE UP (ref 80–100)
PLATELET # BLD AUTO: 259 K/UL — SIGNIFICANT CHANGE UP (ref 150–400)
POTASSIUM SERPL-MCNC: 3.3 MMOL/L — LOW (ref 3.5–5.3)
POTASSIUM SERPL-SCNC: 3.3 MMOL/L — LOW (ref 3.5–5.3)
RBC # BLD: 4.04 M/UL — LOW (ref 4.2–5.8)
RBC # FLD: 15.7 % — HIGH (ref 10.3–14.5)
SARS-COV-2 RNA SPEC QL NAA+PROBE: SIGNIFICANT CHANGE UP
SODIUM SERPL-SCNC: 139 MMOL/L — SIGNIFICANT CHANGE UP (ref 135–145)
WBC # BLD: 9.68 K/UL — SIGNIFICANT CHANGE UP (ref 3.8–10.5)
WBC # FLD AUTO: 9.68 K/UL — SIGNIFICANT CHANGE UP (ref 3.8–10.5)

## 2021-05-17 PROCEDURE — 99233 SBSQ HOSP IP/OBS HIGH 50: CPT

## 2021-05-17 PROCEDURE — 93010 ELECTROCARDIOGRAM REPORT: CPT

## 2021-05-17 RX ORDER — REGADENOSON 0.08 MG/ML
0.4 INJECTION, SOLUTION INTRAVENOUS ONCE
Refills: 0 | Status: COMPLETED | OUTPATIENT
Start: 2021-05-17 | End: 2021-05-17

## 2021-05-17 RX ADMIN — Medication 300 MILLIGRAM(S): at 12:43

## 2021-05-17 RX ADMIN — Medication 40 MILLIGRAM(S): at 12:43

## 2021-05-17 RX ADMIN — REGADENOSON 0.4 MILLIGRAM(S): 0.08 INJECTION, SOLUTION INTRAVENOUS at 10:22

## 2021-05-17 RX ADMIN — SPIRONOLACTONE 25 MILLIGRAM(S): 25 TABLET, FILM COATED ORAL at 12:43

## 2021-05-17 RX ADMIN — LISINOPRIL 5 MILLIGRAM(S): 2.5 TABLET ORAL at 12:44

## 2021-05-17 RX ADMIN — Medication 40 MILLIGRAM(S): at 18:47

## 2021-05-17 RX ADMIN — Medication 20 MILLIEQUIVALENT(S): at 18:50

## 2021-05-17 RX ADMIN — Medication 20 MILLIEQUIVALENT(S): at 12:43

## 2021-05-17 RX ADMIN — APIXABAN 5 MILLIGRAM(S): 2.5 TABLET, FILM COATED ORAL at 12:43

## 2021-05-17 RX ADMIN — APIXABAN 5 MILLIGRAM(S): 2.5 TABLET, FILM COATED ORAL at 21:28

## 2021-05-17 NOTE — PROGRESS NOTE ADULT - SUBJECTIVE AND OBJECTIVE BOX
HPI:  Pt is pleasant 59 y/o male with PMHx of Obesity, Dilated Cardiomyopathy with EF 38% (2017) , Systolic CHF,  Afib s/p prior WENDI cardioversion (2017), possible OMAYRA,  COVID in December 2020 who was sent to the  ED by [facility]  for worsening SOB x 2 days and GREY.  Pt reports he has noted increasing SOB for the last 8 days and  feeling bloated recently, gaining 14 pounds in the past 2 weeks.   Pt reports he stopped taking his meds and anticoagulation since early 2019 at which time he states he was in NSR.  Since then  he had been trying to lose weight on his own and take care of himself without meds.  He recently had dental extractions due to gum disease and received a course of Amoxicillin.   In the ED pt was found to be in AF RVR and  CHF    Prior meds  in 2017 :  Rx: Xarelto, Spironolactone, Lisinopril 5 Q24, Lopressor 25 QID, K-Dur, lasix, sotalol 120mg BID       Pt was found to be in rapid Afib to 130s in intake and was sent to main ED (14 May 2021 21:09)  5/16/21-EP asked to evaluate pt secondary to above HPI, AF with RVR, and dilated CM EF 38%.    5/17/2021:  Patient remains in Afib, rate controlled while at rest.  For nuclear stress test today.  Denies CP, palpitations, SOB, dizziness, lightheadedness.  TELE: Afib with HR up to 160 with activity    MEDICATIONS  (STANDING):  apixaban 5 milliGRAM(s) Oral every 12 hours  diltiazem    milliGRAM(s) Oral daily  furosemide    Tablet 40 milliGRAM(s) Oral two times a day  lisinopril 5 milliGRAM(s) Oral daily  potassium chloride    Tablet ER 20 milliEquivalent(s) Oral two times a day  spironolactone 25 milliGRAM(s) Oral daily    MEDICATIONS  (PRN):    ICU Vital Signs Last 24 Hrs  T(C): 36.4 (17 May 2021 08:18), Max: 36.6 (16 May 2021 16:25)  T(F): 97.6 (17 May 2021 08:18), Max: 97.8 (16 May 2021 16:25)  HR: 67 (17 May 2021 08:18) (67 - 104)  BP: 124/90 (17 May 2021 12:49) (124/66 - 149/95)  BP(mean): --  ABP: --  ABP(mean): --  RR: 18 (17 May 2021 08:18) (18 - 18)  SpO2: 96% (17 May 2021 08:18) (94% - 96%)          PHYSICAL EXAM:  Constitutional: A & O x 3  HEENT:   Normal oral mucosa, PERRL, EOMI	  Cardiovascular: Normal S1 S2, No JVD, No murmurs, No edema  Respiratory: Lungs clear to auscultation	  Gastrointestinal:  Soft, Non-tender, + BS	  Skin: No rashes, No ecchymoses, No cyanosis  Neurologic: Non-focal  Extremities: ROM, chronic stasis changes  Vascular: Peripheral pulses palpable 2+ bilaterally    05-17    139  |  105  |  17  ----------------------------<  102<H>  3.3<L>   |  30  |  0.80    Ca    8.2<L>      17 May 2021 07:16  Mg     2.0     05-17                          11.6   9.68  )-----------( 259      ( 17 May 2021 07:16 )             38.0     Cardiology:  < from: TTE Echo Complete w/o Contrast w/ Doppler (05.15.21 @ 18:18) >   Impression     Summary     Endocardium is not well visualized; grossly the left ventricle appears   enlarged with mild systolic dysfunction. Visual estimation of left   ventricle ejection fraction is 45%.   The IVC is dilated with decreased respiratory variation, suggesting   elevated right atrial pressure.   Mild to moderate mitral regurgitation.   Mild tricuspid valve regurgitation.   Mild pulmonary hypertension.            RADIOLOGY & ADDITIONAL STUDIES:  < from: Xray Chest 1 View AP/PA. (05.14.21 @ 14:13) >  NTERPRETATION:  Chest pain    A frontal chest film  demonstrates grossly clear lungs.  No acute infiltrate or consolidation is  noted. The costophrenic angles are grossly clear.    The heart is diffusely enlarged.    No pleural effusion suggested. .      IMPRESSION:  Cardiomegaly. Clear lungs.

## 2021-05-17 NOTE — PROGRESS NOTE ADULT - SUBJECTIVE AND OBJECTIVE BOX
Lexiscan stress test performed without complication.   Underlying rhythm is Afib.   Nuclear imaging report to follow

## 2021-05-17 NOTE — PROGRESS NOTE ADULT - SUBJECTIVE AND OBJECTIVE BOX
Patient is a 60y old  Male who presents with a chief complaint of Atrial fib RVR  CHF exac  SOB  Poor medication compliance (16 May 2021 14:59)  5/16/2021- Cardiology Consultation: Patient requested Dr. Simpson to assume his cardiology care. Chart reviewed, history reviewed and patient examined. 60 year old man with prior history of heart failure and atrial  fibrillation initially presenting in Sept 2017 to [facility]. Evaluation by TTE showed grossly normal LV EF in the contest of a limited study but nuclear scanning showed and EF of 38% and no ischemia. He underwent electrical cardioversion successfully but AF recurred and he was treated with sotalol followed by repeat electrical cardioversion that was successful. As an outpatient he remained stable on a regimen including lisinopril 5 mg qd, spironolactione 25 mg qd, furosemide 40 mg bid, Kcl 20 meq bid, sotalol 120 mg bid and Xarelto 20 mg qd. He was then lost to followup and states he stopped all his medication around March 2019. He remained asymptomatic until about 2 weeks prior to admission when he noted gradual increase in body weight of 14 pounds, progressive dyspnea on exertion, abdominal bloating  and mild leg edema    Followup HPI:  5/17- no complaints. Ambulated in the [place] on 5/16 without chest pain or shortness of breath.   PAST MEDICAL & SURGICAL HISTORY:  Obesity    Afib    History of tonsillectomy        Review of symptoms:  Negative except for as noted in today's HPI.      MEDICATIONS  (STANDING):  apixaban 5 milliGRAM(s) Oral every 12 hours  diltiazem    milliGRAM(s) Oral daily  furosemide    Tablet 40 milliGRAM(s) Oral two times a day  lisinopril 5 milliGRAM(s) Oral daily  potassium chloride    Tablet ER 20 milliEquivalent(s) Oral two times a day  spironolactone 25 milliGRAM(s) Oral daily    MEDICATIONS  (PRN):          Vital Signs Last 24 Hrs  T(C): 36.6 (16 May 2021 16:25), Max: 36.8 (16 May 2021 09:40)  T(F): 97.8 (16 May 2021 16:25), Max: 98.2 (16 May 2021 09:40)  HR: 104 (16 May 2021 19:04) (91 - 104)  BP: 124/66 (16 May 2021 16:25) (124/66 - 125/73)  BP(mean): --  RR: 18 (16 May 2021 16:25) (18 - 18)  SpO2: 94% (16 May 2021 16:25) (94% - 96%)    I&O's Summary    16 May 2021 07:01  -  17 May 2021 07:00  --------------------------------------------------------  IN: 0 mL / OUT: 600 mL / NET: -600 mL        PHYSICAL EXAM  General Appearance: comfortable  HEENT:   Neck:   Lungs: clear  Heart: no murmur or gallop  Abdomen:   Extremities: trace edema both LE  Neurologic:       INTERPRETATION OF TELEMETRY: AF VR mostly 80s.    ECG:       EXAM:  ECHO TTE WO CON COMP W DOPP    PROCEDURE DATE:  05/15/2021       Summary     Endocardium is not well visualized; grossly the left ventricle appears   enlarged with mild systolic dysfunction. Visual estimation of left   ventricle ejection fraction is 45%.   The IVC is dilated with decreased respiratory variation, suggesting   elevated right atrial pressure.   Mild to moderate mitral regurgitation.   Mild tricuspid valve regurgitation.   Mild pulmonary hypertension.     Signature     ----------------------------------------------------------------   Electronically signed by Alex Nichols MD(Interpreting   physician) on 05/16/2021 09:12 AM              LABS:                          12.3   9.61  )-----------( 297      ( 16 May 2021 08:51 )             39.5     05-16    141  |  106  |  14  ----------------------------<  116<H>  3.4<L>   |  29  |  0.82    Ca    8.2<L>      16 May 2021 08:51  Mg     2.1     05-16      CARDIAC MARKERS ( 15 May 2021 08:49 )  <0.015 ng/mL / x     / x     / x     / x          Lipid Panel  134  33  --  70    Pro BNP  1603 05-14 @ 13:48  D Dimer  -- 05-14 @ 13:48    PTT - ( 15 May 2021 08:49 )  PTT:90.0 sec          RADIOLOGY & ADDITIONAL STUDIES:    IMPRESSION:  1.Mild dilated cardiomyopathy with EF about 40%. CHF symptoms have resolved with diuresis and rate control.  2. Paroxysmal atrial fibrillation. Rate control at rest is satisfactory. Restoration of sinus rhythm is preferred to improve cardiac output. EP consult note appreciated. EP to consider WENDI/cardioversion depending upon the stress test results. He may require antiarrhythmic therapy or ablation to maintain sinus rhythm.  3. Prediabetes.   4. Obesity.  PLAN:  Lexiscan MPI scan today. Possible WENDI/electrical cardioversion 5/18. Continue furosemide, KCl, spironolactone, diltiazem, Eliquis. aerobic capacity/endurance/gait, locomotion, and balance

## 2021-05-17 NOTE — PROGRESS NOTE ADULT - ASSESSMENT
59 y/o male with PMHx of Obesity, Dilated Cardiomyopathy with EF 38% (2017) , Systolic CHF,  Afib s/p prior WENDI cardioversion (2017), possible OMAYRA,  COVID in December 2020 admitted for:       Acute on Chronic Systolic CHF   likely triggered by AFIB with RVR and non compliance   Trop neg doubt ACS, but cant r/o underline Ischemic heart Dz  Monitor weight, is and os  Switched to PO  lasix 40mg BID, spironolactone added  C/w low dose Lisinopril  ECHO: mild systolic dysFx, EF 45%, mild pHTN   Stress test  to be completed in am   Cardio f/u appreciated         Paroxysmal AFIB with RVR   - c/w  telemetry: AFIB rate better   - c/w  Cardizem CD 300mg   - C/w Eliquis   -D/w EP team, will need WENDI before any time of CV, rhythm control medications considered, will finalize with Pt   - NPO after midnight for WENDI/CV in am         Obesity. Prediabetic   Pt states that on diet and exercising  and lost significant amount of weight  will continue   Low carb diet         DVT PPX: eliquis     Dispo; c/w tele,  NPO after midnight, Stress and WENID/CV  in am

## 2021-05-17 NOTE — PROGRESS NOTE ADULT - ASSESSMENT
A 59 y/o male with PMHx of Obesity, Dilated Cardiomyopathy with EF 38% (2017) , Systolic CHF,  Afib s/p prior WENDI cardioversion (2017), possible OMAYRA,  COVID in December 2020 who was sent to the  ED by [facility]  for worsening SOB x 2 days and GREY.  Pt reports he has noted increasing SOB for the last 8 days and  feeling bloated recently, gaining 14 pounds in the past 2 weeks.       Continue telemonitoring  Follow up nuclear stress  Patient would benefit from restoration of sinus rhythm  Plan for WENDI/DCCV on 5/18  If negative WENDI will start Sotalol post cardioversion on 5/18  NPO after MN  Continue Diltiazem and Eliquis  GDMT for cardiomyopathy  Further management per medicine  Plan discussed with patient, hospitalist and Dr. Stevenson

## 2021-05-17 NOTE — PROGRESS NOTE ADULT - SUBJECTIVE AND OBJECTIVE BOX
CC: Atrial fib RVR  CHF exac  SOB  Poor medication compliance     HPI:  Pt is pleasant 59 y/o male with PMHx of Obesity, Dilated Cardiomyopathy with EF 38% (2017) , Systolic CHF,  Afib s/p prior WENDI cardioversion (2017), possible OMAYRA,  COVID in 2020 who was sent to the  ED [facility]  for worsening SOB x 2 days and GREY.  Pt reports he has noted increasing SOB for the last 8 days and  feeling bloated recently, gaining 14 pounds in the past 2 weeks.   Pt reports he stopped taking his meds and anticoagulation since early  at which time he states he was in NSR.  Since then  he had been trying to lose weight on his own and take care of himself without meds.  He recently had dental extractions due to gum disease and received a course of Amoxicillin.   In the ED pt was found to be in AF RVR and  CHF    Prior meds  in 2017 :  Rx: Xarelto, Spironolactone, Lisinopril 5 Q24, Lopressor 25 QID, K-Dur, lasix, sotalol 120mg BID       Pt was found to be in rapid Afib to 130s in intake and was sent to main ED (14 May 2021 21:09)    INTERVAL HPI/ OVERNIGHT EVENTS:  Pt was seen and examined, s/p stress test 1st part, will be completing test in am. reports no SOB. tele with elevated rates in am, improved later. Plan discussed,  awaiting for EO to finalize         Vital Signs Last 24 Hrs  T(C): 36.7 (17 May 2021 18:46), Max: 36.7 (17 May 2021 18:46)  T(F): 98.1 (17 May 2021 18:46), Max: 98.1 (17 May 2021 18:46)  HR: 94 (17 May 2021 18:46) (67 - 94)  BP: 131/78 (17 May 2021 18:46) (124/90 - 149/95)  RR: 18 (17 May 2021 18:46) (18 - 18)  SpO2: 96% (17 May 2021 08:18) (96% - 96%)        REVIEW OF SYSTEMS:  All other review of systems is negative unless indicated above.      PHYSICAL EXAM:  General: Well developed; obese,  in no acute distress  Eyes: PERRLA, EOMI; conjunctiva and sclera clear  Head: Normocephalic; atraumatic  ENMT: No nasal discharge; airway clear  Neck: Supple; non tender; no masses  Respiratory: Decreased BS,  No wheezes, rales or rhonchi  Cardiovascular: Irregular rate and rhythm. S1 and S2 Normal;   Gastrointestinal: Soft non-tender non-distended; Normal bowel sounds  Genitourinary: No  suprapubic  tenderness  Extremities: +2 edema  Vascular: Peripheral pulses palpable 2+ bilaterally  Neurological: Alert and oriented x3, non focal   Musculoskeletal: Normal muscle tone and strength   Psychiatric: Cooperative       LABS:                         11.6   9.68  )-----------( 259      ( 17 May 2021 07:16 )             38.0     05-17    139  |  105  |  17  ----------------------------<  102<H>  3.3<L>   |  30  |  0.80    Ca    8.2<L>      17 May 2021 07:16  Mg     2.0                               12.3   9.61  )-----------( 297      ( 16 May 2021 08:51 )             39.5     05-16    141  |  106  |  14  ----------------------------<  116<H>  3.4<L>   |  29  |  0.82    Ca    8.2<L>      16 May 2021 08:51  Mg     2.1     -    TPro  7.2  /  Alb  3.2<L>  /  TBili  0.8  /  DBili  x   /  AST  19  /  ALT  40  /  AlkPhos  61  05-14    CARDIAC MARKERS ( 15 May 2021 08:49 )  <0.015 ng/mL / x     / x     / x     / x      CARDIAC MARKERS ( 14 May 2021 13:48 )  <0.015 ng/mL / x     / x     / x     / x          LIVER FUNCTIONS - ( 14 May 2021 13:48 )  Alb: 3.2 g/dL / Pro: 7.2 gm/dL / ALK PHOS: 61 U/L / ALT: 40 U/L / AST: 19 U/L / GGT: x           PT/INR - ( 14 May 2021 16:39 )   PT: 14.5 sec;   INR: 1.26 ratio         PTT - ( 15 May 2021 08:49 )  PTT:90.0 sec  Urinalysis Basic - ( 14 May 2021 19:44 )    Color: Yellow / Appearance: Clear / S.010 / pH: x  Gluc: x / Ketone: Negative  / Bili: Negative / Urobili: Negative mg/dL   Blood: x / Protein: Negative mg/dL / Nitrite: Negative   Leuk Esterase: Negative / RBC: x / WBC x   Sq Epi: x / Non Sq Epi: x / Bacteria: x          CARDIAC MARKERS ( 14 May 2021 13:48 )  <0.015 ng/mL / x     / x     / x     / x                                12.4   14.33 )-----------( 272      ( 14 May 2021 23:52 )             40.0     14 May 2021 13:48    140    |  108    |  15     ----------------------------<  115    3.5     |  26     |  0.80     Ca    8.4        14 May 2021 13:48  Mg     2.0       14 May 2021 13:48    TPro  7.2    /  Alb  3.2    /  TBili  0.8    /  DBili  x      /  AST  19     /  ALT  40     /  AlkPhos  61     14 May 2021 13:48  PT/INR - ( 14 May 2021 16:39 )   PT: 14.5 sec;   INR: 1.26 ratio    PTT - ( 14 May 2021 23:52 )  PTT:87.5 sec          LIVER FUNCTIONS - ( 14 May 2021 13:48 )  Alb: 3.2 g/dL / Pro: 7.2 gm/dL / ALK PHOS: 61 U/L / ALT: 40 U/L / AST: 19 U/L / GGT: x           Urinalysis Basic - ( 14 May 2021 19:44 )    Color: Yellow / Appearance: Clear / S.010 / pH: x  Gluc: x / Ketone: Negative  / Bili: Negative / Urobili: Negative mg/dL   Blood: x / Protein: Negative mg/dL / Nitrite: Negative   Leuk Esterase: Negative / RBC: x / WBC x   Sq Epi: x / Non Sq Epi: x / Bacteria: x      MEDICATIONS  (STANDING):  apixaban 5 milliGRAM(s) Oral every 12 hours  diltiazem    milliGRAM(s) Oral daily  furosemide    Tablet 40 milliGRAM(s) Oral two times a day  lisinopril 5 milliGRAM(s) Oral daily  potassium chloride    Tablet ER 20 milliEquivalent(s) Oral two times a day  spironolactone 25 milliGRAM(s) Oral daily    MEDICATIONS  (PRN):        RADIOLOGY & ADDITIONAL TESTS:        EXAM:  XR CHEST 1 VIEW                        PROCEDURE DATE:  2021    IMPRESSION:  Cardiomegaly. Clear lungs.        EXAM:  ECHO TTE WO CON COMP W DOPP    PROCEDURE DATE:  05/15/2021         Summary     Endocardium is not well visualized; grossly the left ventricle appears   enlarged with mild systolic dysfunction. Visual estimation of left   ventricle ejection fraction is 45%.   The IVC is dilated with decreased respiratory variation, suggesting   elevated right atrial pressure.   Mild to moderate mitral regurgitation.   Mild tricuspid valve regurgitation.   Mild pulmonary hypertension.

## 2021-05-18 ENCOUNTER — TRANSCRIPTION ENCOUNTER (OUTPATIENT)
Age: 61
End: 2021-05-18

## 2021-05-18 VITALS
TEMPERATURE: 108 F | RESPIRATION RATE: 19 BRPM | OXYGEN SATURATION: 94 % | DIASTOLIC BLOOD PRESSURE: 70 MMHG | SYSTOLIC BLOOD PRESSURE: 122 MMHG

## 2021-05-18 LAB
ANION GAP SERPL CALC-SCNC: 6 MMOL/L — SIGNIFICANT CHANGE UP (ref 5–17)
BUN SERPL-MCNC: 15 MG/DL — SIGNIFICANT CHANGE UP (ref 7–23)
CALCIUM SERPL-MCNC: 8.8 MG/DL — SIGNIFICANT CHANGE UP (ref 8.5–10.1)
CHLORIDE SERPL-SCNC: 106 MMOL/L — SIGNIFICANT CHANGE UP (ref 96–108)
CO2 SERPL-SCNC: 28 MMOL/L — SIGNIFICANT CHANGE UP (ref 22–31)
CREAT SERPL-MCNC: 0.77 MG/DL — SIGNIFICANT CHANGE UP (ref 0.5–1.3)
GLUCOSE SERPL-MCNC: 102 MG/DL — HIGH (ref 70–99)
HCT VFR BLD CALC: 41.1 % — SIGNIFICANT CHANGE UP (ref 39–50)
HGB BLD-MCNC: 12.8 G/DL — LOW (ref 13–17)
MCHC RBC-ENTMCNC: 28.9 PG — SIGNIFICANT CHANGE UP (ref 27–34)
MCHC RBC-ENTMCNC: 31.1 GM/DL — LOW (ref 32–36)
MCV RBC AUTO: 92.8 FL — SIGNIFICANT CHANGE UP (ref 80–100)
PLATELET # BLD AUTO: 289 K/UL — SIGNIFICANT CHANGE UP (ref 150–400)
POTASSIUM SERPL-MCNC: 3.7 MMOL/L — SIGNIFICANT CHANGE UP (ref 3.5–5.3)
POTASSIUM SERPL-SCNC: 3.7 MMOL/L — SIGNIFICANT CHANGE UP (ref 3.5–5.3)
RBC # BLD: 4.43 M/UL — SIGNIFICANT CHANGE UP (ref 4.2–5.8)
RBC # FLD: 15.6 % — HIGH (ref 10.3–14.5)
SODIUM SERPL-SCNC: 140 MMOL/L — SIGNIFICANT CHANGE UP (ref 135–145)
WBC # BLD: 10.74 K/UL — HIGH (ref 3.8–10.5)
WBC # FLD AUTO: 10.74 K/UL — HIGH (ref 3.8–10.5)

## 2021-05-18 PROCEDURE — 99239 HOSP IP/OBS DSCHRG MGMT >30: CPT

## 2021-05-18 PROCEDURE — 93016 CV STRESS TEST SUPVJ ONLY: CPT

## 2021-05-18 PROCEDURE — 93010 ELECTROCARDIOGRAM REPORT: CPT

## 2021-05-18 PROCEDURE — 78452 HT MUSCLE IMAGE SPECT MULT: CPT | Mod: 26

## 2021-05-18 PROCEDURE — 93018 CV STRESS TEST I&R ONLY: CPT

## 2021-05-18 RX ORDER — LISINOPRIL 2.5 MG/1
1 TABLET ORAL
Qty: 30 | Refills: 0
Start: 2021-05-18 | End: 2021-06-16

## 2021-05-18 RX ORDER — DILTIAZEM HCL 120 MG
1 CAPSULE, EXT RELEASE 24 HR ORAL
Qty: 30 | Refills: 0
Start: 2021-05-18 | End: 2021-06-16

## 2021-05-18 RX ORDER — FUROSEMIDE 40 MG
1 TABLET ORAL
Refills: 0 | DISCHARGE
Start: 2021-05-18 | End: 2021-06-16

## 2021-05-18 RX ORDER — POTASSIUM CHLORIDE 20 MEQ
1 PACKET (EA) ORAL
Qty: 28 | Refills: 0
Start: 2021-05-18 | End: 2021-05-31

## 2021-05-18 RX ORDER — APIXABAN 2.5 MG/1
1 TABLET, FILM COATED ORAL
Qty: 60 | Refills: 0
Start: 2021-05-18 | End: 2021-06-16

## 2021-05-18 RX ORDER — METOPROLOL TARTRATE 50 MG
1 TABLET ORAL
Qty: 30 | Refills: 0
Start: 2021-05-18 | End: 2021-06-16

## 2021-05-18 RX ORDER — FUROSEMIDE 40 MG
1 TABLET ORAL
Qty: 0 | Refills: 0 | DISCHARGE
Start: 2021-05-18 | End: 2021-06-16

## 2021-05-18 RX ORDER — SPIRONOLACTONE 25 MG/1
1 TABLET, FILM COATED ORAL
Qty: 30 | Refills: 0
Start: 2021-05-18 | End: 2021-06-16

## 2021-05-18 RX ORDER — METOPROLOL TARTRATE 50 MG
50 TABLET ORAL DAILY
Refills: 0 | Status: DISCONTINUED | OUTPATIENT
Start: 2021-05-18 | End: 2021-05-18

## 2021-05-18 RX ORDER — FUROSEMIDE 40 MG
1 TABLET ORAL
Qty: 60 | Refills: 0
Start: 2021-05-18 | End: 2021-06-16

## 2021-05-18 RX ORDER — METOPROLOL TARTRATE 50 MG
25 TABLET ORAL EVERY 12 HOURS
Refills: 0 | Status: DISCONTINUED | OUTPATIENT
Start: 2021-05-18 | End: 2021-05-18

## 2021-05-18 RX ORDER — METOPROLOL SUCCINATE 50 MG/1
1 TABLET, EXTENDED RELEASE ORAL
Qty: 30 | Refills: 0 | DISCHARGE
Start: 2021-05-18 | End: 2021-06-16

## 2021-05-18 RX ADMIN — LISINOPRIL 5 MILLIGRAM(S): 2.5 TABLET ORAL at 11:57

## 2021-05-18 RX ADMIN — SPIRONOLACTONE 25 MILLIGRAM(S): 25 TABLET, FILM COATED ORAL at 11:56

## 2021-05-18 RX ADMIN — Medication 40 MILLIGRAM(S): at 18:33

## 2021-05-18 RX ADMIN — Medication 300 MILLIGRAM(S): at 11:56

## 2021-05-18 RX ADMIN — Medication 20 MILLIEQUIVALENT(S): at 18:33

## 2021-05-18 RX ADMIN — Medication 20 MILLIEQUIVALENT(S): at 05:55

## 2021-05-18 RX ADMIN — Medication 50 MILLIGRAM(S): at 16:23

## 2021-05-18 RX ADMIN — APIXABAN 5 MILLIGRAM(S): 2.5 TABLET, FILM COATED ORAL at 12:04

## 2021-05-18 RX ADMIN — Medication 40 MILLIGRAM(S): at 11:56

## 2021-05-18 NOTE — PROGRESS NOTE ADULT - SUBJECTIVE AND OBJECTIVE BOX
HPI:  Pt is pleasant 61 y/o male with PMHx of Obesity, Dilated Cardiomyopathy with EF 38% (2017) , Systolic CHF,  Afib s/p prior WENDI cardioversion (2017), possible OMAYRA,  COVID in December 2020 who was sent to the  ED by [facility]  for worsening SOB x 2 days and GREY.  Pt reports he has noted increasing SOB for the last 8 days and  feeling bloated recently, gaining 14 pounds in the past 2 weeks.   Pt reports he stopped taking his meds and anticoagulation since early 2019 at which time he states he was in NSR.  Since then  he had been trying to lose weight on his own and take care of himself without meds.  He recently had dental extractions due to gum disease and received a course of Amoxicillin.   In the ED pt was found to be in AF RVR and  CHF    Prior meds  in 2017 :  Rx: Xarelto, Spironolactone, Lisinopril 5 Q24, Lopressor 25 QID, K-Dur, lasix, sotalol 120mg BID       Pt was found to be in rapid Afib to 130s in intake and was sent to main ED (14 May 2021 21:09)  5/16/21-EP asked to evaluate pt secondary to above HPI, AF with RVR, and dilated CM EF 38%.    5/17/2021:  Patient remains in Afib, rate controlled while at rest.  For nuclear stress test today.  Denies CP, palpitations, SOB, dizziness, lightheadedness.    5/18/2021: Patient s/p WENDI which showed left atrial appendage thrombus.   TELE:  Afib 100-110with ambulation    MEDICATIONS  (STANDING):  apixaban 5 milliGRAM(s) Oral every 12 hours  diltiazem    milliGRAM(s) Oral daily  furosemide    Tablet 40 milliGRAM(s) Oral two times a day  lisinopril 5 milliGRAM(s) Oral daily  metoprolol succinate ER 50 milliGRAM(s) Oral daily  potassium chloride    Tablet ER 20 milliEquivalent(s) Oral two times a day  spironolactone 25 milliGRAM(s) Oral daily    MEDICATIONS  (PRN):    ICU Vital Signs Last 24 Hrs  T(C): 36.1 (18 May 2021 09:24), Max: 36.7 (17 May 2021 18:46)  T(F): 97 (18 May 2021 09:24), Max: 98.1 (17 May 2021 18:46)  HR: 124 (18 May 2021 10:09) (77 - 124)  BP: 136/84 (18 May 2021 12:05) (129/71 - 154/78)  BP(mean): --  ABP: --  ABP(mean): --  RR: 20 (18 May 2021 10:09) (14 - 21)  SpO2: 96% (18 May 2021 10:09) (95% - 98%)            PHYSICAL EXAM:  Constitutional: A & O x 3  HEENT:   Normal oral mucosa, PERRL, EOMI	  Cardiovascular: Normal S1 S2, No JVD, No murmurs, No edema  Respiratory: Lungs clear to auscultation	  Gastrointestinal:  Soft, Non-tender, + BS	  Skin: No rashes, No ecchymoses, No cyanosis  Neurologic: Non-focal  Extremities: ROM, chronic stasis changes  Vascular: Peripheral pulses palpable 2+ bilaterally    05-18    140  |  106  |  15  ----------------------------<  102<H>  3.7   |  28  |  0.77    Ca    8.8      18 May 2021 07:33  Mg     2.0     05-17                          12.8   10.74 )-----------( 289      ( 18 May 2021 07:33 )             41.1     Cardiology:  < from: TTE Echo Complete w/o Contrast w/ Doppler (05.15.21 @ 18:18) >   Impression     Summary     Endocardium is not well visualized; grossly the left ventricle appears   enlarged with mild systolic dysfunction. Visual estimation of left   ventricle ejection fraction is 45%.   The IVC is dilated with decreased respiratory variation, suggesting   elevated right atrial pressure.   Mild to moderate mitral regurgitation.   Mild tricuspid valve regurgitation.   Mild pulmonary hypertension.            RADIOLOGY & ADDITIONAL STUDIES:  < from: Xray Chest 1 View AP/PA. (05.14.21 @ 14:13) >  NTERPRETATION:  Chest pain    A frontal chest film  demonstrates grossly clear lungs.  No acute infiltrate or consolidation is  noted. The costophrenic angles are grossly clear.    The heart is diffusely enlarged.    No pleural effusion suggested. .      IMPRESSION:  Cardiomegaly. Clear lungs.

## 2021-05-18 NOTE — PROGRESS NOTE ADULT - SUBJECTIVE AND OBJECTIVE BOX
Patient is a 60y old  Male who presents with a chief complaint of Atrial fib RVR  CHF exac  SOB  Poor medication compliance (17 May 2021 14:23)      HPI:  Pt is pleasant 59 y/o male with PMHx of Obesity, Dilated Cardiomyopathy with EF 38% (2017) , Systolic CHF,  Afib s/p prior WENDI cardioversion (2017), possible OMAYRA,  COVID in December 2020 who was sent to the  ED by [facility]  for worsening SOB x 2 days and GREY.  Pt reports he has noted increasing SOB for the last 8 days and  feeling bloated recently, gaining 14 pounds in the past 2 weeks.   Pt reports he stopped taking his meds and anticoagulation since early 2019 at which time he states he was in NSR.  Since then  he had been trying to lose weight on his own and take care of himself without meds.  He recently had dental extractions due to gum disease and received a course of Amoxicillin.   In the ED pt was found to be in AF RVR and  CHF    Prior meds  in 2017 :  Rx: Xarelto, Spironolactone, Lisinopril 5 Q24, Lopressor 25 QID, K-Dur, lasix, sotalol 120mg BID       Pt was found to be in rapid Afib to 130s in intake and was sent to main ED (14 May 2021 21:09)  5/16/2021- Cardiology Consultation: Patient requested Dr. Simpson to assume his cardiology care. Chart reviewed, history reviewed and patient examined. 60 year old man with prior history of heart failure and atrial  fibrillation initially presenting in Sept 2017 to [facility]. Evaluation by TTE showed grossly normal LV EF in the contest of a limited study but nuclear scanning showed and EF of 38% and no ischemia. He underwent electrical cardioversion successfully but AF recurred and he was treated with sotalol followed by repeat electrical cardioversion that was successful. As an outpatient he remained stable on a regimen including lisinopril 5 mg qd, spironolactione 25 mg qd, furosemide 40 mg bid, Kcl 20 meq bid, sotalol 120 mg bid and Xarelto 20 mg qd. He was then lost to followup and states he stopped all his medication around March 2019. He remained asymptomatic until about 2 weeks prior to admission when he noted gradual increase in body weight of 14 pounds, progressive dyspnea on exertion, abdominal bloating  and mild leg edema    Followup HPI:  5/17- no complaints. Ambulated in the [place]opez on 5/16 without chest pain or shortness of breath.   5/17 no complaints  PAST MEDICAL & SURGICAL HISTORY:  Obesity    Afib    History of tonsillectomy          MEDICATIONS  (STANDING):  apixaban 5 milliGRAM(s) Oral every 12 hours  diltiazem    milliGRAM(s) Oral daily  furosemide    Tablet 40 milliGRAM(s) Oral two times a day  lisinopril 5 milliGRAM(s) Oral daily  potassium chloride    Tablet ER 20 milliEquivalent(s) Oral two times a day  spironolactone 25 milliGRAM(s) Oral daily    MEDICATIONS  (PRN):          Vital Signs Last 24 Hrs  T(C): 36.3 (18 May 2021 05:27), Max: 36.7 (17 May 2021 18:46)  T(F): 97.3 (18 May 2021 05:27), Max: 98.1 (17 May 2021 18:46)  HR: 92 (18 May 2021 05:27) (67 - 94)  BP: 129/71 (18 May 2021 05:27) (124/90 - 149/95)  BP(mean): --  RR: 18 (18 May 2021 05:27) (18 - 18)  SpO2: 95% (18 May 2021 05:27) (95% - 97%)    I&O's Summary    17 May 2021 07:01  -  18 May 2021 07:00  --------------------------------------------------------  IN: 0 mL / OUT: 3400 mL / NET: -3400 mL        PHYSICAL EXAM  General Appearance: comfortable  HEENT:   Neck:   Lungs: clear  Heart: no murmur or gallop  Abdomen:   Extremities: trace edema both LE  Neurologic:         INTERPRETATION OF TELEMETRY:    ECG:        LABS:                          12.8   10.74 )-----------( 289      ( 18 May 2021 07:33 )             41.1     05-17    139  |  105  |  17  ----------------------------<  102<H>  3.3<L>   |  30  |  0.80    Ca    8.2<L>      17 May 2021 07:16  Mg     2.0     05-17          Lipid Panel  134  33  --  70    Pro BNP  1603 05-14 @ 13:48  D Dimer  -- 05-14 @ 13:48              RADIOLOGY & ADDITIONAL STUDIES:

## 2021-05-18 NOTE — PROCEDURAL SAFETY CHECKLIST WITH OR WITHOUT SEDATION - NSPOSTCOMMENTFT_GEN_ALL_CORE
Probe in at 0912. bubble study at  . Probe out at  . Patient tolerated procedure well. Probe in at 0912 . bubble study at 0914. Probe out at  . Patient tolerated procedure well. Probe in at 0912 . bubble study at 0914. Probe out at 0921.Patient tolerated procedure well.

## 2021-05-18 NOTE — DISCHARGE NOTE PROVIDER - CARE PROVIDER_API CALL
Von Simpson  CARDIOVASCULAR DISEASE  [address] [place] [address]  Phone: [phone]  Fax: [phone]  Follow Up Time: 1 week    Bryn Stevenson (MD)  Cardiac Electrophysiology; Cardiovascular Disease  [address] [place] [address]  Phone: [phone]  Fax: [phone]  Follow Up Time: 1 month

## 2021-05-18 NOTE — DISCHARGE NOTE PROVIDER - NSDCMRMEDTOKEN_GEN_ALL_CORE_FT
apixaban 5 mg oral tablet: 1 tab(s) orally every 12 hours  dilTIAZem 300 mg/24 hours oral capsule, extended release: 1 cap(s) orally once a day  furosemide 40 mg oral tablet: 1 tab(s) orally 2 times a day  lisinopril 5 mg oral tablet: 1 tab(s) orally once a day  metoprolol succinate 50 mg oral tablet, extended release: 1 tab(s) orally once a day  potassium chloride 20 mEq oral tablet, extended release: 1 tab(s) orally 2 times a day  spironolactone 25 mg oral tablet: 1 tab(s) orally once a day

## 2021-05-18 NOTE — DISCHARGE NOTE PROVIDER - HOSPITAL COURSE
61 y/o male with PMHx of Obesity, Dilated Cardiomyopathy with EF 40% (2017) , Systolic CHF,  Afib s/p prior WENDI cardioversion (2017), possible OMAYRA,  COVID in December 2020 who was sent to the  ED by [facility]  for worsening SOB x 2 days and GREY.  Pt reports he has noted increasing SOB for the last 8 days and  feeling bloated recently, gaining 14 pounds in the past 2 weeks.   Pt reports he stopped taking his meds and anticoagulation since early 2019 at which time he states he was in NSR.  Since then  he had been trying to lose weight on his own and take care of himself without meds.  He recently had dental extractions due to gum disease and received a course of Amoxicillin.   Prior meds  in 2017 :  Rx: Xarelto, Spironolactone, Lisinopril 5 Q24, Lopressor 25 QID, K-Dur, lasix, sotalol 120mg BID   In ED Pt was found to be in rapid Afib to 130s  also acute CHF.   Pt was  IV CArdizem , switched to Cardizem CD 300mg with improvement of HR. Pt was followed by CArdio and EP. Was planned for possible CV, but WENDI showed MAY thrombus. Pt is on Eliquis. EP and cardio recommend rate control with A/c x 6 weeks, will have repeat WENDI and then if cleared will be started on antiarrhythmics .   Today Pts  HR above 100 with ambulation, Toprol 50mg PO Qd added with improvement of HR. Pt Was also on IV lasix and then switched to PO lasix and Spironolactone. On Lisinopril. He had Stress test done and  showed hypokinesis with EF of 40%, stable from  last test in 2017. He found no reversible ischemia. Pt today reports no SOB, no CP edema improved, meds, utPt f/u discussed in details. Advised to postpone any invasive dental procedures and would not recommend to interrupt A/c. Pt understands       Vital Signs Last 24 Hrs  T(C): 42.2 (18 May 2021 17:36), Max: 42.2 (18 May 2021 17:36)  T(F): 108 (18 May 2021 17:36), Max: 108 (18 May 2021 17:36)  HR: 124 (18 May 2021 10:09) (77 - 124)  BP: 122/70 (18 May 2021 17:36) (122/70 - 154/78)  RR: 19 (18 May 2021 17:36) (14 - 21)  SpO2: 94% (18 May 2021 17:36) (94% - 98%)      PHYSICAL EXAM:  General: Well developed; obese,  in no acute distress on RA   Eyes: PERRLA, EOMI; conjunctiva and sclera clear  Head: Normocephalic; atraumatic  ENMT: No nasal discharge; airway clear  Neck: Supple; non tender; no masses  Respiratory: Decreased BS,  No wheezes, rales or rhonchi  Cardiovascular: Irregular rate and rhythm. S1 and S2 Normal;   Gastrointestinal: Soft non-tender non-distended; Normal bowel sounds  Genitourinary: No  suprapubic  tenderness  Extremities: edema  improved   Vascular: Peripheral pulses palpable 2+ bilaterally  Neurological: Alert and oriented x3, non focal   Musculoskeletal: Normal muscle tone and strength   Psychiatric: Cooperative     61 y/o male with PMHx of Obesity, Dilated Cardiomyopathy with EF 38% (2017) , Systolic CHF,  Afib s/p prior WENDI cardioversion (2017), possible OMAYRA,  COVID in December 2020 admitted for:       Acute on Chronic Systolic CHF   likely triggered by AFIB with RVR and non compliance   Trop neg doubt ACS,  stress test neg for reversible perfusion defect   Monitor weight, is and os  Switched to PO  lasix 40mg BID, spironolactone   C/w low dose Lisinopril  ECHO: mild systolic dysFx, EF 45%, mild pHTN   D/w Dr Jacobo, will follow outPt         Paroxysmal AFIB with RVR. MAY thrombus    - c/w  telemetry: AFIB rate improved, 80 at rest, 100 with ambulation   - c/w  Cardizem CD 300mg   - Started on Toprol this afternoon   - C/w Eliquis   -S/p WENDI : + MAY clot   -Pt to repeat WENDI in 6 weeks  - F/u with Dr Graham Engel. Prediabetic   Pt states that on diet and exercising  and lost significant amount of weight  will continue   Low carb diet         DVT PPX: eliquis     Dispo; stable to d/c home   Fax /c summary to PCP  Total time 50 min

## 2021-05-18 NOTE — PROGRESS NOTE ADULT - REASON FOR ADMISSION
Atrial fib RVR  CHF exac  SOB  Poor medication compliance

## 2021-05-18 NOTE — PROGRESS NOTE ADULT - PROVIDER SPECIALTY LIST ADULT
Cardiology
Electrophysiology
Hospitalist
Electrophysiology
Hospitalist
Hospitalist

## 2021-05-18 NOTE — DISCHARGE NOTE NURSING/CASE MANAGEMENT/SOCIAL WORK - PATIENT PORTAL LINK FT
You can access the FollowMyHealth Patient Portal offered by [facility] by registering at the following website: [email or web address]. By joining [facility]’s FollowMyHealth portal, you will also be able to view your health information using other applications (apps) compatible with our system.

## 2021-05-18 NOTE — PROGRESS NOTE ADULT - ASSESSMENT
A 61 y/o male with PMHx of Obesity, Dilated Cardiomyopathy with EF 38% (2017) , Systolic CHF,  Afib s/p prior WENDI cardioversion (2017), possible OMAYRA,  COVID in December 2020 who was sent to the  ED by [facility]  for worsening SOB x 2 days and GREY.  Pt reports he has noted increasing SOB for the last 8 days and  feeling bloated recently, gaining 14 pounds in the past 2 weeks.       Afib with RVR unable to undergo cardioversion due to left atrial appendage clot  Rate control  Continue Diltiazem 300mg daily  Add Toprol 50mg daily  Continue Eliquis uninterrupted for  Will plan for outpatient follow up in 4 weeks, office will call to schedule  In six weeks will plan for WENDI, admission for Sotalol load with cardioversion after 6 loading doses  Plan discussed with patient, inpatient team and Dr. Stevenson

## 2021-05-18 NOTE — DISCHARGE NOTE PROVIDER - NSDCCPCAREPLAN_GEN_ALL_CORE_FT
PRINCIPAL DISCHARGE DIAGNOSIS  Diagnosis: Atrial fibrillation with RVR  Assessment and Plan of Treatment: C/w eliquis toprol cardizem  F/u with EP      SECONDARY DISCHARGE DIAGNOSES  Diagnosis: CHF (congestive heart failure)  Assessment and Plan of Treatment: C/w lasix, spironolactone, toprol and lisinopril   Low salt diet   monitor weight   F/u with Dr Simpson next week    Diagnosis: Left atrial thrombus  Assessment and Plan of Treatment: C/w Misa  will need to repeat WENDI in 6 weeks  F/u with cardio

## 2021-05-18 NOTE — PACU DISCHARGE NOTE - COMMENTS
Report given to Amna ALCATNAR RN. Verified with Khloe Tanner, Ev[employer] tech that the patient is on cardiac monitor. All belongings are with the patient. Transferred back to  with transport staff at 1018

## 2021-05-18 NOTE — PHARMACOTHERAPY INTERVENTION NOTE - COMMENTS
Confirmed outpatient coverage of eliquis -$137 copay. Discussed using coupon for free 30 day supply
Counseled patient on heart failure medication
Counseled patient on new start eliquis; answered all questions

## 2021-05-18 NOTE — PROGRESS NOTE ADULT - ASSESSMENT
1.Mild dilated cardiomyopathy with EF about 40%. CHF symptoms have resolved with diuresis and rate control.  2. Paroxysmal atrial fibrillation. Rate control at rest is satisfactory. Restoration of sinus rhythm is preferred to improve cardiac output. EP consult note appreciated. EP to consider WENDI/cardioversion depending upon the stress test results. He may require antiarrhythmic therapy or ablation to maintain sinus rhythm.  3. Prediabetes.   4. Obesity.  PLAN:  complete Lexiscan MPI scan today.  WENDI/electrical cardioversion today Continue furosemide, KCl, spironolactone, diltiazem, Eliquis.

## 2021-05-18 NOTE — DISCHARGE NOTE PROVIDER - PROVIDER TOKENS
PROVIDER:[TOKEN:[[id number]],FOLLOWUP:[1 week]],PROVIDER:[TOKEN:[[id number]],FOLLOWUP:[1 month]]

## 2021-05-24 DIAGNOSIS — I27.20 PULMONARY HYPERTENSION, UNSPECIFIED: ICD-10-CM

## 2021-05-24 DIAGNOSIS — I51.3 INTRACARDIAC THROMBOSIS, NOT ELSEWHERE CLASSIFIED: ICD-10-CM

## 2021-05-24 DIAGNOSIS — Z91.14 PATIENT'S OTHER NONCOMPLIANCE WITH MEDICATION REGIMEN: ICD-10-CM

## 2021-05-24 DIAGNOSIS — Z86.16 PERSONAL HISTORY OF COVID-19: ICD-10-CM

## 2021-05-24 DIAGNOSIS — I48.0 PAROXYSMAL ATRIAL FIBRILLATION: ICD-10-CM

## 2021-05-24 DIAGNOSIS — G47.33 OBSTRUCTIVE SLEEP APNEA (ADULT) (PEDIATRIC): ICD-10-CM

## 2021-05-24 DIAGNOSIS — R73.03 PREDIABETES: ICD-10-CM

## 2021-05-24 DIAGNOSIS — I50.23 ACUTE ON CHRONIC SYSTOLIC (CONGESTIVE) HEART FAILURE: ICD-10-CM

## 2021-05-24 DIAGNOSIS — I42.0 DILATED CARDIOMYOPATHY: ICD-10-CM

## 2021-05-24 DIAGNOSIS — E66.01 MORBID (SEVERE) OBESITY DUE TO EXCESS CALORIES: ICD-10-CM

## 2021-05-24 DIAGNOSIS — R06.02 SHORTNESS OF BREATH: ICD-10-CM

## 2021-05-24 PROBLEM — I48.91 UNSPECIFIED ATRIAL FIBRILLATION: Chronic | Status: ACTIVE | Noted: 2021-05-14

## 2021-06-04 DIAGNOSIS — Z82.49 FAMILY HISTORY OF ISCHEMIC HEART DISEASE AND OTHER DISEASES OF THE CIRCULATORY SYSTEM: ICD-10-CM

## 2021-06-04 DIAGNOSIS — Z78.9 OTHER SPECIFIED HEALTH STATUS: ICD-10-CM

## 2021-06-07 ENCOUNTER — NON-APPOINTMENT (OUTPATIENT)
Age: 61
End: 2021-06-07

## 2021-06-07 ENCOUNTER — APPOINTMENT (OUTPATIENT)
Dept: ELECTROPHYSIOLOGY | Facility: CLINIC | Age: 61
End: 2021-06-07
Payer: COMMERCIAL

## 2021-06-07 VITALS
HEART RATE: 76 BPM | WEIGHT: 315 LBS | BODY MASS INDEX: 41.75 KG/M2 | OXYGEN SATURATION: 98 % | HEIGHT: 73 IN | SYSTOLIC BLOOD PRESSURE: 90 MMHG | RESPIRATION RATE: 18 BRPM | DIASTOLIC BLOOD PRESSURE: 54 MMHG

## 2021-06-07 DIAGNOSIS — I51.3 INTRACARDIAC THROMBOSIS, NOT ELSEWHERE CLASSIFIED: ICD-10-CM

## 2021-06-07 PROCEDURE — 99214 OFFICE O/P EST MOD 30 MIN: CPT

## 2021-06-07 PROCEDURE — 93000 ELECTROCARDIOGRAM COMPLETE: CPT

## 2021-06-07 PROCEDURE — 99072 ADDL SUPL MATRL&STAF TM PHE: CPT

## 2021-06-07 RX ORDER — POTASSIUM CHLORIDE 1500 MG/1
20 TABLET, FILM COATED, EXTENDED RELEASE ORAL
Qty: 28 | Refills: 0 | Status: DISCONTINUED | COMMUNITY
Start: 2021-05-18 | End: 2021-06-07

## 2021-06-07 RX ORDER — AMOXICILLIN 500 MG/1
500 TABLET, FILM COATED ORAL
Qty: 21 | Refills: 0 | Status: DISCONTINUED | COMMUNITY
Start: 2021-04-30 | End: 2021-06-07

## 2021-07-09 PROBLEM — I51.3 THROMBUS OF LEFT ATRIAL APPENDAGE: Status: ACTIVE | Noted: 2021-07-09

## 2021-07-09 NOTE — CARDIOLOGY SUMMARY
[de-identified] : 6/7/18 : Atrial fibrillation 103 bpm.  [de-identified] : 5/18/21 :  WENDI The mitral valve leaflets appear thin and normal.  Moderate (2+) mitral regurgitation is present.  A saline contrast study was performed and showed no evidence of a patent  foramen ovale.  Thrombus seen in the left atrial appendage.  Moderately reduced left ventricular systolic function.  Estimated left ventricular ejection fraction is 35 %.\par \par 5/16/21 : TTE  Endocardium is not well visualized; grossly the left ventricle appears  enlarged with mild systolic dysfunction. Visual  stimation of left  ventricle ejection fraction is 45%.  The IVC is dilated with decreased respiratory variation, suggesting\par  elevated right atrial pressure.  Mild to moderate mitral regurgitation.  Mild tricuspid valve regurgitation.  Mild pulmonary hypertension.

## 2021-07-09 NOTE — ASSESSMENT
[FreeTextEntry1] : This is a 61 year old man with nonischemic dilated cardiomyopathy EF 45% with atrial fibrillation and MAY thrombus. \par \par He will need repeat WENDI and cardioversion. Will  initiated antiarrhythmics once thrombus has been excluded.  Tikosyn will be best option given age and heart failure. \par \par During this visit, VIMAL NILS  and I had a comprehensive discussion of arrhythmia management using principles of shared decision-making. This included a discussion of anti-arrhythmic medications including class I agents (e.g. flecainide), class III agents (e.g. amiodarone, dofetilide, sotalol, etc.), and both class II and IV agents (beta-blockers and calcium channel blockers). We reviewed the potentially life-threatening side effects of these medications, including (but not limited to) fatal tachyarrhythmias, pulmonary toxicity, liver toxicity, thyroid disorders. We also reviewed the requisite monitoring required of some of these medications. In addition, we reviewed ablative therapy, including the potential benefits and risks of catheter ablation. These risks include death, myocardial infarction, stroke, cardiac perforation, vascular injury, and other less severe complications. Mr. WRAY  demonstrated a clear understanding of these issues during our discussion.

## 2021-07-09 NOTE — HISTORY OF PRESENT ILLNESS
[FreeTextEntry1] : This is a 60 year old man with Obesity, Dilated Cardiomyopathy with EF 40% (2017),  Systolic CHF,  paroxysmal Atrial fib s/p prior WENDI cardioversion (2017), possible OMAYRA,  COVID in December 2020 who presents after hospitalization in May 2021. for recurrent atrial fibrilllation. A WENDI demonstrated MAY clot.  VIMAL NILS denies bleeding, bruising, chest pain, confusion, epistaxis, falls/injuries, hematochezia, hematemesis, hematuria, hemoptysis, melena, Pain/swelling in LE, or SOB

## 2021-07-31 DIAGNOSIS — Z01.818 ENCOUNTER FOR OTHER PREPROCEDURAL EXAMINATION: ICD-10-CM

## 2021-08-01 ENCOUNTER — APPOINTMENT (OUTPATIENT)
Dept: DISASTER EMERGENCY | Facility: CLINIC | Age: 61
End: 2021-08-01

## 2021-08-01 LAB — SARS-COV-2 N GENE NPH QL NAA+PROBE: NOT DETECTED

## 2021-08-04 ENCOUNTER — OUTPATIENT (OUTPATIENT)
Dept: OUTPATIENT SERVICES | Facility: HOSPITAL | Age: 61
LOS: 1 days | Discharge: HOME | End: 2021-08-04
Payer: COMMERCIAL

## 2021-08-04 ENCOUNTER — TRANSCRIPTION ENCOUNTER (OUTPATIENT)
Age: 61
End: 2021-08-04

## 2021-08-04 VITALS — WEIGHT: 315 LBS | HEIGHT: 73 IN

## 2021-08-04 VITALS — RESPIRATION RATE: 16 BRPM | DIASTOLIC BLOOD PRESSURE: 68 MMHG | HEART RATE: 57 BPM | SYSTOLIC BLOOD PRESSURE: 116 MMHG

## 2021-08-04 DIAGNOSIS — Z90.89 ACQUIRED ABSENCE OF OTHER ORGANS: Chronic | ICD-10-CM

## 2021-08-04 DIAGNOSIS — I48.91 UNSPECIFIED ATRIAL FIBRILLATION: ICD-10-CM

## 2021-08-04 LAB
ANION GAP SERPL CALC-SCNC: 2 MMOL/L — LOW (ref 5–17)
BUN SERPL-MCNC: 24 MG/DL — HIGH (ref 7–23)
CALCIUM SERPL-MCNC: 8.4 MG/DL — LOW (ref 8.5–10.1)
CHLORIDE SERPL-SCNC: 105 MMOL/L — SIGNIFICANT CHANGE UP (ref 96–108)
CO2 SERPL-SCNC: 29 MMOL/L — SIGNIFICANT CHANGE UP (ref 22–31)
CREAT SERPL-MCNC: 1.08 MG/DL — SIGNIFICANT CHANGE UP (ref 0.5–1.3)
GLUCOSE SERPL-MCNC: 113 MG/DL — HIGH (ref 70–99)
HCT VFR BLD CALC: 41.2 % — SIGNIFICANT CHANGE UP (ref 39–50)
HGB BLD-MCNC: 13.2 G/DL — SIGNIFICANT CHANGE UP (ref 13–17)
MCHC RBC-ENTMCNC: 29.8 PG — SIGNIFICANT CHANGE UP (ref 27–34)
MCHC RBC-ENTMCNC: 32 GM/DL — SIGNIFICANT CHANGE UP (ref 32–36)
MCV RBC AUTO: 93 FL — SIGNIFICANT CHANGE UP (ref 80–100)
PLATELET # BLD AUTO: 297 K/UL — SIGNIFICANT CHANGE UP (ref 150–400)
POTASSIUM SERPL-MCNC: 4.5 MMOL/L — SIGNIFICANT CHANGE UP (ref 3.5–5.3)
POTASSIUM SERPL-SCNC: 4.5 MMOL/L — SIGNIFICANT CHANGE UP (ref 3.5–5.3)
RBC # BLD: 4.43 M/UL — SIGNIFICANT CHANGE UP (ref 4.2–5.8)
RBC # FLD: 17.2 % — HIGH (ref 10.3–14.5)
SODIUM SERPL-SCNC: 136 MMOL/L — SIGNIFICANT CHANGE UP (ref 135–145)
WBC # BLD: 10.79 K/UL — HIGH (ref 3.8–10.5)
WBC # FLD AUTO: 10.79 K/UL — HIGH (ref 3.8–10.5)

## 2021-08-04 PROCEDURE — 36415 COLL VENOUS BLD VENIPUNCTURE: CPT

## 2021-08-04 PROCEDURE — 93010 ELECTROCARDIOGRAM REPORT: CPT | Mod: 76

## 2021-08-04 PROCEDURE — 92960 CARDIOVERSION ELECTRIC EXT: CPT

## 2021-08-04 PROCEDURE — 93320 DOPPLER ECHO COMPLETE: CPT

## 2021-08-04 PROCEDURE — 80048 BASIC METABOLIC PNL TOTAL CA: CPT

## 2021-08-04 PROCEDURE — 93325 DOPPLER ECHO COLOR FLOW MAPG: CPT

## 2021-08-04 PROCEDURE — 85027 COMPLETE CBC AUTOMATED: CPT

## 2021-08-04 PROCEDURE — 93005 ELECTROCARDIOGRAM TRACING: CPT

## 2021-08-04 PROCEDURE — 93312 ECHO TRANSESOPHAGEAL: CPT

## 2021-08-04 NOTE — PROCEDURAL SAFETY CHECKLIST WITH OR WITHOUT SEDATION - NSPOSTCOMMENTFT_GEN_ALL_CORE
Patient is here for WENDI/CV.  All equipment for anesthesia at bedside.  Time out/Brief @ 0730, Anesthesia given @ 0737, Probe in @ 0738, No bubble study, Probe out 0749, Cardioverted by Dr. Stevenson with 200J x 1-NSR confirmed with EKG.  Took over from anesthesia @ 0752.  Will monitor during recovery until discharge home when fully awake.

## 2021-08-04 NOTE — PROCEDURE NOTE - ADDITIONAL PROCEDURE DETAILS
Left atrial appendage was cleared of any thrombus by Dr. Jacobo.   Plan: Continue anticoagulation.   Dispo: Home.

## 2021-08-04 NOTE — PACU DISCHARGE NOTE - COMMENTS
Patient discharge home as per orders. pt A/Ox4. Vital signs stable. PIVL removed. No evidence of infiltration noted at discharge. Patient skin intact, Patient with no complain of pain, SOB, or chest pain at discharge. pt ambulating around unit tolerating well. All paperwork reviewed with pt Rx given with understanding, pt to follow up as directed patient verbalized understanding to all and without concerns at this time.

## 2021-08-09 DIAGNOSIS — Z82.49 FAMILY HISTORY OF ISCHEMIC HEART DISEASE AND OTHER DISEASES OF THE CIRCULATORY SYSTEM: ICD-10-CM

## 2021-08-09 DIAGNOSIS — I48.0 PAROXYSMAL ATRIAL FIBRILLATION: ICD-10-CM

## 2021-08-09 DIAGNOSIS — Z83.3 FAMILY HISTORY OF DIABETES MELLITUS: ICD-10-CM

## 2021-08-09 DIAGNOSIS — Z79.01 LONG TERM (CURRENT) USE OF ANTICOAGULANTS: ICD-10-CM

## 2021-08-09 DIAGNOSIS — I42.0 DILATED CARDIOMYOPATHY: ICD-10-CM

## 2021-08-09 DIAGNOSIS — Z86.16 PERSONAL HISTORY OF COVID-19: ICD-10-CM

## 2021-08-09 DIAGNOSIS — Z95.1 PRESENCE OF AORTOCORONARY BYPASS GRAFT: ICD-10-CM

## 2021-08-09 DIAGNOSIS — I08.1 RHEUMATIC DISORDERS OF BOTH MITRAL AND TRICUSPID VALVES: ICD-10-CM

## 2021-08-09 DIAGNOSIS — I10 ESSENTIAL (PRIMARY) HYPERTENSION: ICD-10-CM

## 2021-08-09 DIAGNOSIS — Z85.118 PERSONAL HISTORY OF OTHER MALIGNANT NEOPLASM OF BRONCHUS AND LUNG: ICD-10-CM

## 2021-08-09 DIAGNOSIS — R06.02 SHORTNESS OF BREATH: ICD-10-CM

## 2021-08-09 DIAGNOSIS — E66.01 MORBID (SEVERE) OBESITY DUE TO EXCESS CALORIES: ICD-10-CM

## 2021-08-09 DIAGNOSIS — Z80.1 FAMILY HISTORY OF MALIGNANT NEOPLASM OF TRACHEA, BRONCHUS AND LUNG: ICD-10-CM

## 2021-09-08 ENCOUNTER — NON-APPOINTMENT (OUTPATIENT)
Age: 61
End: 2021-09-08

## 2021-09-08 ENCOUNTER — APPOINTMENT (OUTPATIENT)
Dept: ELECTROPHYSIOLOGY | Facility: CLINIC | Age: 61
End: 2021-09-08
Payer: COMMERCIAL

## 2021-09-08 VITALS
HEART RATE: 74 BPM | HEIGHT: 73 IN | BODY MASS INDEX: 41.75 KG/M2 | DIASTOLIC BLOOD PRESSURE: 70 MMHG | WEIGHT: 315 LBS | RESPIRATION RATE: 16 BRPM | OXYGEN SATURATION: 99 % | SYSTOLIC BLOOD PRESSURE: 109 MMHG

## 2021-09-08 PROCEDURE — 93000 ELECTROCARDIOGRAM COMPLETE: CPT

## 2021-09-08 PROCEDURE — 99214 OFFICE O/P EST MOD 30 MIN: CPT

## 2021-09-08 RX ORDER — IBUPROFEN 800 MG/1
800 TABLET, FILM COATED ORAL
Qty: 20 | Refills: 0 | Status: DISCONTINUED | COMMUNITY
Start: 2021-04-30 | End: 2021-09-08

## 2021-09-13 ENCOUNTER — APPOINTMENT (OUTPATIENT)
Dept: ELECTROPHYSIOLOGY | Facility: CLINIC | Age: 61
End: 2021-09-13

## 2021-09-17 ENCOUNTER — APPOINTMENT (OUTPATIENT)
Dept: CARDIOLOGY | Facility: CLINIC | Age: 61
End: 2021-09-17
Payer: COMMERCIAL

## 2021-09-17 VITALS
WEIGHT: 315 LBS | HEIGHT: 73 IN | DIASTOLIC BLOOD PRESSURE: 55 MMHG | OXYGEN SATURATION: 99 % | BODY MASS INDEX: 41.75 KG/M2 | SYSTOLIC BLOOD PRESSURE: 100 MMHG | HEART RATE: 67 BPM

## 2021-09-17 PROCEDURE — 99244 OFF/OP CNSLTJ NEW/EST MOD 40: CPT

## 2021-09-17 PROCEDURE — 93000 ELECTROCARDIOGRAM COMPLETE: CPT

## 2021-09-20 NOTE — PHYSICAL EXAM
[Well Developed] : well developed [Normal Conjunctiva] : normal conjunctiva [Normal Venous Pressure] : normal venous pressure [No Carotid Bruit] : no carotid bruit [Normal S1, S2] : normal S1, S2 [Clear Lung Fields] : clear lung fields [Good Air Entry] : good air entry [Soft] : abdomen soft [Non Tender] : non-tender [No Masses/organomegaly] : no masses/organomegaly [Normal Gait] : normal gait [No Edema] : no edema [No Cyanosis] : no cyanosis [No Rash] : no rash [No Skin Lesions] : no skin lesions

## 2021-09-20 NOTE — DISCUSSION/SUMMARY
[FreeTextEntry1] : 60 year old man with Obesity, dilated cardiomyopathy with EF 40% (2017, thought to be from atrial fibrillation), HF, paroxysmal Atrial fib s/p prior WENDI cardioversion (2017), possible OMAYRA, COVID in December 2020 who presents to me today for first time for cardiac care. \par \par HFrEF 45% presumed to be from pAF \par Euvolemic on exam. Long standing cardiomyopathy with TEEs recently revealing mild LV dysfunction as well. No recent TTE for diastolic dysfunction, etc. Moderate MR present as well with mild PH. \par -continue metoprolol 50 mg XL \par -continue lisinopril 10 mg daily \par -c/w spironolactone 25 mg with potassium sparing in setting of furosemide 40 mg daily\par -c/w furosemide 40 mg daily \par -c/w apixaban 5 mg q12 hours\par -plan for left heart catheterization for cardiomyopathy to rule out ischemia in setting of cardiomyopathy.\par \par Follow up in 1 month.

## 2021-09-27 ENCOUNTER — APPOINTMENT (OUTPATIENT)
Dept: DISASTER EMERGENCY | Facility: CLINIC | Age: 61
End: 2021-09-27

## 2021-09-28 LAB — SARS-COV-2 N GENE NPH QL NAA+PROBE: NOT DETECTED

## 2021-09-29 NOTE — H&P ADULT - HISTORY OF PRESENT ILLNESS
60yr old male PMH Afib, dilated cardiomyopathy, OMAYRA 60yr old male PMH Afib (on Eliqus last dose 9/27/21), morbid obesity, dilated cardiomyopathy, HTN, HF ( EF 40%) , denies all pain, sob. Presents  to cardiac cath for LHC with possible PCI.   Covid - PST 60yr old male PMH Afib (on Eliqus last dose 9/27/21), morbid obesity, dilated cardiomyopathy, HTN, HF ( EF 40%) , denies all pain, sob.due to persistent Afib and comorbidities pt  presents  to cardiac catheretization for further evaluation with possible PCI  Covid neg PST

## 2021-09-29 NOTE — H&P ADULT - NSICDXPASTMEDICALHX_GEN_ALL_CORE_FT
PAST MEDICAL HISTORY:  Afib     Obesity      PAST MEDICAL HISTORY:  Afib     HTN (hypertension)     Obesity

## 2021-09-29 NOTE — ASU PATIENT PROFILE, ADULT - VISION (WITH CORRECTIVE LENSES IF THE PATIENT USUALLY WEARS THEM):
glasses at bedside/Partially impaired: cannot see medication labels or newsprint, but can see obstacles in path, and the surrounding layout; can count fingers at arm's length

## 2021-09-29 NOTE — H&P ADULT - NSHPPHYSICALEXAM_GEN_ALL_CORE
PHYSICAL EXAM  GENERAL: NAD, AAOx3, Morbid obesity  HEAD:  Atraumatic, Normocephalic  EYES: EOMI, PERRLA, conjunctiva and sclera clear  NECK: Supple, No JVD, No LAD  CHEST/LUNG: Clear to auscultation bilaterally; No wheeze  HEART: s1 s2 Regular rate and rhythm; No murmurs, rubs, or gallops  ABDOMEN: Soft, Nontender, Nondistended; Bowel sounds present X 4 quadrants   EXTREMITIES:  2+ Peripheral Pulses, No clubbing, cyanosis, or edema  SKIN: No rashes or lesions,  b/l LE not red, cool to touch,  no open skin no drainage  NEURO: nonfocal CN/motor/sensory/reflexes  Psych: normal affect and behavior, calm and cooperative

## 2021-09-29 NOTE — H&P ADULT - NSHPLABSRESULTS_GEN_ALL_CORE
< from: WENDI Complete w/Spect and Color (08.04.21 @ 07:43) >     Findings   Mitral Valve   The mitral valve leaflets appear thin and normal.   Moderate (2+) mitral regurgitation is present.   Aortic Valve   Normal aortic valve structure and function.   Tricuspid Valve   Normal appearing tricuspid valve structure and function.   Mild (1+) tricuspid valve regurgitation is present.   Pulmonic Valve   Normal appearing pulmonic valve structure and function.   Left Atrium   No thrombus seen in the left atrial appendage.   No left atrial thrombus or mass is seen.   Left Ventricle   The left ventricle is normal in size.   Estimated left ventricular ejection fraction is 40 %.   Right Atrium   Normal appearing right atrium.   Right Ventricle   Normal appearing right ventricle structure and function. 8/4/21 WENDI    Findings    The left ventricle is normal in size.   Estimated left ventricular ejection fraction is 40 %.     9/8/21 EKG  Afib rate 74

## 2021-09-29 NOTE — H&P ADULT - ASSESSMENT
60yr old male PMH Afib (on Eliqus last dose 9/27/21), morbid obesity,  dilated cardiomyopathy, HTN, HF ( EF 40%) , denies all pain, sob. Presents  to cardiac cath for C with possible PCI    ASA class: II  Creatinine:   GFR:  Bleeding  Risk score:      -Consent obtained for cardiac catheterization w/ coronary angiogram and possible stent placement. Pt is competent, has capacity, and understands risks and benefits of procedure. Risks and benefits discussed. Risk discussed included, but not limited to MI, stroke, mortality, major bleeding, arrythmia, or infection. All questions answered        60yr old male PMH Afib (on Eliqus last dose 9/27/21), morbid obesity,  dilated cardiomyopathy, HTN, HF ( EF 40%) , denies all pain, sob, due to persistent Afib and comorbidities pt  presents  to cardiac catheretization for further evaluation with possible PCI    ASA class: II  Creatinine:  1.04  GFR: 76  Bleeding  Risk score: 0.9%      -Consent obtained for cardiac catheterization w/ coronary angiogram and possible stent placement. Pt is competent, has capacity, and understands risks and benefits of procedure. Risks and benefits discussed. Risk discussed included, but not limited to MI, stroke, mortality, major bleeding, arrythmia, or infection. All questions answered

## 2021-09-30 ENCOUNTER — OUTPATIENT (OUTPATIENT)
Dept: OUTPATIENT SERVICES | Facility: HOSPITAL | Age: 61
LOS: 1 days | Discharge: HOME | End: 2021-09-30
Payer: COMMERCIAL

## 2021-09-30 VITALS
OXYGEN SATURATION: 100 % | RESPIRATION RATE: 18 BRPM | HEART RATE: 76 BPM | SYSTOLIC BLOOD PRESSURE: 127 MMHG | DIASTOLIC BLOOD PRESSURE: 76 MMHG

## 2021-09-30 VITALS — OXYGEN SATURATION: 100 % | HEART RATE: 98 BPM | WEIGHT: 315 LBS | HEIGHT: 73 IN | RESPIRATION RATE: 16 BRPM

## 2021-09-30 DIAGNOSIS — I51.9 HEART DISEASE, UNSPECIFIED: ICD-10-CM

## 2021-09-30 DIAGNOSIS — Z90.89 ACQUIRED ABSENCE OF OTHER ORGANS: Chronic | ICD-10-CM

## 2021-09-30 DIAGNOSIS — I25.10 ATHEROSCLEROTIC HEART DISEASE OF NATIVE CORONARY ARTERY WITHOUT ANGINA PECTORIS: ICD-10-CM

## 2021-09-30 LAB
ANION GAP SERPL CALC-SCNC: 8 MMOL/L — SIGNIFICANT CHANGE UP (ref 5–17)
BUN SERPL-MCNC: 19 MG/DL — SIGNIFICANT CHANGE UP (ref 7–23)
CALCIUM SERPL-MCNC: 8.2 MG/DL — LOW (ref 8.5–10.1)
CHLORIDE SERPL-SCNC: 103 MMOL/L — SIGNIFICANT CHANGE UP (ref 96–108)
CO2 SERPL-SCNC: 24 MMOL/L — SIGNIFICANT CHANGE UP (ref 22–31)
CREAT SERPL-MCNC: 1.04 MG/DL — SIGNIFICANT CHANGE UP (ref 0.5–1.3)
GLUCOSE SERPL-MCNC: 113 MG/DL — HIGH (ref 70–99)
HCT VFR BLD CALC: 42 % — SIGNIFICANT CHANGE UP (ref 39–50)
HGB BLD-MCNC: 13.7 G/DL — SIGNIFICANT CHANGE UP (ref 13–17)
MCHC RBC-ENTMCNC: 31.4 PG — SIGNIFICANT CHANGE UP (ref 27–34)
MCHC RBC-ENTMCNC: 32.6 GM/DL — SIGNIFICANT CHANGE UP (ref 32–36)
MCV RBC AUTO: 96.1 FL — SIGNIFICANT CHANGE UP (ref 80–100)
PLATELET # BLD AUTO: 344 K/UL — SIGNIFICANT CHANGE UP (ref 150–400)
POTASSIUM SERPL-MCNC: 4.7 MMOL/L — SIGNIFICANT CHANGE UP (ref 3.5–5.3)
POTASSIUM SERPL-SCNC: 4.7 MMOL/L — SIGNIFICANT CHANGE UP (ref 3.5–5.3)
RBC # BLD: 4.37 M/UL — SIGNIFICANT CHANGE UP (ref 4.2–5.8)
RBC # FLD: 14.5 % — SIGNIFICANT CHANGE UP (ref 10.3–14.5)
SODIUM SERPL-SCNC: 135 MMOL/L — SIGNIFICANT CHANGE UP (ref 135–145)
WBC # BLD: 11.22 K/UL — HIGH (ref 3.8–10.5)
WBC # FLD AUTO: 11.22 K/UL — HIGH (ref 3.8–10.5)

## 2021-09-30 PROCEDURE — C1887: CPT

## 2021-09-30 PROCEDURE — 36415 COLL VENOUS BLD VENIPUNCTURE: CPT

## 2021-09-30 PROCEDURE — 80048 BASIC METABOLIC PNL TOTAL CA: CPT

## 2021-09-30 PROCEDURE — 93458 L HRT ARTERY/VENTRICLE ANGIO: CPT

## 2021-09-30 PROCEDURE — 85027 COMPLETE CBC AUTOMATED: CPT

## 2021-09-30 PROCEDURE — C1894: CPT

## 2021-09-30 PROCEDURE — C1769: CPT

## 2021-09-30 RX ORDER — APIXABAN 2.5 MG/1
1 TABLET, FILM COATED ORAL
Qty: 60 | Refills: 0
Start: 2021-09-30 | End: 2021-10-29

## 2021-09-30 RX ORDER — APIXABAN 2.5 MG/1
1 TABLET, FILM COATED ORAL
Refills: 0 | DISCHARGE
Start: 2021-09-30

## 2021-09-30 NOTE — PROGRESS NOTE ADULT - SUBJECTIVE AND OBJECTIVE BOX
Nurse Practitioner Progress note:     HPI:  60yr old male PMH Afib (on Eliqus last dose 9/27/21), morbid obesity, dilated cardiomyopathy, HTN, HF ( EF 40%) , denies all pain, sob.due to persistent Afib and comorbidities pt  presents  to cardiac catheretization for further evaluation with possible PCI  Covid neg PST     T(C): 36.7 HR: 82 BP: 127/66 RR: 18 SpO2: 97%       PHYSICAL EXAM:  NEURO: Non-focal, AxOx3.  No neuro deficits NECK: Supple, No JVD, No LAD  CHEST/LUNG: Clear to auscultation bilaterally; No wheeze  HEART: s1 s2 Regular rate and rhythm; No murmurs, rubs, or gallops  ABDOMEN: Soft, Nontender, Nondistended; Bowel sounds present X 4 quadrants   EXTREMITIES:  2+ Peripheral Pulses, No clubbing, cyanosis, or edema   VASCULAR: Peripheral pulses palpable 2+ bilaterally  PROCEDURE SITE: right band removed one hour post procedure ,Site is without hematoma or bleeding. Sensation and ANALIA intact. Distal pulses palpable 2+, capillary refill < 2 seconds. Patient denies pain, numbness, tingling, CP or SOB. Clean dry dressing applied     PROCEDURE RESULTS:  Full report to follow     ASSESSMENT: HPI:  60yr old male PMH Afib (on Eliqus last dose 9/27/21), morbid obesity, dilated cardiomyopathy, HTN, HF ( EF 40%) , denies all pain, sob.due to persistent Afib and comorbidities pt  presents  to cardiac catheretization for further evaluation with possible PCI  LHC revealing non obstructive disease.         PLAN:  -VS, diet, activity as per post cath orders  -Encourage PO fluids  -Continue current medications  -Resume Apixaban tonight 9/30/21  -Plan of care discussed with patient and Dr Aragon  -Post cath instructions reviewed, patient verbalizes and understands instructions  - Patient to be discharged home, recommend following up with cardiologist in 2 weeks           Nurse Practitioner Progress note:     HPI:  60yr old male PMH Afib (on Eliqus last dose 9/27/21), morbid obesity, dilated cardiomyopathy, HTN, HF ( EF 40%) , denies all pain, sob.due to persistent Afib and comorbidities pt  presents  to cardiac catheretization for further evaluation with possible PCI  Covid neg PST     T(C): 36.7 HR: 82 BP: 127/66 RR: 18 SpO2: 97%       PHYSICAL EXAM:  NEURO: Non-focal, AxOx3.  No neuro deficits NECK: Supple, No JVD, No LAD  CHEST/LUNG: Clear to auscultation bilaterally; No wheeze  HEART: s1 s2 Regular rate and rhythm; No murmurs, rubs, or gallops  ABDOMEN: Soft, Nontender, Nondistended; Bowel sounds present X 4 quadrants   EXTREMITIES:  2+ Peripheral Pulses, No clubbing, cyanosis, or edema   VASCULAR: Peripheral pulses palpable 2+ bilaterally  PROCEDURE SITE: right band removed one hour post procedure ,Site is without hematoma or bleeding. Sensation and ANALIA intact. Distal pulses palpable 2+, capillary refill < 2 seconds. Patient denies pain, numbness, tingling, CP or SOB. Clean dry dressing applied     PROCEDURE RESULTS:  Full report to follow     ASSESSMENT: HPI:  60yr old male PMH Afib (on Eliqus last dose 9/27/21), morbid obesity, dilated cardiomyopathy, HTN, HF ( EF 40%) , denies all pain, sob.due to persistent Afib and comorbidities pt  presents  to cardiac catheretization for further evaluation with possible PCI  LHC revealing non obstructive disease.         PLAN:  -VS, diet, activity as per post cath orders  -Encourage PO fluids  -Continue current medications  -Resume Apixaban tonight 9/30/21  -Resume Furosemide tomorrow 10/1/21  -Plan of care discussed with patient and Dr Aragon  -Post cath instructions reviewed, patient verbalizes and understands instructions  - Patient to be discharged home, recommend following up with cardiologist in 1-2 weeks

## 2021-10-01 DIAGNOSIS — Z86.16 PERSONAL HISTORY OF COVID-19: ICD-10-CM

## 2021-10-01 DIAGNOSIS — I50.9 HEART FAILURE, UNSPECIFIED: ICD-10-CM

## 2021-10-01 DIAGNOSIS — E66.01 MORBID (SEVERE) OBESITY DUE TO EXCESS CALORIES: ICD-10-CM

## 2021-10-01 DIAGNOSIS — I27.20 PULMONARY HYPERTENSION, UNSPECIFIED: ICD-10-CM

## 2021-10-01 DIAGNOSIS — I08.1 RHEUMATIC DISORDERS OF BOTH MITRAL AND TRICUSPID VALVES: ICD-10-CM

## 2021-10-01 DIAGNOSIS — I25.10 ATHEROSCLEROTIC HEART DISEASE OF NATIVE CORONARY ARTERY WITHOUT ANGINA PECTORIS: ICD-10-CM

## 2021-10-01 DIAGNOSIS — I48.19 OTHER PERSISTENT ATRIAL FIBRILLATION: ICD-10-CM

## 2021-10-01 DIAGNOSIS — R06.02 SHORTNESS OF BREATH: ICD-10-CM

## 2021-10-01 DIAGNOSIS — I48.0 PAROXYSMAL ATRIAL FIBRILLATION: ICD-10-CM

## 2021-10-01 DIAGNOSIS — Z79.01 LONG TERM (CURRENT) USE OF ANTICOAGULANTS: ICD-10-CM

## 2021-10-01 DIAGNOSIS — E78.00 PURE HYPERCHOLESTEROLEMIA, UNSPECIFIED: ICD-10-CM

## 2021-10-01 DIAGNOSIS — I11.0 HYPERTENSIVE HEART DISEASE WITH HEART FAILURE: ICD-10-CM

## 2021-10-01 DIAGNOSIS — I42.0 DILATED CARDIOMYOPATHY: ICD-10-CM

## 2021-10-01 PROBLEM — I10 ESSENTIAL (PRIMARY) HYPERTENSION: Chronic | Status: ACTIVE | Noted: 2021-09-30

## 2021-10-06 NOTE — CARDIOLOGY SUMMARY
[de-identified] : 9/8/21 : Atrial fibrillation  74 bpm \par \par 6/7/18 : Atrial fibrillation 103 bpm.  [de-identified] : 5/18/21 :  WENDI The mitral valve leaflets appear thin and normal.  Moderate (2+) mitral regurgitation is present.  A saline contrast study was performed and showed no evidence of a patent  foramen ovale.  Thrombus seen in the left atrial appendage.  Moderately reduced left ventricular systolic function.  Estimated left ventricular ejection fraction is 35 %.\par \par 5/16/21 : TTE  Endocardium is not well visualized; grossly the left ventricle appears  enlarged with mild systolic dysfunction. Visual  stimation of left  ventricle ejection fraction is 45%.  The IVC is dilated with decreased respiratory variation, suggesting\par  elevated right atrial pressure.  Mild to moderate mitral regurgitation.  Mild tricuspid valve regurgitation.  Mild pulmonary hypertension.

## 2021-10-06 NOTE — PHYSICAL EXAM

## 2021-10-06 NOTE — ASSESSMENT
[FreeTextEntry1] : This is a 61 year old man with nonischemic dilated cardiomyopathy EF 45% with atrial fibrillation and MAY thrombus no s/p WENDI cardioversion with recurrent atrial fibrillation.\par \par During this visit, VIMAL WRAY  and I had a comprehensive discussion of arrhythmia management using principles of shared decision-making. This included a discussion of anti-arrhythmic medications including class I agents (e.g. flecainide), class III agents (e.g. amiodarone, dofetilide, sotalol, etc.), and both class II and IV agents (beta-blockers and calcium channel blockers). We reviewed the potentially life-threatening side effects of these medications, including (but not limited to) fatal tachyarrhythmias, pulmonary toxicity, liver toxicity, thyroid disorders. We also reviewed the requisite monitoring required of some of these medications. In addition, we reviewed ablative therapy, including the potential benefits and risks of catheter ablation. These risks include death, myocardial infarction, stroke, cardiac perforation, vascular injury, and other less severe complications. Sofia NILS  demonstrated a clear understanding of these issues during our discussion. \par \par He will get his dental work completed and then final decision Tikosyn vs Ablation will be made.

## 2021-10-06 NOTE — HISTORY OF PRESENT ILLNESS
[FreeTextEntry1] : This is a 61 year old man with Obesity, Dilated Cardiomyopathy with EF 40% (2017),  Systolic CHF,  paroxysmal Atrial fib s/p prior WENDI cardioversion (2017), possible OMAYRA,  COVID in December 2020 who presents after hospitalization in May 2021. for recurrent atrial fibrilllation. A WENDI demonstrated MAY clot.  Repeat WENDI on 8/4/21 cleared appendage and cardioversion performed.  VIMAL WRAY denies bleeding, bruising, chest pain, confusion, epistaxis, falls/injuries, hematochezia, hematemesis, hematuria, hemoptysis, melena, Pain/swelling in LE, or SOB

## 2021-10-13 ENCOUNTER — APPOINTMENT (OUTPATIENT)
Dept: CARDIOLOGY | Facility: CLINIC | Age: 61
End: 2021-10-13
Payer: COMMERCIAL

## 2021-10-13 VITALS
WEIGHT: 315 LBS | HEIGHT: 73 IN | OXYGEN SATURATION: 98 % | BODY MASS INDEX: 41.75 KG/M2 | DIASTOLIC BLOOD PRESSURE: 98 MMHG | SYSTOLIC BLOOD PRESSURE: 139 MMHG | HEART RATE: 68 BPM

## 2021-10-13 DIAGNOSIS — I70.90 UNSPECIFIED ATHEROSCLEROSIS: ICD-10-CM

## 2021-10-13 DIAGNOSIS — R60.0 LOCALIZED EDEMA: ICD-10-CM

## 2021-10-13 PROCEDURE — 93000 ELECTROCARDIOGRAM COMPLETE: CPT

## 2021-10-13 PROCEDURE — 99215 OFFICE O/P EST HI 40 MIN: CPT

## 2021-10-13 NOTE — REASON FOR VISIT
[FreeTextEntry3] : Bryn Stevenson  [FreeTextEntry1] : went over angiogram \par start atorvastin 40 mg\par lisinopril 5 to 10 \par needs blood work post \par follow up in 1 month, consider sglt2 down road

## 2021-10-23 NOTE — HISTORY OF PRESENT ILLNESS
[FreeTextEntry1] : Cath 9/30/3021:\par Angiographic Findings\par Cardiac Arteries and Lesion Findings\par LMCA: Mild diffuse atherosclerosis\par LAD: Diffuse mild to moderate atherosclerosis till apex\par Prox LAD: 30% stenosis.Pre procedure SWATHI III flow was noted.\par LCx: Diffuse mild to moderate atherosclerosis.\par Prox CX: 30% stenosis.\par RCA: Diffuse mild to moderate atherosclerosis.\par Mid RCA: 40% stenosis 24 mm length.\par Impression\par Diagnostic Conclusions\par Non-obstructive coronary artery disease. LVEDP of 16.\par Recommendations\par Recommend aggressive medical management of CAD as per ACC/AHA guidelines\par with risk factor modification including weight loss. Patient to follow up\par closely in the office.\par Estimated Blood Loss:4ml.\par Complications:\par No complication.\par \par

## 2021-10-23 NOTE — PHYSICAL EXAM
[Well Developed] : well developed [Normal Conjunctiva] : normal conjunctiva [No Carotid Bruit] : no carotid bruit [Normal S1, S2] : normal S1, S2 [Clear Lung Fields] : clear lung fields [Good Air Entry] : good air entry [Normal Gait] : normal gait [No Edema] : no edema [No Cyanosis] : no cyanosis [No Rash] : no rash [Moves all extremities] : moves all extremities

## 2021-10-23 NOTE — REASON FOR VISIT
[Symptom and Test Evaluation] : symptom and test evaluation [FreeTextEntry3] : Dr. Bryn Bronson  [FreeTextEntry1] : Jung Terry is a 60 year old man with Obesity, dilated cardiomyopathy with EF 40% (2017, thought to be from atrial fibrillation), HF, paroxysmal Atrial fib s/p prior WENDI cardioversion (2017), possible OMAYRA, COVID in December 2020 who presents for follow up post cardiac catheterization. He was found to have diffuse moderate atherosclerosis in his epicardial vessels with no obstructive lesions. \par  He denies bleeding, bruising, chest pain, confusion, epistaxis, falls/injuries, hematochezia, hematemesis, hematuria, hemoptysis, melena, Pain/swelling in LE, or SOB \par \par Another issue is that he has no teeth in place given prior interruption in setting of medical care, thus plan is at some point to come off AC for 2 days to allow him to get his teeth fixed and eat normal diet after. Moldings on 9/22, then dentures 10 days later, mid October operation. After that, there is a plan for antiarrhythmic strategy with in hospital loading. \par

## 2021-10-23 NOTE — ASSESSMENT
[FreeTextEntry1] : 60 year old man with Obesity, dilated cardiomyopathy with EF 40% (2017, thought to be from atrial fibrillation), HF, paroxysmal Atrial fib s/p prior WENDI cardioversion (2017), possible OMAYRA, COVID in December 2020 who presents to me today for first time for cardiac care. \par \par HFrEF 45% presumed to be from pAF \par Euvolemic on exam. Long standing cardiomyopathy with TEEs recently revealing mild LV dysfunction as well. No recent TTE for diastolic dysfunction, etc. Moderate MR present as well with mild PH. \par -continue metoprolol 50 mg XL \par -continue lisinopril 10 mg daily (increased today)\par -spironolactone discontinued, may consider SGLT-2 in future for HFpEF \par -c/w furosemide 40 mg daily \par -c/w apixaban 5 mg q12 hours\par \par CAD\par Moderate diffuse atherosclerosis\par -atorvastatin 40 mg started today \par -on DOAC, no plan for aspirin at this time\par -will measure lipids in 3 months to ensure improvement. \par \par Follow up in 3 months.

## 2021-11-26 ENCOUNTER — TRANSCRIPTION ENCOUNTER (OUTPATIENT)
Age: 61
End: 2021-11-26

## 2022-01-05 ENCOUNTER — RX RENEWAL (OUTPATIENT)
Age: 62
End: 2022-01-05

## 2022-02-08 ENCOUNTER — RX RENEWAL (OUTPATIENT)
Age: 62
End: 2022-02-08

## 2022-06-15 ENCOUNTER — APPOINTMENT (OUTPATIENT)
Dept: CARDIOLOGY | Facility: CLINIC | Age: 62
End: 2022-06-15

## 2022-06-22 NOTE — PRE-OP CHECKLIST - NS PREOP CHK CHLOROHEX WASH
[facility] (1st choice) - Luciana Raymundowolfs in admissions [phone] is waiting to hear back about possible contract for out of [employer] - does not accept non-vaccinated pt's    [facility] - left         CM sent additional referrals:    [facility] (2nd choice)    [facility] [place]    [address] - coming to [facility] tomorrow to meet with pt/family        Electronically signed by EDUARD Nick, JAKOBW on 6/22/2022 at 2:19 PM N/A

## 2022-08-03 ENCOUNTER — NON-APPOINTMENT (OUTPATIENT)
Age: 62
End: 2022-08-03

## 2022-08-03 ENCOUNTER — APPOINTMENT (OUTPATIENT)
Dept: CARDIOLOGY | Facility: CLINIC | Age: 62
End: 2022-08-03

## 2022-08-03 VITALS
HEART RATE: 76 BPM | HEIGHT: 73 IN | DIASTOLIC BLOOD PRESSURE: 75 MMHG | BODY MASS INDEX: 41.75 KG/M2 | SYSTOLIC BLOOD PRESSURE: 123 MMHG | OXYGEN SATURATION: 98 % | WEIGHT: 315 LBS

## 2022-08-03 PROCEDURE — 93000 ELECTROCARDIOGRAM COMPLETE: CPT

## 2022-08-03 PROCEDURE — 99215 OFFICE O/P EST HI 40 MIN: CPT | Mod: 25

## 2022-08-03 RX ORDER — METOPROLOL SUCCINATE 50 MG/1
50 TABLET, EXTENDED RELEASE ORAL
Qty: 30 | Refills: 0 | Status: COMPLETED | COMMUNITY
Start: 2021-05-18 | End: 2022-08-03

## 2022-08-03 NOTE — HISTORY OF PRESENT ILLNESS
[FreeTextEntry1] : Jung Terry is a 60 year old man with Obesity, dilated cardiomyopathy with EF 40% (2017, thought to be from atrial fibrillation), HF, paroxysmal Atrial fib s/p prior WENDI cardioversion (2017), possible OMAYRA, COVID in December 2020 who presents for follow up. \par \par Had cath last year. He was found to have diffuse moderate atherosclerosis in his epicardial vessels with no obstructive lesions. \par  He denies bleeding, bruising, chest pain, confusion, epistaxis, falls/injuries, hematochezia, hematemesis, hematuria, hemoptysis, melena, Pain/swelling in LE, or SOB \par \par Since then he has been walking 2x a day. Has lost 25 pounds and is hoping to lose 20 more. Roughly in the 2-3 mile area. Cutting his diet. \par \par Teeth fixed with moldings, done at end of the last year. After that, there is a plan for antiarrhythmic strategy with in hospital loading.

## 2022-08-03 NOTE — CARDIOLOGY SUMMARY
[de-identified] : Cath 9/30/3021:\par Angiographic Findings\par Cardiac Arteries and Lesion Findings\par LMCA: Mild diffuse atherosclerosis\par LAD: Diffuse mild to moderate atherosclerosis till apex\par Prox LAD: 30% stenosis.Pre procedure SWATHI III flow was noted.\par LCx: Diffuse mild to moderate atherosclerosis.\par Prox CX: 30% stenosis.\par RCA: Diffuse mild to moderate atherosclerosis.\par Mid RCA: 40% stenosis 24 mm length.\par Impression\par Diagnostic Conclusions\par Non-obstructive coronary artery disease. LVEDP of 16.\par Recommendations\par Recommend aggressive medical management of CAD as per ACC/AHA guidelines\par with risk factor modification including weight loss. Patient to follow up\par closely in the office.\par Estimated Blood Loss:4ml.\par Complications:\par No complication.\par

## 2022-08-03 NOTE — ASSESSMENT
[FreeTextEntry1] : 60 year old man with Obesity, dilated cardiomyopathy with EF 40% (2017, thought to be from atrial fibrillation), HF, paroxysmal Atrial fib s/p prior WENDI cardioversion (2017), possible OMAYRA, COVID in December 2020 who presents to me today for first time for cardiac care. \par \par HFrEF 45% presumed to be from pAF \par Euvolemic on exam. Long standing cardiomyopathy with TEEs recently revealing mild LV dysfunction as well. No recent TTE for diastolic dysfunction, etc. Moderate MR present as well with mild PH. \par -continue metoprolol 50 mg XL \par -continue lisinopril 10 mg daily (increased today)\par -spironolactone discontinued, may consider SGLT-2 in future for HFpEF \par -c/w furosemide 40 mg daily \par -c/w apixaban 5 mg q12 hours\par \par CAD\par Moderate diffuse atherosclerosis\par -atorvastatin 40 mg started today \par -on DOAC, no plan for aspirin at this time\par -will measure lipids in 3 months to ensure improvement. \par \par Follow up in 3 months. \par

## 2022-08-03 NOTE — DISCUSSION/SUMMARY
[FreeTextEntry1] : 60 year old man with Obesity, dilated cardiomyopathy with EF 40% (2017, thought to be from atrial fibrillation), HF, paroxysmal Atrial fib s/p prior WENDI cardioversion (2017), possible OMAYRA, COVID in December 2020 who presents to me today for first time for cardiac care. \par \par HFrEF 45% presumed to be from pAF \par Euvolemic on exam. Long standing cardiomyopathy with TEEs recently revealing mild LV dysfunction as well. No recent TTE for diastolic dysfunction, etc. Moderate MR present as well with mild PH. \par -continue metoprolol 50 mg XL \par -continue lisinopril 10 mg daily\par -spironolactone discontinued, may consider SGLT-2 in future for HFpEF. For now, weight loss. \par -c/w furosemide 40 mg daily \par \par Atrial fibrillation\par Seems persistent at this point\par -c/w apixaban 5 mg q12 hours\par -patient to see Dr. Stevenson from EP to see if there are plans for rhythem control \par \par CAD\par Moderate diffuse atherosclerosis\par -atorvastatin 40 mg started today \par -on DOAC, no plan for aspirin at this time\par -will measure lipids in 3 months to ensure improvement. \par \par Follow up in 3 months. \par

## 2022-09-07 NOTE — HISTORY OF PRESENT ILLNESS
[FreeTextEntry1] : EC18 : Atrial fibrillation 103 bpm. \par Echo: 21 : WENDI The mitral valve leaflets appear thin and normal. Moderate (2+) mitral regurgitation is present. A saline contrast study was performed and showed no evidence of a patent foramen ovale. Thrombus seen in the left atrial appendage. Moderately reduced left ventricular systolic function. Estimated left ventricular ejection fraction is 35 %.\par 21 : TTE Endocardium is not well visualized; grossly the left ventricle appears enlarged with mild systolic dysfunction. Visual stimation of left ventricle ejection fraction is 45%. The IVC is dilated with decreased respiratory variation, suggesting\par  elevated right atrial pressure. Mild to moderate mitral regurgitation. Mild tricuspid valve regurgitation. Mild pulmonary hypertension. \par 2021:\par  Mitral Valve- The mitral valve leaflets appear thin and normal.\par  Moderate (2+) mitral regurgitation is present.\par  Aortic Valve\par  Normal aortic valve structure and function.\par  Tricuspid Valve\par  Normal appearing tricuspid valve structure and function.\par  Mild (1+) tricuspid valve regurgitation is present.\par  Pulmonic Valve\par  Normal appearing pulmonic valve structure and function.\par  Left Atrium\par  No thrombus seen in the left atrial appendage.\par  No left atrial thrombus or mass is seen.\par \par  The left ventricle is normal in size.\par  Estimated left ventricular ejection fraction is 40 %.\par \par  Right Atrium\par  Normal appearing right atrium.\par \par  Right Ventricle\par  Normal appearing right ventricle structure and function.
[FreeTextEntry1] : EC18 : Atrial fibrillation 103 bpm. \par Echo: 21 : WENDI The mitral valve leaflets appear thin and normal. Moderate (2+) mitral regurgitation is present. A saline contrast study was performed and showed no evidence of a patent foramen ovale. Thrombus seen in the left atrial appendage. Moderately reduced left ventricular systolic function. Estimated left ventricular ejection fraction is 35 %.\par 21 : TTE Endocardium is not well visualized; grossly the left ventricle appears enlarged with mild systolic dysfunction. Visual stimation of left ventricle ejection fraction is 45%. The IVC is dilated with decreased respiratory variation, suggesting\par  elevated right atrial pressure. Mild to moderate mitral regurgitation. Mild tricuspid valve regurgitation. Mild pulmonary hypertension. \par 2021:\par  Mitral Valve- The mitral valve leaflets appear thin and normal.\par  Moderate (2+) mitral regurgitation is present.\par  Aortic Valve\par  Normal aortic valve structure and function.\par  Tricuspid Valve\par  Normal appearing tricuspid valve structure and function.\par  Mild (1+) tricuspid valve regurgitation is present.\par  Pulmonic Valve\par  Normal appearing pulmonic valve structure and function.\par  Left Atrium\par  No thrombus seen in the left atrial appendage.\par  No left atrial thrombus or mass is seen.\par \par  The left ventricle is normal in size.\par  Estimated left ventricular ejection fraction is 40 %.\par \par  Right Atrium\par  Normal appearing right atrium.\par \par  Right Ventricle\par  Normal appearing right ventricle structure and function.
No

## 2022-12-01 ENCOUNTER — APPOINTMENT (OUTPATIENT)
Dept: ELECTROPHYSIOLOGY | Facility: CLINIC | Age: 62
End: 2022-12-01

## 2022-12-01 ENCOUNTER — RX RENEWAL (OUTPATIENT)
Age: 62
End: 2022-12-01

## 2022-12-13 ENCOUNTER — RX RENEWAL (OUTPATIENT)
Age: 62
End: 2022-12-13

## 2022-12-16 NOTE — PROGRESS NOTE ADULT - ASSESSMENT
1. Mild Dilated cardiomyopathy. CHF much improved.  2.Atrial fibrillation. s/p cardioversion. back in afl/afib  PLAN:  . cont xarelto lisinopril lasix aldactone  ep eval to consider antiarrythmic or ablation Alert and oriented to person, place and time

## 2023-01-01 NOTE — PRE-OP CHECKLIST - IV STARTED
[facility]   Intensive Care Unit Daily Note    Name: Cale (Male-Alton Breen   Parents: Halley and Cristobal Breen  YOB: 2025    History of Present Illness   Cale is a symmetrical SGA  male infant born at 27w2d, 14.1 oz (400 g) due to decels, minimal variability and severe growth restriction.    Patient Active Problem List   Diagnosis     Premature infant of 27 weeks gestation     Respiratory failure of      Feeding problem of       affected by IUGR     ELBW (extremely low birth weight) infant     SGA (small for gestational age)     Thrombocytopenia (H)     Direct hyperbilirubinemia     Thrombus of aorta (H)     Adrenal insufficiency (H)     Hypoglycemia     Anemia of prematurity     Metabolic bone disease of prematurity       Interval History    No acute events overnight.     Assessment & Plan     Overall Status:    5 month old  ELBW male infant born SGA at 27w2d PMA, who is now 52w1d PMA.     This patient is critically ill with respiratory failure requiring respiratory support.      Vascular Access:  LUE PICC placed on . Monitor position on XR.  Right internal jugular and EJ lines were attempted by Dr. Marsh on , but were unsuccessful.    RUE PICC (-) - malpositioned/no longer functioning  LALY PICC: placed by NNP on . Removed on .   PAL: Anesthesia placed a right radial art PAL on . Removed .  PAL removed    PICC  -   IR PICC, RLL (- removed by surgery)     SGA/IUGR: Symmetric. Prenatal course suggests placental insufficiency as etiology. Negative uCMV. HUS negative for calcifications.   - Consider Genetics consult and chromosome analysis depending on clinical course (previous child loss at [facility] on DOL 3 at 26 weeks gestation (280g)- plan to send prior to discharge when Hgb more robust.   - ROP exam (see Ophthalmology)    FEN/GI:    Vitals:    23 1600 23 1600  06/23/23 1600   Weight: 4.74 kg (10 lb 7.2 oz) 4.67 kg (10 lb 4.7 oz) 4.71 kg (10 lb 6.1 oz)     Using daily weight (since 6/15)    Growth: Symmetric SGA at birth. Moderate Protein-Calorie Malnutrition.    140 mL/kg/day; 100 kcal/kg/day  UOP: 4.2 ml/kg/hr, +stool (13 gm)    FEN/GI:  >Intraperitoneal and retroperitoneal fluid collection likely due to extravasation from LL PICC. Underwent ex lap on 5/17. Surgeon: Dr. Marsh. S/p abd wash 7 times wound vac placement 5/30, washout/wound vac change 6/2. S/p Washout and closure with mesh placement on 6/5  - Per pediatric surgery team, cow protein free formula (Pregestimil) trial started 6/10 (mother has stopped pumping)  - Anal dilations: dilate BID 8AM/PM with 12/13 dilator (initiated on 6/8) with improvement in stool output. To prn on 6/22  - Wound vac: Weekly wound vac changes bedside - last on 6/16. Next planned for 6/23.   - Meds: BID suppositories  - G tube (placed by Dr. Marsh on 5/24). Plan for button 6 weeks post-placement    Plan:  -  mL/kg/day   - Enteral: Pregestimil (initiated on 6/10); currently at 6 ml/hr (since 6/24).   - Started on erythromycin on 6/17  - Furosemide 0.5/kg/dose q8h (increased 6/1 in the setting of pleural effusion); given an additional dose on 6/24  - Diuril 20 mg/kg divided BID  - Parenteral: Custom TPN (GIR 12 AA 4 and SMOF 3.5)   - Labs: TPN labs, weekly LFT, bili M/Fri  - Needs repeat copper level in the future when inflammation improved     We have sent fecal lactoferrin, elastase, alpha fetoprotein and calprotectin on 6/13 and 6/14 for baseline values.  - Fecal lactoferrin positive. Fecal elastase >800 (normal adult value >199). Fecal calprotectin 15 (normal)    Previously  - 26 kcal/ounce MOM with sHMF for Ca/Phos (last fortified 4/30), 32 ml q3hr given over 45 min until 5/15.   - Labs: Check Ca, Mn and Zn intermittently while on TPN, GI labs for prolonged TPN can be spread out to minimize blood volume (see GI consult  note).   - Prior meds: Miralax, glycerin suppositories, erythromycin for pro-motility, scheduled simethicone  - h/o rectal irrigations TID with concerns for Hirschprung's (ruled out by rectal biopsy)    Previous GI History:  2/4 Acute decompensation with worsening respiratory distress, poor perfusion, spells and abdominal distension concerning for sepsis. NEC workup showed high CRP up to 230, hyponatremia 126, lactic acidosis and thrombocytopenia. Serial AXRs revealed possible pneumatosis but no free air. He did continue to have worsening thrombocytopenia with increasing lethargy and erythema of abdominal wall on 2/7, as well as increased fullness in scrotum with increasing fluid complexity. Decision was made to proceed with exploratory laparotomy on 2/7 which revealed closed loop bowel obstruction due to obstructed inguinal hernia, no evidence of NEC. Abdomen was kept open with [place] and subsequently closed on 2/9. He has developed a right inguinal hernia recurrence .Post-op ex lap and silo placement (2/7, Maximo) and abd wall closure (2/9), bilateral hernia repair in the context of incarcerated hernia.   2/21 Repeat ultrasound with irritability 2/21 with hernia recurrence but with adequate blood flow.  Right inguinal hernia recurrence- easily reducible.   3/10: Abd U/S: Continued diffuse echogenic distended bowel with wall thickening and hyperemia. No appreciable pneumatosis or portal venous gas. Scrotal and testicular US on the same day showed right bowel containing inguinal hernia. Perfusion by color and spectral Doppler argues against incarceration.  3/11: Abd US 1) Punctate echogenic focus in the right hepatic lobe, possibly a small calcification. 2) Continued distended bowel loops with wall thickening. 3) Distended gallbladder. No sludge or stones.  Contrast enema on 4/4: 1. No identified colonic stricture but the rectosigmoid ratio is abnormal. Consider suction biopsy if there is clinical concern for  Hirschsprung's. 2. Large, bowel containing right inguinal hernia with tapering of the bowel lumens at the deep inguinal ring  - 4/6: Upper GI and small bowel follow through - nonobstructive; slow clearance of contrast.  4/18: Rectal biopsy with ganglion cells present, negative for Hirschsprung's.     Osteopenia of Prematurity: Demineralized bones with signs of rickets. Healing proximal right femur fracture noted on 3/10 X-ray. There is also periosteal reaction in both humeri and suspicion for left ulna fracture.  - Optimize nutrition as able  - Gentle handling  - OT consult  - Alk Phos qMon until <400    Lab Results   Component Value Date    ALKPHOS 825 (H) 2023    ALKPHOS 815 (H) 2023     Respiratory: Severe BPD with minimal clamp down spells (improved over time), requiring chronic ventilation. Escalation of respiratory support overnight on 5/16 due to abdominal distension. Was on HFOV at high settings pre-operatively, ETT up sized to 3.5 on 6/12. Transitioned to conventional vent on 6/12    - Current support: Volume mode; rate 20; TV 46 ml (~10 ml/kg); PEEP 8, PS 16, T1 0.7; FiO2 0.23  - Goal: weaning PS to ~15 prior to extubation attempt  - CXR Mon/Thur; CBG AM and consider spacing if stable.  - Wean vent gradually  - Pulmozyme PRN to help mobilize any plugs  - IV Diuril 20 mg/kg/day   - Furosemide 0.5 mg/kg/dose Q8H. Extra dose 6/22  - Pulmicort restarted on 6/13    Parents are aware that Cale may need tracheostomy.    Previously  - Pulmicort nebs BID  - Xopenex nebs q12h  - NaCl gel application to the nares restarted 5/5  - Pulmonary consulted   - ENT consulted for endoscopic airway assessment (tracheomalacia, subglottic stenosis), Bronch 4/12 (see procedure note, no malacia) - recommend re-eval if this extubation trial is not successful  - Genetics consulted for genetic etiologies contributing to severe BPD, see consult note    Extubation Hx:  - Extubated 3/22-4/7  - Extubated 5/5-5/12,  re-intubated for tachypnea, increased FiO2 in setting of abdominal distention and minimal stool output    Steroid Hx:  - DART (3/16-3/26); 4/1-4/6  - methylprednisone burst (1/24-1/29 and 3/3-3/8), clinically responded  - Dexamethasone 4/1 due to most recent inflammatory episode. Stopped on 4/6 (as no improvement and irritable) - Solumedrol (5/4-5/8)    Cardiovascular: BP stable. Dopamine off since 5/31. Epinephrine off since 6/2.     - CR monitoring  - Echo 5/24: Normal cardiac function. Large echogenic area seen posterior and lateral to left ventricle, possibly representing fibrinous clot. There was no compression of the heart. Not noted on repeat echo on 5/30.    Next echo ~6/30 to assess for PPHN    Previous Hx:  PDA s/p tylenol 1/13 x 5 days  - Weekly EKGs while on erythromycin (to monitor QTc interval) - now held  - Echo: 4/28 no PDA, normal structure/function, no PPHN. No changes in pressures.   Hx: Had bradycardia needing chest compressions for ~5 min at the beginning of the procedure. Bradycardia resolved once MAP on HFOV was decreased.   Needed blood products, crystalloids, NaHCO3, dextrose boluses and calcium boluses during the procedure.    Endocrinology: Adrenal insufficiency with history of cortisol <1.  - Hydrocortisone 0.48 mg Q8H. Weaning every 3-5 days (last weaned 6/21). Holding on further weans until after extubation attempt  - He will require ACTH stim test 1-2 weeks off steroids.    Renal: JASMYNE with oliguria (5/16) --> anuria (5/17) in the setting of abdominal compartment syndrome and acute illness. Resolving. ESPERANZA (5/19) - New mild right hydronephrosis, medical renal disaese, patent arteries and veins, unchanged echogenic foci (calcifications?) bilaterally.      Creatinine   Date Value Ref Range Status   2023 0.35 0.16 - 0.39 mg/dL Final   2023 0.35 0.16 - 0.39 mg/dL Final   2023 0.36 0.16 - 0.39 mg/dL Final   2023 0.36 0.16 - 0.39 mg/dL Final   2023 0.29 0.16 -  0.39 mg/dL Final      - Monitor serial creatinine and UOP  - Follow serial ESPERANZA, next ~6/11 (hold for now)  - Minimize lasix exposure as able given nephrolithiasis and osteopenia.    ID: Currently off antibiotics  Oral thrush, improved on nystatin  - continue nystatin    Worked up for sepsis on 5/15 due to abdominal distension.  BC pos for Staph epidermidis 5/15 and 5/16. Subsequent BC neg.  UC pos for Staph epidermidis (10-50K) and Staph lugdunensis. Trach >25 PMN, Gm pos cocci. Peritoneal fluid pos for Staph epi  - Monitor CRP periodically, elevated post-op to 40 on 6/7 (7.8 on 6/12)  - Completed Vanco (5/15-6/12), ceftaz (5/15-6/12), flagyl (5/17-5/24) and micafungin (5/17-5/24).     History:    2/4 with spells, distention and pale with poor perfusion, +pneumatosis on AXR. BC Staph hominis. ETT Staph epi. Repeat BCx 2/5 and 2/6 negative. Completed 14 days of vancomycin on 2/19. Completed 7 days Gent/flagyl 2/16.    Hematology: Coagulopathy with large volumes of PRBC, FFP, Plt, and cryoprecipitate transfusion intra- and post-operatively.   Last PRBCs given 6/6.  - Monitor Hgb/plt Friday/Monday goal hgb >12, goal plts >70   - Iron supplementation- Held until feeding is established  S/p darbepoietin.     Recent Labs   Lab 06/19/23  0342   HGB 12.2     Hemoglobin   Date Value Ref Range Status   2023 12.2 10.5 - 14.0 g/dL Final   2023 13.0 10.5 - 14.0 g/dL Final   2023 14.2 (H) 10.5 - 14.0 g/dL Final     Platelet Count   Date Value Ref Range Status   2023 293 150 - 450 10e3/uL Final   2023 237 150 - 450 10e3/uL Final   2023 270 150 - 450 10e3/uL Final     Ferritin   Date Value Ref Range Status   2023 149 ng/mL Final   2023 201 ng/mL Final   2023 371 ng/mL Final     Hyperbilirubinemia/GI: Maternal blood type O+. Infant blood type O+ LEON-. Phototherapy 1/2 - 1/5. Resolved.    > Direct hyperbilirubinemia: Mother's placental pathology consistent with autoimmune process,  chronic histiocytic intervillositis. Consulted GI, concerned for DB elevation out of proportion to duration of NPO/TPN. Potential for gestational alloimmune liver disease (GALD). Received IVIG on 1/16. Now concern for GALD is much lower. Mother has had placental path done which does not suggest this possibility.   - GI consulting  - DBili, LFTs qweekly: improved 6/19  - Ursodiol on HOLD while NPO    Lab Results   Component Value Date    ALT 39 2023    AST 46 2023     (H) 2023    DBIL 0.86 (H) 2023    DBIL 1.18 (H) 2023    BILITOTAL 1.2 (H) 2023    BILITOTAL 1.7 (H) 2023       CNS: HUS DOL 3 for worsening metabolic acidosis and anemia: no intracranial hemorrhage. Repeat DOL 5 stable. 2/27: Repeat HUS at ~35-36 wks GA (eval for PVL): The ventricles are nonenlarged, however are slightly more prominent than on the 1/6/23 examination, and the extra-axial CSF subarachnoid spaces are mildly enlarged.  - Weekly OFC measurements   - Plan for MRI when clinically stable    Pain control: PACCT consulted  Fentanyl 5.1 last weaned on 6/22  Discuss with PACCT about starting methadone  Precedex 0.2 (increased 6/3): weaned 6/10. Hold at this dose and wean Fentanyl and Versed first  Narcan 1 mcg/kg/hr (started 6/1). Can increase to max of 2 per PAACT. Trial off 6/7 with worsening signs of itching   Restart gabapentin on 6/20  Versed gtt 0.1 + PRN (weaned from 0.12 on 6/24)  Melatonin at bedtime since 6/14  Vec drip discontinued 5/31    Previously:  - Gabapentin Q8 (3/21-) - increased 3/31, 4/26, 5/9  - Dr Larsen (PM&R) consulting given increased tone and irritability  - Consult integrative medicine for non-pharmacological measures    Ophthalmology: At risk for ROP due to prematurity. First ROP exam 1/31 with findings of vitreous haze bilaterally.     Last exam on 6/20: Zone 3, stage 0, follow up 3-6 months    Harm incident:  Administration contacted to address parent concerns  - [facility] consulted  - Recs: - Fast MRI to assess for brain hemorrhage              - Skeletal survey              - Assessment of Vit D status  Imaging recommendations discussed with family after they met with [facility] consult. They were reassured by the XR obtained overnight. Parents do not feel like an MRI is necessary; they were more concerned about extremity fractures based on this bone status, but do not think he needs further XR. We agreed to continue to discuss the recommendations.     : Dr. Aldana discussed with Piper from [facility]. Recommend 1)  limited upper limb and lower limb skeletal survey. 2) Endocrinology consult and 3) Genetic consult (to assess for skeletal dysplasia). We have reviewed these recommendations with parents.    Psychosocial: Social work involved.   - PMAD screening: plan for routine screening for parents at 6 months if infant remains hospitalized.     HCM and Discharge planning:   Screening tests indicated:  - [place] 24 hr - SCID+  - Repeat NMS at 14 do - normal for interpretable labs s/p transfusion. Unable to evaluate SCID due to transfusion hx  - Final repeat NMS at 30 do - normal for interpretable labs s/p transfusion. Unable to evaluate SCID due to transfusion hx. Needs f/u NBS 90 days after last PRBCs transfusion  - CCHD screen - fulfilled with Echocardiogram  - Hearing screen PTD  - Carseat trial to be done just PTD  - OT input.  - Continue standard NICU cares and family education plan.  - NICU Neurodevelopment Follow-up Clinic.    Immunizations   - Plan for Synagis administration during RSV season (<29 wk GA).  Immunization History   Administered Date(s) Administered     DTAP-IPV/HIB (PENTACEL) 2023, 2023     Hepatits B (Peds <19Y) 2023, 2023     Pneumo Conj 13-V (2010&after) 2023, 2023        Medications   Current Facility-Administered Medications   Medication     Breast Milk label for  barcode scanning 1 Bottle     budesonide (PULMICORT) neb solution 0.25 mg     chlorothiazide (DIURIL) 45 mg in sterile water (preservative free) injection     dexmedetomidine (PRECEDEX) 4 mcg/mL in sodium chloride 0.9 % 20 mL infusion PEDS     erythromycin ethylsuccinate (ERYPED) suspension 8.8 mg     fentaNYL (SUBLIMAZE) 0.05 mg/mL PEDS/NICU infusion     fentaNYL (SUBLIMAZE) 50 mcg/mL bolus from pump     furosemide (LASIX) pediatric injection 2.3 mg     gabapentin (NEURONTIN) solution 22.5 mg     glycerin (ADULT) Suppository 0.125 suppository     glycerin (PEDI-LAX) Suppository 0.125 suppository     heparin lock flush 10 UNIT/ML injection 1 mL     hydrocortisone sodium succinate (SOLU-CORTEF) 0.48 mg in NS injection PEDS/NICU     levalbuterol (XOPENEX) neb solution 0.31 mg     lipids 4 oil (SMOFLIPID) 20% for neonates (Daily dose divided into 2 doses - each infused over 10 hours)     melatonin liquid 0.5 mg     midazolam (VERSED) 1 mg/mL bolus dose from infusion pump 0.42 mg     midazolam (VERSED) 1 mg/mL in sodium chloride 0.9 % 20 mL infusion     NaCl 0.45 % with heparin 1 Units/mL infusion     naloxone (NARCAN) 0.01 mg/mL in D5W 20 mL infusion     naloxone (NARCAN) injection 0.04 mg     nystatin (MYCOSTATIN) 973563 unit/mL suspension 100,000 Units     parenteral nutrition - INFANT compounded formula     sodium chloride (PF) 0.9% PF flush 0.1-0.2 mL     sucrose (SWEET-EASE) solution 0.2-2 mL     tetracaine (PONTOCAINE) 0.5 % ophthalmic solution 1 drop        Physical Exam    GENERAL: Male infant supine in open bed.  RESPIRATORY: Breath sounds equal bilaterally.   CV: RRR, no murmur  ABDOMEN: Wound vac in place.  SKIN: Well perfused        Communications   Parents:   Name Home Phone Work Phone Mobile Phone Relationship Lgl Grd   KING NEVAREZ [phone] Father    EMERITA NEVAREZ [phone] Mother       Family lives in [place]. Had a previous 26 week IUGR son that passed away at [facility]  Children[facility] at DOL 3.   Updated on rounds    Care Conferences:   Care conference 3/15 with KR  Care conference with GI, surgery, NICU 4/26. Care conference on 4/26 with surgery, GI, PACCT, nursing, x3 neos (ME, SHAWN, KALEIGH), SW and parents. Discussed timing of feeding advancement and extubation attempt. Discussed priority is to assess fortifier tolerance in the next week, and continue to maximize fluid balance in preparation for potential extubation attempt with methylpred (instead of DART d/t [facility]) at 46-47 weeks gestation. If unable to fortify to 26 kcal/oz with sHMF will need to find another solution for Ca/Phos intake. Will trial EES to assess if motility agent is helpful. Will plan for 1 week course and discontinue if no improvement noted. PACCT to continue to maximize medications when we can fit around advancement in nutrition/extubation.     5/16: multi-disciplinary care conference with nando (Jovan), peds pulm staff (Dr. Harvey), SW, Nurse Manager, PACCT NP and primary nurse to discuss with parents their concerns about pulmonary status, potential need for tracheostomy and anticipated course, potential need for and sequence of G-tube placement and hernia repair. Parents have expressed a wish for a second opinion from a Pediatric Gastroenterologist, which we will pursue.    5/19: Magdalene Aldana and Andrew informed parents about the results of the contrast study of the PICC and our plans to perform a RCA    5/24: Dr. Aldana informed parents of the results of the RCA - that extravasation of PICC was most likely the cause of intraabdominal and retroperitoneal fluid collection on 5/16.     PCPs:   Infant PCP: Physician No Ref-Primary  Maternal OB PCP:   Information for the patient's mother:  Halley Breen [[id number]]   Coleen Wagner   M: [facility] (Jame Galindo)  Delivering Provider: Miranda  Updated 3/30; 5/22    Health Care Team:  Patient discussed with the care team. A/P, imaging studies, laboratory  data, medications and family situation reviewed.    Justina Esquivel MD     no

## 2023-01-05 NOTE — PROCEDURAL SAFETY CHECKLIST WITH OR WITHOUT SEDATION - NSTEAMANESFT_GEN_ALL_CORE
Pt care assumed. Pt states he is unable to void for ordered UA. Denies c/o's @ present.  Transfer arrangements pending     Priti Mathias RN  01/05/23 [id number] Dr. Isidro

## 2023-01-27 ENCOUNTER — RX RENEWAL (OUTPATIENT)
Age: 63
End: 2023-01-27

## 2023-02-01 ENCOUNTER — APPOINTMENT (OUTPATIENT)
Dept: CARDIOLOGY | Facility: CLINIC | Age: 63
End: 2023-02-01

## 2023-05-02 ENCOUNTER — RX RENEWAL (OUTPATIENT)
Age: 63
End: 2023-05-02

## 2023-05-04 ENCOUNTER — RX RENEWAL (OUTPATIENT)
Age: 63
End: 2023-05-04

## 2023-06-09 ENCOUNTER — RX RENEWAL (OUTPATIENT)
Age: 63
End: 2023-06-09

## 2023-07-25 ENCOUNTER — APPOINTMENT (OUTPATIENT)
Dept: CARDIOLOGY | Facility: CLINIC | Age: 63
End: 2023-07-25
Payer: COMMERCIAL

## 2023-07-25 ENCOUNTER — NON-APPOINTMENT (OUTPATIENT)
Age: 63
End: 2023-07-25

## 2023-07-25 VITALS
SYSTOLIC BLOOD PRESSURE: 152 MMHG | HEIGHT: 73 IN | DIASTOLIC BLOOD PRESSURE: 76 MMHG | WEIGHT: 315 LBS | HEART RATE: 89 BPM | BODY MASS INDEX: 41.75 KG/M2 | OXYGEN SATURATION: 96 %

## 2023-07-25 LAB
ANION GAP SERPL CALC-SCNC: 12 MMOL/L
BUN SERPL-MCNC: 14 MG/DL
CALCIUM SERPL-MCNC: 8.2 MG/DL
CHLORIDE SERPL-SCNC: 104 MMOL/L
CHOLEST SERPL-MCNC: 110 MG/DL
CO2 SERPL-SCNC: 24 MMOL/L
CREAT SERPL-MCNC: 0.89 MG/DL
EGFR: 95 ML/MIN/1.73M2
ESTIMATED AVERAGE GLUCOSE: 137 MG/DL
GLUCOSE SERPL-MCNC: 123 MG/DL
HBA1C MFR BLD HPLC: 6.4 %
HDLC SERPL-MCNC: 38 MG/DL
LDLC SERPL CALC-MCNC: 60 MG/DL
NONHDLC SERPL-MCNC: 72 MG/DL
POTASSIUM SERPL-SCNC: 4.3 MMOL/L
SODIUM SERPL-SCNC: 140 MMOL/L
TRIGL SERPL-MCNC: 51 MG/DL

## 2023-07-25 PROCEDURE — 93000 ELECTROCARDIOGRAM COMPLETE: CPT

## 2023-07-25 PROCEDURE — 99215 OFFICE O/P EST HI 40 MIN: CPT | Mod: 25

## 2023-07-25 RX ORDER — LISINOPRIL 10 MG/1
10 TABLET ORAL
Qty: 30 | Refills: 3 | Status: DISCONTINUED | COMMUNITY
Start: 2021-10-13 | End: 2023-07-25

## 2023-07-25 NOTE — CARDIOLOGY SUMMARY
[de-identified] : Cath 9/30/3021:\par Angiographic Findings\par Cardiac Arteries and Lesion Findings\par LMCA: Mild diffuse atherosclerosis\par LAD: Diffuse mild to moderate atherosclerosis till apex\par Prox LAD: 30% stenosis.Pre procedure SWATHI III flow was noted.\par LCx: Diffuse mild to moderate atherosclerosis.\par Prox CX: 30% stenosis.\par RCA: Diffuse mild to moderate atherosclerosis.\par Mid RCA: 40% stenosis 24 mm length.\par Impression\par Diagnostic Conclusions\par Non-obstructive coronary artery disease. LVEDP of 16.\par Recommendations\par Recommend aggressive medical management of CAD as per ACC/AHA guidelines\par with risk factor modification including weight loss. Patient to follow up\par closely in the office.\par Estimated Blood Loss:4ml.\par Complications:\par No complication.\par

## 2023-07-25 NOTE — DISCUSSION/SUMMARY
[FreeTextEntry1] : 60 year old man with Obesity, dilated cardiomyopathy with EF 40% (2017, thought to be from atrial fibrillation), HF, paroxysmal Atrial fib s/p prior WENDI cardioversion (2017), possible OMAYRA, COVID in December 2020 who presents to me today for first time for cardiac care. \par \par HFrEF 45% presumed to be from pAF \par Euvolemic on exam. Long standing cardiomyopathy with TEEs recently revealing mild LV dysfunction as well. No recent TTE for diastolic dysfunction, etc. Moderate MR present as well with mild PH. \par -continue metoprolol 50 mg XL \par -continue lisinopril 10 mg daily\par -spironolactone discontinued, may consider SGLT-2 in future for HFpEF. For now, weight loss. \par -c/w furosemide 40 mg daily \par \par Atrial fibrillation\par Seems persistent at this point\par -c/w apixaban 5 mg q12 hours\par -patient to see Dr. Stevenson from EP to see if there are plans for rhythem control \par \par CAD\par Moderate diffuse atherosclerosis. Lipids discussed and at goal. \par -atorvastatin 40 mg started today \par -on DOAC, no plan for aspirin at this time\par -discussed weight loss which patient has exercise plan for \par \par Follow up in 6 months. \par  [EKG obtained to assist in diagnosis and management of assessed problem(s)] : EKG obtained to assist in diagnosis and management of assessed problem(s)

## 2023-07-25 NOTE — HISTORY OF PRESENT ILLNESS
[FreeTextEntry1] : Jung Terry is a 60 year old man with Obesity, dilated cardiomyopathy with EF 40% (2017, thought to be from atrial fibrillation), HF, paroxysmal Atrial fib s/p prior WENDI cardioversion (2017), possible OMAYRA, COVID in December 2020 who presents for follow up. \par \par Had cath last year. He was found to have diffuse moderate atherosclerosis in his epicardial vessels with no obstructive lesions. \par  He denies bleeding, bruising, chest pain, confusion, epistaxis, falls/injuries, hematochezia, hematemesis, hematuria, hemoptysis, melena, Pain/swelling in LE, or SOB \par \par \par Teeth fixed with moldings, done at end of the last year. After that, there is a plan for antiarrhythmic strategy with in hospital loading- defer to EP.\par \par Regarding cardiovascular health, he has not been exercising as much and has gained weight.

## 2023-08-10 ENCOUNTER — RX RENEWAL (OUTPATIENT)
Age: 63
End: 2023-08-10

## 2023-09-29 ENCOUNTER — RX RENEWAL (OUTPATIENT)
Age: 63
End: 2023-09-29

## 2023-10-13 ENCOUNTER — RX RENEWAL (OUTPATIENT)
Age: 63
End: 2023-10-13

## 2023-10-17 ENCOUNTER — APPOINTMENT (OUTPATIENT)
Dept: ELECTROPHYSIOLOGY | Facility: CLINIC | Age: 63
End: 2023-10-17

## 2023-10-31 ENCOUNTER — LABORATORY RESULT (OUTPATIENT)
Age: 63
End: 2023-10-31

## 2023-10-31 ENCOUNTER — APPOINTMENT (OUTPATIENT)
Dept: DERMATOLOGY | Facility: CLINIC | Age: 63
End: 2023-10-31
Payer: COMMERCIAL

## 2023-10-31 DIAGNOSIS — R23.8 OTHER SKIN CHANGES: ICD-10-CM

## 2023-10-31 PROCEDURE — 99203 OFFICE O/P NEW LOW 30 MIN: CPT

## 2023-12-12 ENCOUNTER — APPOINTMENT (OUTPATIENT)
Dept: DERMATOLOGY | Facility: CLINIC | Age: 63
End: 2023-12-12

## 2023-12-15 ENCOUNTER — NON-APPOINTMENT (OUTPATIENT)
Age: 63
End: 2023-12-15

## 2023-12-28 ENCOUNTER — RX RENEWAL (OUTPATIENT)
Age: 63
End: 2023-12-28

## 2024-01-22 ENCOUNTER — RX RENEWAL (OUTPATIENT)
Age: 64
End: 2024-01-22

## 2024-02-01 ENCOUNTER — APPOINTMENT (OUTPATIENT)
Dept: DERMATOLOGY | Facility: CLINIC | Age: 64
End: 2024-02-01
Payer: COMMERCIAL

## 2024-02-01 DIAGNOSIS — R22.9 LOCALIZED SWELLING, MASS AND LUMP, UNSPECIFIED: ICD-10-CM

## 2024-02-01 PROCEDURE — 99213 OFFICE O/P EST LOW 20 MIN: CPT

## 2024-02-01 NOTE — ASSESSMENT
[FreeTextEntry1] : #Erythematous papules admixed with pustules on posterior neck and thighs/buttocks  #Erythematous pink nodules on posterior thighs bilaterally  Suspect folliculitis/fusunculosis vs. HS although unusual given typical areas (axilla, buttocks, groin) are not involved  - continue FOR 1 MORE MONTH doxycycline 100mg BID THEN STOP. SED.  - recommend OTC panoxyl wash to groin and buttocks daily. Leave on for 5 minutes prior to rinsing  - recommend dilute bleach baths. Fill bathtub with warm water and add half a cup of bleach. Soak for 15 minutes and then rinse in the shower afterward.   RTC prn

## 2024-02-01 NOTE — PHYSICAL EXAM
[FreeTextEntry3] : erythematous macules and papules on the posterior neck  large, pink, subcutaneous nodules on mid posterior thighs

## 2024-02-09 ENCOUNTER — RX RENEWAL (OUTPATIENT)
Age: 64
End: 2024-02-09

## 2024-02-09 RX ORDER — APIXABAN 5 MG/1
5 TABLET, FILM COATED ORAL
Qty: 60 | Refills: 5 | Status: ACTIVE | COMMUNITY
Start: 2022-05-12 | End: 1900-01-01

## 2024-02-20 NOTE — PRE-OP CHECKLIST - LAST DOSE WITHIN LAST 24HRS
[FreeTextEntry1] : Mr. GEMMA REIS 
Yes

## 2024-03-14 ENCOUNTER — RX RENEWAL (OUTPATIENT)
Age: 64
End: 2024-03-14

## 2024-03-27 ENCOUNTER — NON-APPOINTMENT (OUTPATIENT)
Age: 64
End: 2024-03-27

## 2024-04-03 ENCOUNTER — APPOINTMENT (OUTPATIENT)
Dept: CARDIOLOGY | Facility: CLINIC | Age: 64
End: 2024-04-03
Payer: COMMERCIAL

## 2024-04-03 VITALS
OXYGEN SATURATION: 96 % | BODY MASS INDEX: 41.75 KG/M2 | DIASTOLIC BLOOD PRESSURE: 77 MMHG | HEIGHT: 73 IN | RESPIRATION RATE: 18 BRPM | SYSTOLIC BLOOD PRESSURE: 131 MMHG | HEART RATE: 103 BPM | WEIGHT: 315 LBS

## 2024-04-03 PROCEDURE — 93000 ELECTROCARDIOGRAM COMPLETE: CPT

## 2024-04-03 PROCEDURE — 99215 OFFICE O/P EST HI 40 MIN: CPT | Mod: 25

## 2024-04-03 NOTE — DISCUSSION/SUMMARY
[FreeTextEntry1] : 60 year old man with Obesity, dilated cardiomyopathy with EF 40% (2017, thought to be from atrial fibrillation), HF, paroxysmal Atrial fib s/p prior WENDI cardioversion (2017), possible OMAYRA, COVID in December 2020 who presents to me today for first time for cardiac care.   HFrEF 45% presumed to be from pAF  Euvolemic on exam. Long standing cardiomyopathy with TEEs recently revealing mild LV dysfunction as well. No recent TTE for diastolic dysfunction, etc. Moderate MR present as well with mild PH.  -continue metoprolol 50 mg XL  -continue lisinopril 10 mg daily (increased today) -spironolactone discontinued, may consider SGLT-2 in future for HFpEF  -c/w furosemide 40 mg daily  -c/w apixaban 5 mg q12 hours -repeat TTE to evaluate EF   CAD Moderate diffuse atherosclerosis -atorvastatin 40 mg started today  -on DOAC, no plan for aspirin at this time -will measure lipids in 3 months to ensure improvement.   Morbid Obesity  Still with uprising weight  -repeat lipids, and A1c -will consider Zepbound  -sleep apnea screening   Follow up in 1 months.     [EKG obtained to assist in diagnosis and management of assessed problem(s)] : EKG obtained to assist in diagnosis and management of assessed problem(s)

## 2024-04-03 NOTE — PHYSICAL EXAM
[Well Nourished] : well nourished [Well Developed] : well developed [No Acute Distress] : no acute distress [Normal Conjunctiva] : normal conjunctiva [No Carotid Bruit] : no carotid bruit [Normal Venous Pressure] : normal venous pressure [No Rub] : no rub [Normal S1, S2] : normal S1, S2 [No Murmur] : no murmur [Clear Lung Fields] : clear lung fields [No Gallop] : no gallop [Good Air Entry] : good air entry [No Respiratory Distress] : no respiratory distress  [Soft] : abdomen soft [Non Tender] : non-tender [No Masses/organomegaly] : no masses/organomegaly [Normal Bowel Sounds] : normal bowel sounds [Normal Gait] : normal gait [No Cyanosis] : no cyanosis [No Edema] : no edema [No Clubbing] : no clubbing [No Varicosities] : no varicosities [No Rash] : no rash [No Skin Lesions] : no skin lesions [Moves all extremities] : moves all extremities [No Focal Deficits] : no focal deficits [Normal Speech] : normal speech [Alert and Oriented] : alert and oriented [Normal memory] : normal memory

## 2024-04-03 NOTE — CARDIOLOGY SUMMARY
[de-identified] : Cath 9/30/3021:\par  Angiographic Findings\par  Cardiac Arteries and Lesion Findings\par  LMCA: Mild diffuse atherosclerosis\par  LAD: Diffuse mild to moderate atherosclerosis till apex\par  Prox LAD: 30% stenosis.Pre procedure SWATHI III flow was noted.\par  LCx: Diffuse mild to moderate atherosclerosis.\par  Prox CX: 30% stenosis.\par  RCA: Diffuse mild to moderate atherosclerosis.\par  Mid RCA: 40% stenosis 24 mm length.\par  Impression\par  Diagnostic Conclusions\par  Non-obstructive coronary artery disease. LVEDP of 16.\par  Recommendations\par  Recommend aggressive medical management of CAD as per ACC/AHA guidelines\par  with risk factor modification including weight loss. Patient to follow up\par  closely in the office.\par  Estimated Blood Loss:4ml.\par  Complications:\par  No complication.\par

## 2024-04-03 NOTE — HISTORY OF PRESENT ILLNESS
[FreeTextEntry1] : Jung Terry is a 60 year old man with morbid obesity, dilated cardiomyopathy with EF 40% (2017, thought to be from atrial fibrillation), HF, paroxysmal Atrial fib s/p prior WENDI cardioversion (2017), possible OMAYRA, COVID in December 2020 who presents for follow up.   Had cath last year. He was found to have diffuse moderate atherosclerosis in his epicardial vessels with no obstructive lesions.   He denies bleeding, bruising, chest pain, confusion, epistaxis, falls/injuries, hematochezia, hematemesis, hematuria, hemoptysis, melena, Pain/swelling in LE, or SOB   Teeth fixed with moldings, done at end of the last year. After that, there is a plan for antiarrhythmic strategy with in hospital loading- defer to EP.  Regarding cardiovascular health, he has not been exercising as much and has gained weight. He reports more SOB recently [place] the airport.  Lasix 40 mg usually, but since then took it BID. It did help. No PND, waking up from urination.

## 2024-04-04 ENCOUNTER — NON-APPOINTMENT (OUTPATIENT)
Age: 64
End: 2024-04-04

## 2024-04-04 ENCOUNTER — APPOINTMENT (OUTPATIENT)
Dept: ELECTROPHYSIOLOGY | Facility: CLINIC | Age: 64
End: 2024-04-04
Payer: COMMERCIAL

## 2024-04-04 VITALS
WEIGHT: 315 LBS | RESPIRATION RATE: 20 BRPM | SYSTOLIC BLOOD PRESSURE: 142 MMHG | DIASTOLIC BLOOD PRESSURE: 87 MMHG | BODY MASS INDEX: 41.75 KG/M2 | HEART RATE: 90 BPM | HEIGHT: 73 IN | OXYGEN SATURATION: 96 %

## 2024-04-04 DIAGNOSIS — I48.91 UNSPECIFIED ATRIAL FIBRILLATION: ICD-10-CM

## 2024-04-04 DIAGNOSIS — I50.20 UNSPECIFIED SYSTOLIC (CONGESTIVE) HEART FAILURE: ICD-10-CM

## 2024-04-04 PROCEDURE — G2211 COMPLEX E/M VISIT ADD ON: CPT | Mod: NC,1L

## 2024-04-04 PROCEDURE — 99213 OFFICE O/P EST LOW 20 MIN: CPT | Mod: 25

## 2024-04-04 PROCEDURE — 93000 ELECTROCARDIOGRAM COMPLETE: CPT

## 2024-04-05 ENCOUNTER — OUTPATIENT (OUTPATIENT)
Dept: OUTPATIENT SERVICES | Facility: HOSPITAL | Age: 64
LOS: 1 days | End: 2024-04-05
Payer: COMMERCIAL

## 2024-04-05 DIAGNOSIS — Z90.89 ACQUIRED ABSENCE OF OTHER ORGANS: Chronic | ICD-10-CM

## 2024-04-05 DIAGNOSIS — I51.9 HEART DISEASE, UNSPECIFIED: ICD-10-CM

## 2024-04-05 PROCEDURE — 93306 TTE W/DOPPLER COMPLETE: CPT

## 2024-04-05 PROCEDURE — 93306 TTE W/DOPPLER COMPLETE: CPT | Mod: 26

## 2024-04-06 DIAGNOSIS — I51.9 HEART DISEASE, UNSPECIFIED: ICD-10-CM

## 2024-04-10 LAB
ALBUMIN SERPL ELPH-MCNC: 3.7 G/DL
ALP BLD-CCNC: 80 U/L
ALT SERPL-CCNC: 19 U/L
ANION GAP SERPL CALC-SCNC: 12 MMOL/L
AST SERPL-CCNC: 21 U/L
BILIRUB SERPL-MCNC: 0.7 MG/DL
BUN SERPL-MCNC: 12 MG/DL
CALCIUM SERPL-MCNC: 8.3 MG/DL
CHLORIDE SERPL-SCNC: 99 MMOL/L
CHOLEST SERPL-MCNC: 107 MG/DL
CO2 SERPL-SCNC: 27 MMOL/L
CREAT SERPL-MCNC: 1 MG/DL
EGFR: 84 ML/MIN/1.73M2
ESTIMATED AVERAGE GLUCOSE: 143 MG/DL
GLUCOSE SERPL-MCNC: 122 MG/DL
HBA1C MFR BLD HPLC: 6.6 %
HDLC SERPL-MCNC: 37 MG/DL
LDLC SERPL CALC-MCNC: 57 MG/DL
NONHDLC SERPL-MCNC: 70 MG/DL
NT-PROBNP SERPL-MCNC: 855 PG/ML
POTASSIUM SERPL-SCNC: 4.4 MMOL/L
PROT SERPL-MCNC: 6.7 G/DL
SODIUM SERPL-SCNC: 138 MMOL/L
TRIGL SERPL-MCNC: 56 MG/DL

## 2024-04-14 ENCOUNTER — OUTPATIENT (OUTPATIENT)
Dept: OUTPATIENT SERVICES | Facility: HOSPITAL | Age: 64
LOS: 1 days | End: 2024-04-14
Payer: COMMERCIAL

## 2024-04-14 DIAGNOSIS — Z90.89 ACQUIRED ABSENCE OF OTHER ORGANS: Chronic | ICD-10-CM

## 2024-04-14 DIAGNOSIS — G47.33 OBSTRUCTIVE SLEEP APNEA (ADULT) (PEDIATRIC): ICD-10-CM

## 2024-04-14 PROCEDURE — 95810 POLYSOM 6/> YRS 4/> PARAM: CPT

## 2024-04-22 ENCOUNTER — NON-APPOINTMENT (OUTPATIENT)
Age: 64
End: 2024-04-22

## 2024-04-23 ENCOUNTER — TRANSCRIPTION ENCOUNTER (OUTPATIENT)
Age: 64
End: 2024-04-23

## 2024-04-24 ENCOUNTER — APPOINTMENT (OUTPATIENT)
Dept: CARDIOLOGY | Facility: CLINIC | Age: 64
End: 2024-04-24
Payer: COMMERCIAL

## 2024-04-24 VITALS
HEART RATE: 99 BPM | HEIGHT: 73 IN | BODY MASS INDEX: 41.75 KG/M2 | RESPIRATION RATE: 18 BRPM | SYSTOLIC BLOOD PRESSURE: 137 MMHG | DIASTOLIC BLOOD PRESSURE: 79 MMHG | WEIGHT: 315 LBS | OXYGEN SATURATION: 92 %

## 2024-04-24 PROCEDURE — 99215 OFFICE O/P EST HI 40 MIN: CPT | Mod: 25

## 2024-04-24 PROCEDURE — 93000 ELECTROCARDIOGRAM COMPLETE: CPT

## 2024-04-24 RX ORDER — LOSARTAN POTASSIUM 25 MG/1
25 TABLET, FILM COATED ORAL DAILY
Qty: 90 | Refills: 1 | Status: ACTIVE | COMMUNITY
Start: 2024-04-24 | End: 1900-01-01

## 2024-04-24 RX ORDER — LISINOPRIL 10 MG/1
10 TABLET ORAL DAILY
Qty: 90 | Refills: 0 | Status: DISCONTINUED | COMMUNITY
Start: 2022-12-01 | End: 2024-04-24

## 2024-04-24 RX ORDER — DOXYCYCLINE HYCLATE 100 MG/1
100 TABLET ORAL
Qty: 60 | Refills: 3 | Status: DISCONTINUED | COMMUNITY
Start: 2023-10-31 | End: 2024-04-24

## 2024-04-24 NOTE — DISCUSSION/SUMMARY
[FreeTextEntry1] : 60 year old man with Obesity, dilated cardiomyopathy with EF 40% (2017, thought to be from atrial fibrillation), HF, paroxysmal Atrial fib s/p prior WENDI cardioversion (2017), possible OMAYRA, COVID in December 2020 who presents to me today for first time for cardiac care.   HFrEF 45% presumed to be from pAF  Now further reduced  Euvolemic on exam. Long standing cardiomyopathy with TEEs recently revealing mild LV dysfunction as well. Moderate-severe MR.  -continue metoprolol 50 mg XL  -discontinue lisinopril start Losartan 25mg daily in preparation for need to transition to entresto in near future -spironolactone discontinued, may consider SGLT-2 (farixiga)  in future for HFrEF  -c/w furosemide 40 mg BID- patient taking daily  -c/w apixaban 5 mg q12 hours- patient to hold 48 hrs prior to LHC  -TTE 4/2024: further reduction in EF to 30-35%, moderate-severe MR. Dilated IVC.   CAD Moderate diffuse atherosclerosis -c/w atorvastatin 40 mg  -on DOAC, no plan for aspirin at this time -will measure lipids in 3 months to ensure improvement.  -plan for C/RHC to further evaluate 4/29/24  Morbid Obesity  Still with uprising weight  - a1c uptrending 6.6  -will consider Zepbound  -positive sleep study for OSA_ CPAP delivery pending   Follow up in 1 months.     [EKG obtained to assist in diagnosis and management of assessed problem(s)] : EKG obtained to assist in diagnosis and management of assessed problem(s)

## 2024-04-24 NOTE — HISTORY OF PRESENT ILLNESS
[FreeTextEntry1] : Jung Terry is a 60 year old man with morbid obesity, dilated cardiomyopathy with EF 40% (2017, thought to be from atrial fibrillation), HF, paroxysmal Atrial fib s/p prior WENDI cardioversion (2017), possible OMAYRA, COVID in December 2020 who presents for follow up.   Had cath last year. He was found to have diffuse moderate atherosclerosis in his epicardial vessels with no obstructive lesions.   He denies bleeding, bruising, chest pain, confusion, epistaxis, falls/injuries, hematochezia, hematemesis, hematuria, hemoptysis, melena, Pain/swelling in LE, or SOB   Teeth fixed with moldings, done at end of the last year. After that, there is a plan for antiarrhythmic strategy with in hospital loading- defer to EP.  Regarding cardiovascular health, he has not been exercising as much and has gained weight. He reports more SOB recently with [place] the airport.  Lasix 40 mg usually, but since then took it BID. It did help. No PND, waking up from urination.   TTE 4/2024: further reduction in EF to 30-35%, moderate-severe MR. Dilated IVC.  LDL 57, .  A1C 6.6  Today patient returning for follow post TTE and sleep study - feels tired and SOB, frustrated with not seeing weight loss despite counting calories and cutting portion. Reported sleep study, failed, CPAP machine pending delivery. complaint with medications; inconsistent with lasix due to increased urination and interferes with social; does take once a day instead of 2x day;  reports PSA elevated to 5.6 urology evaluation pending  PCP recommended bariatric surgery eval; unable to get weight loss meds approved.  patient experiencing GREY, sleeping in recliner sometimes.

## 2024-04-24 NOTE — CARDIOLOGY SUMMARY
[de-identified] : 4/24/24: AFIB unchanged from prior  [de-identified] : TTE 4/2024: further reduction in EF to 30-35%, moderate-severe MR. Dilated IVC.  [de-identified] : Cath 9/30/3021:\par  Angiographic Findings\par  Cardiac Arteries and Lesion Findings\par  LMCA: Mild diffuse atherosclerosis\par  LAD: Diffuse mild to moderate atherosclerosis till apex\par  Prox LAD: 30% stenosis.Pre procedure SWATHI III flow was noted.\par  LCx: Diffuse mild to moderate atherosclerosis.\par  Prox CX: 30% stenosis.\par  RCA: Diffuse mild to moderate atherosclerosis.\par  Mid RCA: 40% stenosis 24 mm length.\par  Impression\par  Diagnostic Conclusions\par  Non-obstructive coronary artery disease. LVEDP of 16.\par  Recommendations\par  Recommend aggressive medical management of CAD as per ACC/AHA guidelines\par  with risk factor modification including weight loss. Patient to follow up\par  closely in the office.\par  Estimated Blood Loss:4ml.\par  Complications:\par  No complication.\par

## 2024-04-26 RX ORDER — LISINOPRIL 2.5 MG/1
1 TABLET ORAL
Qty: 0 | Refills: 0 | DISCHARGE

## 2024-04-26 RX ORDER — POTASSIUM CHLORIDE 20 MEQ
1 PACKET (EA) ORAL
Qty: 0 | Refills: 0 | DISCHARGE

## 2024-04-26 NOTE — H&P ADULT - PROBLEM SELECTOR PLAN 1
a/w fatigue and SOB   -plan for cardiac cath for ischemic work up  - CHRIS protocol held 2/2 reduced EF  -Consent obtained for cardiac catheterization w/ coronary angiogram and possible stent placement, with possible sedation and analgia. Pt is competent, has capacity, and understands risks and benefits of procedure. Risks and benefits discussed. Risk discussed included, but not limited to MI, stroke, mortality, major bleeding, arrythmia, or infection. All questions answered

## 2024-04-26 NOTE — CHART NOTE - NSCHARTNOTEFT_GEN_A_CORE
Preop Phone Call:  - Able to Reach Patient:    - Info given to:	patient having cardiac catheterization  -  and allergies confirmed: yes  - Medication Information Given: yes  - Medications To Take (specify)	Ok to take a.m. meds w/sip of water  - Medications To Hold (Specify)	eliquis last dose  AM   - Arrival Time: 0815  - NPO after:	0000  - Understanding of Information Verbalized: yes  will have a  over the age of 18  for d/c home  - Understanding of possible overnight stay if stent is placed: yes

## 2024-04-26 NOTE — H&P ADULT - NSHPLABSRESULTS_GEN_ALL_CORE
< from: TTE Echo Complete w/o Contrast w/ Doppler (04.05.24 @ 15:22) >     Impression     Summary     Mild concentric left ventricular hypertrophy is present.   Moderate to severely reduced left ventricular systolic function.   Estimated left ventricular ejection fraction is 30-35 % with global   hypokinesis.   Unable to assess diastolic function due to atrial fibrillation.   The right ventricle is normalin size, wall thickness, wall motion, and   contractility based on TAPSE.   The left atrium is moderately dilated.   The right atrium appears mildly dilated.   Moderate to severe mitral regurgitation is present.   Moderate to severe tricuspid valve regurgitation is present.   Mild pulmonary hypertension.     Signature     ----------------------------------------------------------------   Electronically signed by Zamzam Barcenas MD(Interpreting   physician) on 04/08/2024 10:08 AM   ----------------------------------------------------------------    < end of copied text >    4/3/24 EKG AFIB

## 2024-04-26 NOTE — H&P ADULT - NSHPROSALLOTHERNEGRD_GEN_ALL_CORE
Problem: Hemodialysis  Goal: Fistula/graft intact as evidenced by presence of bruit & thrill  Outcome: Outcome Met, Continue evaluating goal progress toward completion     Problem: Hemodialysis  Goal: Dialysis: Safe, effective, and comfortable hemodialysis treatment (Hemodialysis nurse only)  Outcome: Outcome Met, Continue evaluating goal progress toward completion     Problem: Hemodialysis  Goal: Dialysis: Free of complications related to initiation/termination of dialysis (Hemodialysis nurse only)  Outcome: Outcome Met, Continue evaluating goal progress toward completion   HD completed w/o incident. 1.5L net fluid removed. AVF required multiple sticks at venous site. Able to achieved bfr of 300, but elevated pressures throughout using 15g needles. MD aware.    All other review of systems negative, except as noted in HPI

## 2024-04-26 NOTE — H&P ADULT - NSHPPHYSICALEXAM_GEN_ALL_CORE
GENERAL: NAD, well-groomed, well-developed  HEAD:  Atraumatic, Normocephalic  EYES: EOMI, PERRLA, conjunctiva and sclera clear  ENMT: No tonsillar erythema, exudates, or enlargement; Moist mucous membranes, Good dentition, No lesions  NECK: Supple, No JVD, Normal thyroid  NERVOUS SYSTEM:  Alert & Oriented X3, Good concentration; Motor Strength 5/5 B/L upper and lower extremities; DTRs 2+ intact and symmetric  CHEST/LUNG: Clear to auscultation bilaterally; No rales, rhonchi, wheezing, or rubs  HEART:- Irregular rate and rhythm;  afib No murmurs, rubs, or gallops  ABDOMEN: Soft, Nontender, Nondistended; Bowel sounds present  EXTREMITIES:  + Peripheral Pulses, +3 LE edema  LYMPH: No lymphadenopathy noted  SKIN: No rashes or lesions

## 2024-04-26 NOTE — H&P ADULT - ASSESSMENT
64 y/o M with PMHx of dilated cardiomyopathy EF 35%, AFIB on eliquis last dose 4/27, OMAYRA, obesity presented to cardiology with c/o fatigue and SOB. Recent ECHO showed EF reduction from 40 to 35% with moderate to severe MR. .  Referred for right and left heart cath to further evaluate.     ASA class:  Creatinine:  GFR:  Bleeding  Risk score:  Jw Score:   This is a 64 y/o M with PM Hx of dilated cardiomyopathy EF 35%, AFIB on eliquis last dose 4/27, OMAYRA, obesity presented to cardiology with c/o fatigue and SOB. Recent ECHO showed EF reduction from 40 to 35% with moderate to severe MR. .  Referred for right and left heart cath to further evaluate.     ASA class:III  Creatinine:1.00  GFR:-80  Bleeding  Risk score:  Jw Score:   This is a 64 y/o M with PM Hx of dilated cardiomyopathy EF 35%, AFIB on eliquis last dose 4/27, OMARYA, obesity presented to cardiology with c/o fatigue and SOB. Recent ECHO showed EF reduction from 40 to 35% with moderate to severe MR. .  Referred for right and left heart cath to further evaluate.     ASA class:III  Creatinine:1.00  GFR:-80  Bleeding  Risk score:0.7  Jw Score: 5

## 2024-04-26 NOTE — H&P ADULT - HISTORY OF PRESENT ILLNESS
62 y/o M with PMHx of dilated cardiomyopathy EF 35%, AFIB on eliquis last dose 4/27, OMAYRA, obesity presented to cardiology with c/o fatigue and SOB. Recent ECHO showed EF reduction from 40 to 35% with moderate to severe MR. .  Referred for right and left heart cath to further evaluate.   This is a 62 y/o M with PM Hx of dilated cardiomyopathy EF 35%, AFIB on Eliquis last dose 4/27, OMAYRA, obesity presented to cardiology with c/o fatigue and SOB. Recent ECHO showed EF reduction from 40 to 35% with moderate to severe MR. .  Referred for right and left heart cath to further evaluate.

## 2024-04-29 ENCOUNTER — TRANSCRIPTION ENCOUNTER (OUTPATIENT)
Age: 64
End: 2024-04-29

## 2024-04-29 ENCOUNTER — OUTPATIENT (OUTPATIENT)
Dept: OUTPATIENT SERVICES | Facility: HOSPITAL | Age: 64
LOS: 1 days | Discharge: HOME | End: 2024-04-29
Payer: COMMERCIAL

## 2024-04-29 VITALS
OXYGEN SATURATION: 95 % | DIASTOLIC BLOOD PRESSURE: 99 MMHG | RESPIRATION RATE: 17 BRPM | HEART RATE: 87 BPM | SYSTOLIC BLOOD PRESSURE: 130 MMHG

## 2024-04-29 VITALS
SYSTOLIC BLOOD PRESSURE: 129 MMHG | HEART RATE: 95 BPM | HEIGHT: 71 IN | RESPIRATION RATE: 16 BRPM | WEIGHT: 315 LBS | TEMPERATURE: 98 F | OXYGEN SATURATION: 94 % | DIASTOLIC BLOOD PRESSURE: 67 MMHG

## 2024-04-29 DIAGNOSIS — Z90.89 ACQUIRED ABSENCE OF OTHER ORGANS: Chronic | ICD-10-CM

## 2024-04-29 DIAGNOSIS — I50.20 UNSPECIFIED SYSTOLIC (CONGESTIVE) HEART FAILURE: ICD-10-CM

## 2024-04-29 LAB
ANION GAP SERPL CALC-SCNC: 3 MMOL/L — LOW (ref 5–17)
BUN SERPL-MCNC: 15 MG/DL — SIGNIFICANT CHANGE UP (ref 7–23)
CALCIUM SERPL-MCNC: 8.2 MG/DL — LOW (ref 8.5–10.1)
CHLORIDE SERPL-SCNC: 105 MMOL/L — SIGNIFICANT CHANGE UP (ref 96–108)
CO2 SERPL-SCNC: 29 MMOL/L — SIGNIFICANT CHANGE UP (ref 22–31)
CREAT SERPL-MCNC: 0.93 MG/DL — SIGNIFICANT CHANGE UP (ref 0.5–1.3)
EGFR: 91 ML/MIN/1.73M2 — SIGNIFICANT CHANGE UP
GLUCOSE SERPL-MCNC: 110 MG/DL — HIGH (ref 70–99)
HCT VFR BLD CALC: 41.3 % — SIGNIFICANT CHANGE UP (ref 39–50)
HGB BLD-MCNC: 12.7 G/DL — LOW (ref 13–17)
MCHC RBC-ENTMCNC: 29.1 PG — SIGNIFICANT CHANGE UP (ref 27–34)
MCHC RBC-ENTMCNC: 30.8 GM/DL — LOW (ref 32–36)
MCV RBC AUTO: 94.7 FL — SIGNIFICANT CHANGE UP (ref 80–100)
NT-PROBNP SERPL-SCNC: 664 PG/ML — HIGH (ref 0–125)
PLATELET # BLD AUTO: 281 K/UL — SIGNIFICANT CHANGE UP (ref 150–400)
POTASSIUM SERPL-MCNC: 3.7 MMOL/L — SIGNIFICANT CHANGE UP (ref 3.5–5.3)
POTASSIUM SERPL-SCNC: 3.7 MMOL/L — SIGNIFICANT CHANGE UP (ref 3.5–5.3)
RBC # BLD: 4.36 M/UL — SIGNIFICANT CHANGE UP (ref 4.2–5.8)
RBC # FLD: 16.5 % — HIGH (ref 10.3–14.5)
SODIUM SERPL-SCNC: 137 MMOL/L — SIGNIFICANT CHANGE UP (ref 135–145)
WBC # BLD: 10.03 K/UL — SIGNIFICANT CHANGE UP (ref 3.8–10.5)
WBC # FLD AUTO: 10.03 K/UL — SIGNIFICANT CHANGE UP (ref 3.8–10.5)

## 2024-04-29 PROCEDURE — C1769: CPT

## 2024-04-29 PROCEDURE — C1894: CPT

## 2024-04-29 PROCEDURE — 0523T NTRAPX C FFR W/3D FUNCJL MAP: CPT

## 2024-04-29 PROCEDURE — 93460 R&L HRT ART/VENTRICLE ANGIO: CPT

## 2024-04-29 PROCEDURE — 93460 R&L HRT ART/VENTRICLE ANGIO: CPT | Mod: 26

## 2024-04-29 PROCEDURE — 82810 BLOOD GASES O2 SAT ONLY: CPT

## 2024-04-29 PROCEDURE — 85027 COMPLETE CBC AUTOMATED: CPT

## 2024-04-29 PROCEDURE — 80048 BASIC METABOLIC PNL TOTAL CA: CPT

## 2024-04-29 PROCEDURE — 82803 BLOOD GASES ANY COMBINATION: CPT

## 2024-04-29 PROCEDURE — 36415 COLL VENOUS BLD VENIPUNCTURE: CPT

## 2024-04-29 PROCEDURE — 83880 ASSAY OF NATRIURETIC PEPTIDE: CPT

## 2024-04-29 PROCEDURE — C1887: CPT

## 2024-04-29 PROCEDURE — 99152 MOD SED SAME PHYS/QHP 5/>YRS: CPT

## 2024-04-29 RX ORDER — DILTIAZEM HCL 120 MG
300 CAPSULE, EXT RELEASE 24 HR ORAL
Qty: 0 | Refills: 0 | DISCHARGE

## 2024-04-29 RX ORDER — SODIUM CHLORIDE 9 MG/ML
1000 INJECTION INTRAMUSCULAR; INTRAVENOUS; SUBCUTANEOUS
Refills: 0 | Status: DISCONTINUED | OUTPATIENT
Start: 2024-04-29 | End: 2024-04-29

## 2024-04-29 RX ORDER — BUMETANIDE 0.25 MG/ML
1 INJECTION INTRAMUSCULAR; INTRAVENOUS
Qty: 30 | Refills: 3
Start: 2024-04-29 | End: 2024-08-18

## 2024-04-29 RX ORDER — BUMETANIDE 0.25 MG/ML
1 INJECTION INTRAMUSCULAR; INTRAVENOUS
Qty: 30 | Refills: 3
Start: 2024-04-29 | End: 2024-08-26

## 2024-04-29 RX ORDER — DAPAGLIFLOZIN 10 MG/1
1 TABLET, FILM COATED ORAL
Qty: 30 | Refills: 3
Start: 2024-04-29 | End: 2024-08-26

## 2024-04-29 NOTE — ASU DISCHARGE PLAN (ADULT/PEDIATRIC) - COMMENTS
Follow up with Dr Aragon in one week at [facility], you also have an appointment with EP doctor Graham at this time.

## 2024-04-29 NOTE — ASU DISCHARGE PLAN (ADULT/PEDIATRIC) - PATIENT BELONGINGS
I have personally seen and examined this patient.  I have fully participated in the care of this patient. I have reviewed all pertinent clinical information, including history, physical exam, plan and the Resident’s note and agree except as noted.
Patient's belongings returned

## 2024-04-29 NOTE — PROGRESS NOTE ADULT - SUBJECTIVE AND OBJECTIVE BOX
Cardiology NP     Patient is a 63y old  Male who presents with a chief complaint of Patient c/o SOB on exertion with LE edema , HFrEF (26 Apr 2024 12:54)      HPI:   This is a 62 y/o M with PM Hx of dilated cardiomyopathy EF 35%, AFIB on Eliquis last dose 4/27, OMAYRA, obesity presented to cardiology with c/o fatigue and SOB. Recent ECHO showed EF reduction from 40 to 35% with moderate to severe MR. .  Referred for right and left heart cath to further evaluate.  (26 Apr 2024 12:54)      PAST MEDICAL & SURGICAL HISTORY:  Obesity    Afib    HTN (hypertension)    No significant past surgical history    History of tonsillectomy        MEDICATIONS  (STANDING):    MEDICATIONS  (PRN):      Allergies    No Known Allergies    Intolerances        REVIEW OF SYSTEMS: As mentioned in HPI all others Negative     Vital Signs Last 24 Hrs  T(C): 36.6 (29 Apr 2024 08:46), Max: 36.6 (29 Apr 2024 08:46)  T(F): 97.9 (29 Apr 2024 08:46), Max: 97.9 (29 Apr 2024 08:46)  HR: 106 (29 Apr 2024 10:20) (95 - 108)  BP: 134/87 (29 Apr 2024 10:30) (129/67 - 146/86)  BP(mean): --  RR: 17 (29 Apr 2024 10:30) (16 - 17)  SpO2: 93% (29 Apr 2024 10:30) (92% - 94%)    Parameters below as of 29 Apr 2024 10:30  Patient On (Oxygen Delivery Method): room air        PHYSICAL EXAM:  NERVOUS SYSTEM:  Alert & Oriented X3, Good concentration  CHEST/LUNG: Clear to auscultation bilaterally; No rales, rhonchi, wheezing, or rubs  HEART: Regular rate and rhythm; No murmurs, rubs, or gallops  ABDOMEN: Soft, Nontender, Nondistended; Bowel sounds present  EXTREMITIES:  2+ Peripheral Pulses, No clubbing, cyanosis, or edema  SKIN: No rashes or lesions    LABS:                        12.7   10.03 )-----------( 281      ( 29 Apr 2024 08:55 )             41.3     04-29    137  |  105  |  15  ----------------------------<  110<H>  3.7   |  29  |  0.93    Ca    8.2<L>      29 Apr 2024 09:40

## 2024-04-29 NOTE — PACU DISCHARGE NOTE - COMMENTS
pt discharged to home belongings with patient. post cath discharge instructions rendered via teach back. pt status at baseline.  pt transported via wheelchair

## 2024-04-29 NOTE — PROGRESS NOTE ADULT - ASSESSMENT
Patient now s/p angiogram that revealed nonobstructive disease , elevated EDP of 35.   - medical management  - stop Lasix and Cardizem, start Farxiga 10mg daily and Bumex 2mg daily   - DC home  - f/u with Dr Aragon in 1 week and EP

## 2024-04-29 NOTE — ASU DISCHARGE PLAN (ADULT/PEDIATRIC) - CARE PROVIDER_API CALL
Irvin Aragon  Interventional Cardiology  [address], [address]  [place] [address]  Phone: [phone]  Fax: [phone]  Follow Up Time:

## 2024-04-30 DIAGNOSIS — I25.10 ATHEROSCLEROTIC HEART DISEASE OF NATIVE CORONARY ARTERY WITHOUT ANGINA PECTORIS: ICD-10-CM

## 2024-04-30 DIAGNOSIS — I50.21 ACUTE SYSTOLIC (CONGESTIVE) HEART FAILURE: ICD-10-CM

## 2024-04-30 NOTE — POST DISCHARGE NOTE - DETAILS:
Post procedure phone call completed; patient understood all discharge paperwork. No questions regarding medications or pain management. MD follow up appointment made. Patient was able to rest when they were discharged. Patient will recommend [facility], no complaints of hospital stay, satisfied with care. Instructed patient to contact provider with any further questions or concerns.

## 2024-05-07 ENCOUNTER — APPOINTMENT (OUTPATIENT)
Dept: ELECTROPHYSIOLOGY | Facility: CLINIC | Age: 64
End: 2024-05-07
Payer: COMMERCIAL

## 2024-05-07 ENCOUNTER — APPOINTMENT (OUTPATIENT)
Dept: SURGERY | Facility: CLINIC | Age: 64
End: 2024-05-07
Payer: COMMERCIAL

## 2024-05-07 ENCOUNTER — NON-APPOINTMENT (OUTPATIENT)
Age: 64
End: 2024-05-07

## 2024-05-07 VITALS
TEMPERATURE: 97.5 F | HEIGHT: 73 IN | SYSTOLIC BLOOD PRESSURE: 138 MMHG | HEART RATE: 105 BPM | OXYGEN SATURATION: 96 % | DIASTOLIC BLOOD PRESSURE: 90 MMHG | BODY MASS INDEX: 41.75 KG/M2 | WEIGHT: 315 LBS | RESPIRATION RATE: 17 BRPM

## 2024-05-07 VITALS — SYSTOLIC BLOOD PRESSURE: 140 MMHG | DIASTOLIC BLOOD PRESSURE: 100 MMHG

## 2024-05-07 DIAGNOSIS — Z13.228 ENCOUNTER FOR SCREENING FOR OTHER SUSPECTED ENDOCRINE DISORDER: ICD-10-CM

## 2024-05-07 DIAGNOSIS — Z80.1 FAMILY HISTORY OF MALIGNANT NEOPLASM OF TRACHEA, BRONCHUS AND LUNG: ICD-10-CM

## 2024-05-07 DIAGNOSIS — Z13.0 ENCOUNTER FOR SCREENING FOR OTHER SUSPECTED ENDOCRINE DISORDER: ICD-10-CM

## 2024-05-07 DIAGNOSIS — I51.9 HEART DISEASE, UNSPECIFIED: ICD-10-CM

## 2024-05-07 DIAGNOSIS — G47.30 SLEEP APNEA, UNSPECIFIED: ICD-10-CM

## 2024-05-07 DIAGNOSIS — I10 ESSENTIAL (PRIMARY) HYPERTENSION: ICD-10-CM

## 2024-05-07 DIAGNOSIS — Z83.49 FAMILY HISTORY OF OTHER ENDOCRINE, NUTRITIONAL AND METABOLIC DISEASES: ICD-10-CM

## 2024-05-07 DIAGNOSIS — Z13.29 ENCOUNTER FOR SCREENING FOR OTHER SUSPECTED ENDOCRINE DISORDER: ICD-10-CM

## 2024-05-07 DIAGNOSIS — Z13.21 ENCOUNTER FOR SCREENING FOR OTHER SUSPECTED ENDOCRINE DISORDER: ICD-10-CM

## 2024-05-07 DIAGNOSIS — Z83.3 FAMILY HISTORY OF DIABETES MELLITUS: ICD-10-CM

## 2024-05-07 PROCEDURE — G2211 COMPLEX E/M VISIT ADD ON: CPT | Mod: NC,1L

## 2024-05-07 PROCEDURE — 93000 ELECTROCARDIOGRAM COMPLETE: CPT

## 2024-05-07 PROCEDURE — 99215 OFFICE O/P EST HI 40 MIN: CPT | Mod: 25

## 2024-05-07 PROCEDURE — 99205 OFFICE O/P NEW HI 60 MIN: CPT

## 2024-05-07 RX ORDER — FUROSEMIDE 40 MG/1
40 TABLET ORAL TWICE DAILY
Refills: 0 | Status: DISCONTINUED | COMMUNITY
End: 2024-05-07

## 2024-05-07 RX ORDER — TIRZEPATIDE 2.5 MG/.5ML
2.5 INJECTION, SOLUTION SUBCUTANEOUS
Qty: 4 | Refills: 0 | Status: DISCONTINUED | COMMUNITY
Start: 2024-04-03 | End: 2024-05-07

## 2024-05-07 RX ORDER — COVID-19 TEST SPECIMEN COLLECT
MISCELLANEOUS MISCELLANEOUS
Qty: 1 | Refills: 0 | Status: DISCONTINUED | COMMUNITY
Start: 2020-12-22 | End: 2024-05-07

## 2024-05-07 RX ORDER — DILTIAZEM HYDROCHLORIDE 300 MG/1
300 CAPSULE, EXTENDED RELEASE ORAL
Qty: 30 | Refills: 5 | Status: DISCONTINUED | COMMUNITY
Start: 2022-09-02 | End: 2024-05-07

## 2024-05-07 RX ORDER — BUMETANIDE 2 MG/1
2 TABLET ORAL DAILY
Refills: 0 | Status: ACTIVE | COMMUNITY

## 2024-05-07 RX ORDER — FAMOTIDINE 40 MG/1
40 TABLET, FILM COATED ORAL
Refills: 0 | Status: ACTIVE | COMMUNITY

## 2024-05-07 NOTE — CARDIOLOGY SUMMARY
[de-identified] : 9/8/21 : Atrial fibrillation  74 bpm \par  \par  6/7/18 : Atrial fibrillation 103 bpm.  [de-identified] : 4/8/24 : TTE Mild concentric left ventricular hypertrophy is present.  Moderate to severely reduced left ventricular systolic function.  Estimated left ventricular ejection fraction is 30-35 % with global  hypokinesis.  Unable to assess diastolic function due to atrial fibrillation. The right ventricle is normal in size, wall thickness, wall motion, and  contractility based on TAPSE.  The left atrium is moderately dilated.  The right atrium appears mildly dilated.  Moderate to severe mitral regurgitation is present.  Moderate to severe tricuspid valve regurgitation is present.  Mild pulmonary hypertension.  5/18/21 :  WENDI The mitral valve leaflets appear thin and normal.  Moderate (2+) mitral regurgitation is present.  A saline contrast study was performed and showed no evidence of a patent  foramen ovale.  Thrombus seen in the left atrial appendage.  Moderately reduced left ventricular systolic function.  Estimated left ventricular ejection fraction is 35 %.  5/16/21 : TTE  Endocardium is not well visualized; grossly the left ventricle appears  enlarged with mild systolic dysfunction. Visual  stimation of left  ventricle ejection fraction is 45%.  The IVC is dilated with decreased respiratory variation, suggesting  elevated right atrial pressure.  Mild to moderate mitral regurgitation.  Mild tricuspid valve regurgitation.  Mild pulmonary hypertension.  [de-identified] : 4/29/24 :  Diagnostic Conclusions:  1. Moderate mid RCA atherosclerosis (FFR Angio negative) along with moderate OM1 disease. 2. Elevated filling pressure (PCWP 35) with adequate perfusion.

## 2024-05-07 NOTE — ASSESSMENT
[FreeTextEntry1] : This patient comes to see us today for a permanent weight-loss solution. We have discussed at length medical and surgical options for weight loss. The patient is interested in participating in our program. They will begin with our medically supervised multidisciplinary weight-loss program, if they are unsuccessful with this program then surgical intervention will be considered. We have ordered a variety of testing to assess their medical fitness. Patient will also see a dietitian and a psychologist for evaluation and opinions on candidacy for surgical intervention. Patient will return and see our clinical team in one month to review testing.  Patient was counseled today on behavioral, dietary and physical means to try to reduce weight.  I think sleeve gastrectomy is a good choice for him.  This visit last over 60 minutes in total.

## 2024-05-07 NOTE — ASSESSMENT
[FreeTextEntry1] : This is a 63-year-old man with nonischemic dilated cardiomyopathy EF 30%. ECG demonstrates Atrial fib with poor rate control.   During this visit, VIMAL WRAY  and I had a comprehensive discussion of arrhythmia management using principles of shared decision-making. This included a discussion of anti-arrhythmic medications including class I agents (e.g. flecainide), class III agents (e.g. amiodarone, dofetilide, sotalol, etc.), and both class II and IV agents (beta-blockers and calcium channel blockers). We reviewed the potentially life-threatening side effects of these medications, including (but not limited to) fatal tachyarrhythmias, pulmonary toxicity, liver toxicity, thyroid disorders. We also reviewed the requisite monitoring required of some of these medications. In addition, we reviewed ablative therapy, including the potential benefits and risks of catheter ablation. These risks include death, myocardial infarction, stroke, cardiac perforation, vascular injury, and other less severe complications. Mr. WRAY  demonstrated a clear understanding of these issues during our discussion.  Will arrange for hospitalization for dofetilide. Prior to starting will obtain a WENDI given past history of MAY clot. Cardioversion on last day of hospitalization.  Once sinus restored will re-evaluate EF% regarding ICD candidacy and in long term will need PVI/ Catheter ablation.   Labs, Notes , Cath and Echo reviewed.

## 2024-05-07 NOTE — HISTORY OF PRESENT ILLNESS
[de-identified] : VIMAL  is a 63 year  male  here for a consultation for weight loss surgery. He has struggled with his weight for the majority of his life.  With aggressive dieting he has lost up to 120 pounds.  He has been able to previously keep weight off for years at a time, but now is developing weight-related comorbidities and realizes he needs a more sustainable solution.

## 2024-05-07 NOTE — REVIEW OF SYSTEMS
[Recent Change In Weight] : ~T recent weight change [Shortness Of Breath] : shortness of breath [SOB on Exertion] : shortness of breath during exertion [Joint Pain] : joint pain [Negative] : Allergic/Immunologic

## 2024-05-07 NOTE — PHYSICAL EXAM

## 2024-05-07 NOTE — CONSULT LETTER
[Dear  ___] : Dear  [unfilled], [Courtesy Letter:] : I had the pleasure of seeing your patient, [unfilled], in my office today. [Consult Closing:] : Thank you very much for allowing me to participate in the care of this patient.  If you have any questions, please do not hesitate to contact me. [FreeTextEntry2] : Ana María Forte [address] [place] [id number] [DrSofia  ___] : Dr. GONZALEZ [DrSofia ___] : Dr. GONZALEZ

## 2024-05-07 NOTE — HISTORY OF PRESENT ILLNESS
[FreeTextEntry1] : This is a 63 year old man with Obesity, Dilated Cardiomyopathy, Systolic CHF, paroxysmal Atrial fib s/p prior WENDI Cardioversion (2017), possible OMAYRA, COVID in December 2020 who presents after hospitalization for cardiac cath.

## 2024-05-07 NOTE — PHYSICAL EXAM
[TextEntry] : Gen.: Patient is alert and oriented and in no acute distress. Eye: Pupils are equal round and reactive; extraocular muscles are intact. HEENT: Normocephalic atraumatic. Thyro-cervical exam: Trachea is midline.  There is no obvious thyromegaly or cervical adenopathy. Pulmonary: Patient moves air well. Cardiovascular: Perfusion appears good. Abdomen: The abdomen is soft, nontender, nondistended. There is no obvious hernia.  He has a significant pannus. Musculoskeletal: There is no obvious musculoskeletal deformity. Neurologic: There are no focal neurologic findings. Psychiatric: The patient is appropriate with a normal affect.  Skin:  The skin is warm, dry and intact.

## 2024-05-08 DIAGNOSIS — E11.9 TYPE 2 DIABETES MELLITUS W/OUT COMPLICATIONS: ICD-10-CM

## 2024-05-08 RX ORDER — EMPAGLIFLOZIN 10 MG/1
10 TABLET, FILM COATED ORAL DAILY
Qty: 1 | Refills: 0 | Status: ACTIVE | COMMUNITY
Start: 2024-05-08 | End: 1900-01-01

## 2024-05-09 RX ORDER — DOFETILIDE 0.25 MG/1
250 CAPSULE ORAL
Qty: 180 | Refills: 0 | Status: ACTIVE | COMMUNITY
Start: 2024-05-09 | End: 1900-01-01

## 2024-05-13 NOTE — ASU PREOP CHECKLIST - HAND OFF
----- Message from Michael Vidal DO sent at 5/13/2024  6:19 AM CDT -----  Elevated TG, diet modification recommended  Start weekly vitamin-D  Other labs within an acceptable range     yes

## 2024-05-14 ENCOUNTER — APPOINTMENT (OUTPATIENT)
Dept: INTERNAL MEDICINE | Facility: CLINIC | Age: 64
End: 2024-05-14
Payer: COMMERCIAL

## 2024-05-14 VITALS
BODY MASS INDEX: 41.75 KG/M2 | RESPIRATION RATE: 16 BRPM | HEIGHT: 73 IN | TEMPERATURE: 98 F | SYSTOLIC BLOOD PRESSURE: 107 MMHG | HEART RATE: 78 BPM | DIASTOLIC BLOOD PRESSURE: 69 MMHG | OXYGEN SATURATION: 96 % | WEIGHT: 315 LBS

## 2024-05-14 DIAGNOSIS — G47.10 HYPERSOMNIA, UNSPECIFIED: ICD-10-CM

## 2024-05-14 DIAGNOSIS — G47.33 OBSTRUCTIVE SLEEP APNEA (ADULT) (PEDIATRIC): ICD-10-CM

## 2024-05-14 PROCEDURE — 99204 OFFICE O/P NEW MOD 45 MIN: CPT

## 2024-05-14 PROCEDURE — ZZZZZ: CPT

## 2024-05-14 NOTE — PLAN
[TextEntry] : Obstructive Sleep Apnea (OMAYRA): - Severe sleep apnea identified during sleep study on April 14th. Long discussion regarding the results of the sleep study. It was split night study with an AHI of > 100.  - CPAP trial unsuccessful. - Plan:   - Initiate BiPAP therapy with settings of 27/19.   - Monitor compliance and effectiveness of BiPAP therapy.   - Schedule follow-up appointment 30 days after starting BiPAP therapy to review data and make necessary adjustments.  Obesity  : - Recent weight gain, now classified as diabetic. - Considering bariatric surgery. - Plan:   - Continue consultation with bariatric surgeon.   - Encouraged weight loss through lifestyle modifications, including diet and exercise.   - Monitor diabetes management and consider medication if necessary.  Excessive Daytime Sleepiness: - [place] Sleepiness Scale score of 13 out of 24. - Plan:   - Monitor improvement in daytime sleepiness with successful treatment of OMAYRA.   - Reassess sleep hygiene and consider additional interventions if necessary.   - discussed not to drive or operate heavy machinery if sleepy.   Insomnia and Leg Twitching/Jerking During Sleep: - Plan:   - Monitor improvement in insomnia and leg movements with successful treatment of OMAYRA.   - Consider further evaluation and management if symptoms persist after OMAYRA treatment.

## 2024-05-14 NOTE — HISTORY OF PRESENT ILLNESS
[TextBox_4] : Jung Alvarenga came in today for evaluation of his sleep conditions. He has a history of atrial fibrillation (AFib) and is under the care of cardiologists at [facility]. Recent weight gain has led to a diagnosis of diabetes, and he is considering bariatric surgery as a potential solution for his weight issues. Despite worsening knee problems, he has not consulted an orthopedist.  The sleep study, conducted on April 14th , confirmed severe sleep apnea with significant oxygen desaturation and frequent apnea episodes. Jung experiences excessive daytime sleepiness, scoring 13 out of 24 on the [place] Sleepiness Scale, and reports insomnia and leg twitching during sleep. His typical sleep schedule is from 11 PM to 8 or 9 AM, and he often feels groggy upon waking.  He has not pursued specific medications for weight management, citing insurance denial despite his obesity, heart condition, and diabetes. He is skeptical about long-term medication use, preferring significant lifestyle changes, which have previously led to successful weight loss. Jung is motivated to improve his health, recognizing the interconnectedness of his weight, sleep apnea, diabetes, and heart health. [Lab] : lab [TextBox_108] : 105.2 [TextBox_100] : 04/14/2024 [TextBox_116] : 75 [ESS] : 13

## 2024-05-20 ENCOUNTER — APPOINTMENT (OUTPATIENT)
Dept: SURGERY | Facility: CLINIC | Age: 64
End: 2024-05-20
Payer: COMMERCIAL

## 2024-05-20 ENCOUNTER — APPOINTMENT (OUTPATIENT)
Dept: PSYCHIATRY | Facility: CLINIC | Age: 64
End: 2024-05-20
Payer: COMMERCIAL

## 2024-05-20 DIAGNOSIS — Z78.9 OTHER SPECIFIED HEALTH STATUS: ICD-10-CM

## 2024-05-20 PROCEDURE — 90791 PSYCH DIAGNOSTIC EVALUATION: CPT | Mod: 95

## 2024-05-20 PROCEDURE — 97802 MEDICAL NUTRITION INDIV IN: CPT | Mod: 95

## 2024-05-21 ENCOUNTER — APPOINTMENT (OUTPATIENT)
Dept: ORTHOPEDIC SURGERY | Facility: CLINIC | Age: 64
End: 2024-05-21
Payer: COMMERCIAL

## 2024-05-21 VITALS
SYSTOLIC BLOOD PRESSURE: 138 MMHG | DIASTOLIC BLOOD PRESSURE: 83 MMHG | HEIGHT: 73 IN | WEIGHT: 315 LBS | BODY MASS INDEX: 41.75 KG/M2 | HEART RATE: 139 BPM

## 2024-05-21 DIAGNOSIS — M17.0 BILATERAL PRIMARY OSTEOARTHRITIS OF KNEE: ICD-10-CM

## 2024-05-21 DIAGNOSIS — M25.561 PAIN IN RIGHT KNEE: ICD-10-CM

## 2024-05-21 DIAGNOSIS — M25.562 PAIN IN RIGHT KNEE: ICD-10-CM

## 2024-05-21 PROCEDURE — 99204 OFFICE O/P NEW MOD 45 MIN: CPT

## 2024-05-21 PROCEDURE — 73564 X-RAY EXAM KNEE 4 OR MORE: CPT | Mod: 50

## 2024-05-21 NOTE — PHYSICAL EXAM
[de-identified] : Bilateral lower extremity Hip: Normal ROM without pain on IR/ER Knee Inspection: no effusion Wounds: None Alignment: Neutral Palpation: Nontender to palpation ROM active (in degrees): 0-110 with crepitus/pain through the arc of motion Ligamentous laxity: all ligaments appear stable, stable to varus stress test, stable to valgus stress test Muscle Test: good quad strength. Significant bilateral pitting edema with mild venous stasis changes [de-identified] : 5/21/24: Bilateral knee xrays, standing AP/Lateral and Merchant films, and 45 degree PA standing view are reviewed and demonstrate diffuse tricompartmental degenerative arthritis, medial and lateral joint space narrowing, marginal osteophytes, bone on bone with sclerosis and patellofemoral joint, patellofemoral joint space narrowing

## 2024-05-21 NOTE — DISCUSSION/SUMMARY
[de-identified] : Bilateral knee osteoarthritis  Extensive discussion of the natural history of this disease was had with the patient.  We discussed the treatment options ranging from conservative therapy which includes anti-inflammatories, steroid/HA injections, physical therapy, weight loss, knee sleeves/braces, and activity modification.  We did discuss that the ultimate treatment for arthritis is a joint replacement.  However at this time we have decided to continue with conservative treatment.   We discussed the risk of infection due to an elevated BMI.  Patient is working on losing weight and was likely going to have bariatric surgery which will help expedite the process.  We discussed the goal is to have a BMI of under 40 to help reduce the risk of infection.  Also discussed patient's knees were likely improved with weight loss as well. Discussed injections however patient would like to hold off at this time. Patient will follow-up as needed if the pain returns or does not improve.  The patient's current medication management of their orthopedic diagnosis was reviewed today: (1) We discussed a comprehensive treatment plan that included possible pharmaceutical management involving the use of prescription strength medications including but not limited to options such as oral Ibuprofen 400mg QID, once daily Meloxicam 15 mg, or 500-650 mg Tylenol versus over the counter oral medications and topical prescription NSAID Pennsaid vs over the counter Voltaren gel. (2) There is a moderate risk of morbidity with further treatment, especially from use of prescription strength medications and possible side effects of these medications which include upset stomach with oral medications, skin reactions to topical medications and cardiac/renal issues with long term use. (3) I recommended that the patient follow-up with their medical physician to discuss any significant specific potential issues with long term medication use such as interactions with current medications or with exacerbation of underlying medical comorbidities. (4) The benefits and risks associated with use of injectable, oral or topical, prescription and over the counter anti-inflammatory medications were discussed with the patient. The patient voiced understanding of the risks including but not limited to bleeding, stroke, kidney dysfunction, heart disease, and were referred to the black box warning label for further information.

## 2024-05-21 NOTE — HISTORY OF PRESENT ILLNESS
[de-identified] : 5/21/24: Patient presents with bilateral knee pain.  Is been on for about a year.  Feels that been getting worse over the last few months.  He used to be very active in her marathons and bike riding however has gained significant weight recently.  He is work on losing it and is considering getting bariatric surgery in August.  Pain is diffuse in the knees.  Has not done anything specifically for the knees in the past.  Denies allergies, is on Eliquis denies tobacco use, PMH-A-fib, sleep apnea, type 2 diabetes A1c 6.6, morbid obesity

## 2024-05-22 ENCOUNTER — RX RENEWAL (OUTPATIENT)
Age: 64
End: 2024-05-22

## 2024-05-22 RX ORDER — ATORVASTATIN CALCIUM 40 MG/1
40 TABLET, FILM COATED ORAL
Qty: 30 | Refills: 6 | Status: ACTIVE | COMMUNITY
Start: 2021-10-13 | End: 1900-01-01

## 2024-05-22 RX ORDER — METOPROLOL SUCCINATE 50 MG/1
50 TABLET, EXTENDED RELEASE ORAL
Qty: 30 | Refills: 3 | Status: ACTIVE | COMMUNITY
Start: 2021-09-17 | End: 1900-01-01

## 2024-05-23 NOTE — PHYSICAL EXAM
[Normal] : good [AH] : no auditory hallucinations [VH] : no visual hallucinations [Suicidal Ideation] : no suicidal ideation [Homicidal Ideation] : no homicidal ideation [FreeTextEntry1] : normal appearance, well groomed, well nourished, obese [FreeTextEntry8] : "stable"

## 2024-05-23 NOTE — SOCIAL HISTORY
[None] : none [FreeTextEntry1] : Lives with his wife of almost 43 years and youngest son who is 28 and has downs syndrome [FreeTextEntry2] : Self-employed, owns a couple of businesses [FreeTextEntry3] :  with 3 children. 28, 31, 34 [FreeTextEntry4] : Master's degree in Public Policy from [employer], some work toward a doctorate [FreeTextEntry5] : Pt endorsed experiencing difficult psychosocial stressors, denied symptoms of PTSD.

## 2024-05-23 NOTE — HISTORY OF PRESENT ILLNESS
[Genetics] : genetics [Poor Choices] : poor choices [Large Portions] : large portions [Decrease Activity] : decrease activity [Mindless Eating] : mindless eating [de-identified] : Pt's motivation for surgery is to reduce obesity related sequelae, including OMAYRA and DM2 and to improve overall quality of life, including self-image and increased physical activity. Per pt, his highest weight was 419 lbs this past April and his lowest weight was 174 lbs at age 19. His stated goal weight is 240 lbs and he expresses intent to make behavioral and dietary changes towards obtaining optimal results. [de-identified] : sleeve gastrectomy   [de-identified] : Discussions with surgeon;  discussions with someone who had bariatric surgery, [de-identified] : none.  Pt denies ED hx and bxs, including binge eating.    [de-identified] : A current typical day of eating is reported as follows: Intermittent fasting Meal 1: 11am deli turkey, chicken salad Meal 2: 2 pm Salad Meal 3: 7pm salad,  Snack: chocolate chip cookie after dinner Drinks: seltzer, water [de-identified] : reducing carbs, eating lean proteins, vegetables, caloric restriction, structured diet, combined with exercise. Despite multiple attempts at diet and exercise modifications, patient reports inability to maintain weight loss.

## 2024-05-23 NOTE — REASON FOR VISIT
[Home] : at home, [unfilled] , at the time of the visit. [Other Location: e.g. Home (Enter Location, City,State)___] : at [unfilled] [Patient] : the patient [Initial Consult] : an initial consult for [Morbid Obesity (BMI>40)] : morbid obesity (bmi>40) [Referring By:  ___] : ~[address]~ was referred for psychological evaluation by Dr. GONZALEZ [Attempted Weight Loss] : attempted weight loss [Commitment to Modified Lifestyle] : commitment to a modified lifestyle pre and post surgery [Difficulties with Diet Compliance] : difficulties with diet compliance  [Expectations of Outcome] : expectations of outcome [Motivation for Selecting Surgery] : motivation for selecting surgery [Strength of Social Support System] : strength of social support system [Patient Understands Data May be Shared] : patient understands that the information discussed during the evaluation would be shared with referring provider and possibly with ~his/her~ insurance provider [de-identified] : for evaluation of psychological appropriateness for bariatric surgery      [de-identified] : confidentiality and its limits were discussed

## 2024-05-23 NOTE — CURRENT PSYCHIATRIC SYMPTOMS
[de-identified] :  Pt denied current or past symptoms of  depression [de-identified] :  Pt denied current or past symptoms of linden. [de-identified] : no delusions, no visual hallucinations, no auditory hallucinations and a thought disorder was not noted. No evidence of psychosis. [de-identified] :  Pt denied current or past symptoms of an anxiety disorder. [de-identified] : denied [de-identified] : denied [FreeTextEntry1] : Mr. Terry reports seeing a marriage counselor with his wife in 2014. Pt denied any history of psychiatric symptoms or individual psychotherapy. He denied ever attending or being in need of psychotherapy, denied ever taking psychotropic medication and denied ever being hospitalized for a psychiatric reason.

## 2024-05-23 NOTE — DISCUSSION/SUMMARY
[Behavior plan developed] : Behavior plan developed [Strategies to improve adherence identified] : Strategies to improve adherence identified [Develop and refine illness management to behavior plan] : Develop and refine illness management to behavior plan [Identify, practice refine strategies to promote adherence to regimen] : Identify, practice refine strategies to promote adherence to regimen [FreeTextEntry1] : Based on the information presented in this assessment, Mr. WRAY does not have any current psychological contraindications for bariatric surgery. He is cleared for surgery from a psychological perspective. [de-identified] : Psychological factors (overeating, poor dietary choices) affecting other medical conditions (obesity and associated medical sequelae). Obesity [FreeTextEntry3] : Behavioral strategies were discussed to increase his coping skills and assist him in making lifestyle modifications. Provided psychoeducation on changing eating behaviors in preparation for surgery. It was recommended that he attend the post-surgery group sessions for further education and support to assist him in making lifestyle changes. Additionally, it was recommended that he utilize writer and RD as needed to assist in making pre-surgical and post-surgical dietary and behavioral changes. [FreeTextEntry5] : compliance with behavioral and dietary recommendations [FreeTextEntry6] : reduction of obesity related co-morbidities  [FreeTextEntry9] : increased physical activity

## 2024-05-28 ENCOUNTER — APPOINTMENT (OUTPATIENT)
Dept: UROLOGY | Facility: CLINIC | Age: 64
End: 2024-05-28
Payer: COMMERCIAL

## 2024-05-28 VITALS
DIASTOLIC BLOOD PRESSURE: 85 MMHG | RESPIRATION RATE: 16 BRPM | SYSTOLIC BLOOD PRESSURE: 130 MMHG | HEIGHT: 73 IN | HEART RATE: 116 BPM | WEIGHT: 315 LBS | OXYGEN SATURATION: 93 % | BODY MASS INDEX: 41.75 KG/M2

## 2024-05-28 DIAGNOSIS — R39.15 URGENCY OF URINATION: ICD-10-CM

## 2024-05-28 DIAGNOSIS — R35.0 FREQUENCY OF MICTURITION: ICD-10-CM

## 2024-05-28 DIAGNOSIS — R97.20 ELEVATED PROSTATE, SPECIFIC ANTIGEN [PSA]: ICD-10-CM

## 2024-05-28 PROCEDURE — 99203 OFFICE O/P NEW LOW 30 MIN: CPT

## 2024-05-28 NOTE — PHYSICAL EXAM
[Normal Appearance] : normal appearance [Well Groomed] : well groomed [General Appearance - In No Acute Distress] : no acute distress [Edema] : no peripheral edema [Respiration, Rhythm And Depth] : normal respiratory rhythm and effort [Exaggerated Use Of Accessory Muscles For Inspiration] : no accessory muscle use [Abdomen Soft] : soft [Abdomen Tenderness] : non-tender [Costovertebral Angle Tenderness] : no ~M costovertebral angle tenderness [Urinary Bladder Findings] : the bladder was normal on palpation [Normal Station and Gait] : the gait and station were normal for the patient's age [] : no rash [No Focal Deficits] : no focal deficits [Oriented To Time, Place, And Person] : oriented to person, place, and time [Affect] : the affect was normal [Mood] : the mood was normal [No Palpable Adenopathy] : no palpable adenopathy [de-identified] : SANDRA deferred due to body habitus

## 2024-05-28 NOTE — HISTORY OF PRESENT ILLNESS
[FreeTextEntry1] : 63 year old man seen 05/28/2024  with complaint of elevated PSA. This began 1 month ago, where it was found to be 5.4. Reports he did not have any other recent PSA checks prior to this. Patient states he does note urinary frequency and urgency after he started a diuretic and Jardiance. Reports his LUTS are not too bothersome. IPSS 15 No hematuria, no dysuria, no hesitancy, no straining. No incontinence.  No fevers, no chills, no nausea, no vomiting, no flank pain. Reports family history of prostate ca in father at the age of 65 and uncle.

## 2024-05-28 NOTE — ASSESSMENT
[FreeTextEntry1] : 63 year old male with elevates PSA of 5.4 and with urgency/frequency but attributes it to diuretics.  Discussed with patient that PSA is imprecise test. PSA can be elevated due to benign prostate enlargement, prostate stimulation, sexual activity, urinary tract infection, prostatitis, as well as prostate cancer. There is no value for PSA which is diagnostic for prostate cancer, nor is there value which conclusively excludes prostate cancer. PSA simply stratifies risk for prostate cancer and diagnosis of prostate cancer requires prostate biopsy. Discussed with patient a repeat PSA is recommended, if elevated would recommend MRI. Will plan for fusion biopsy if MRI concerning for any lesions. Will obtain UA/UCx Free and total PSA in 2 months and inform patient about results.

## 2024-05-29 ENCOUNTER — APPOINTMENT (OUTPATIENT)
Dept: CARDIOLOGY | Facility: CLINIC | Age: 64
End: 2024-05-29
Payer: COMMERCIAL

## 2024-05-29 VITALS
RESPIRATION RATE: 16 BRPM | SYSTOLIC BLOOD PRESSURE: 126 MMHG | HEIGHT: 73 IN | HEART RATE: 82 BPM | WEIGHT: 315 LBS | DIASTOLIC BLOOD PRESSURE: 83 MMHG | OXYGEN SATURATION: 95 % | BODY MASS INDEX: 41.75 KG/M2

## 2024-05-29 PROCEDURE — 93000 ELECTROCARDIOGRAM COMPLETE: CPT

## 2024-05-29 PROCEDURE — 99215 OFFICE O/P EST HI 40 MIN: CPT | Mod: 25

## 2024-05-29 NOTE — DISCUSSION/SUMMARY
[FreeTextEntry1] : 60 year old man with Obesity, dilated cardiomyopathy with EF 40% (2017, thought to be from atrial fibrillation), HF, paroxysmal Atrial fib s/p prior WENDI cardioversion (2017), possible OMAYRA, COVID in December 2020 who presents to me today for first time for cardiac care.   HFrEF 45% presumed to be from pAF  Now further reduced  Euvolemic on exam. Long standing cardiomyopathy with TEEs recently revealing mild LV dysfunction as well. Moderate-severe MR.  -continue metoprolol 50 mg XL  -c/w Losartan 25mg daily in preparation for need to transition to entresto in near future -spironolactone discontinued, now on jardiance 10 mg HFrEF  -bumex 2 mg oral daily  -TTE 4/2024: further reduction in EF to 30-35%, moderate-severe MR. Dilated IVC.   CAD Moderate diffuse atherosclerosis on LHC/RHC 2024 -c/w atorvastatin 40 mg  -on DOAC, no plan for aspirin at this time -will measure lipids in 3 months to ensure improvement.   Atrial Fibrillation -c/w apixaban 5 mg q12 hours -plan for tikosyn load and DVVC next week   Morbid Obesity  Still with uprising weight. a1c uptrending 6.6 [id number] sleep study for OSA_ CPAP delivery pending  Pt's weight is stable at 388lb -Discussed all options for weight loss surgery -Will try for approval for injectable ( Ozampic?)  -Pt is currently under the care of bariatric surgeon for possibly surgery in august I, Dr. Aragon personally performed the evaluation and management (E/M) services for this established patient who presents today with (a) new problem(s)/exacerbation of (an) existing condition(s).  That E/M includes conducting the clinically appropriate interval history &/or exam, assessing all new/exacerbated conditions, and establishing a new plan of care.  Today, my LEROY, Tahmina Julio CesarCuedd, was here to observe &/or participated in my evaluation and management service for this new problem/exacerbated condition and follow the plan of care established by me going forward.        [EKG obtained to assist in diagnosis and management of assessed problem(s)] : EKG obtained to assist in diagnosis and management of assessed problem(s)

## 2024-05-29 NOTE — HISTORY OF PRESENT ILLNESS
[FreeTextEntry1] : Jung Terry is a 60 year old man with morbid obesity, dilated cardiomyopathy with EF 40% (2017, thought to be from atrial fibrillation), HF, paroxysmal Atrial fib s/p prior WENDI cardioversion (2017), possible OMAYRA, COVID in December 2020 who presents for follow up.   Had cath last year. He was found to have diffuse moderate atherosclerosis in his epicardial vessels with no obstructive lesions.   He denies bleeding, bruising, chest pain, confusion, epistaxis, falls/injuries, hematochezia, hematemesis, hematuria, hemoptysis, melena, Pain/swelling in LE, or SOB   Teeth fixed with moldings, done at end of the last year. After that, there is a plan for antiarrhythmic strategy with in hospital loading- defer to EP.  Regarding cardiovascular health, he has not been exercising as much and has gained weight. He reports more SOB recently with [place] getting through the airport.  Lasix 40 mg usually, but since then took it BID. It did help. No PND, waking up from urination.   TTE 4/2024: further reduction in EF to 30-35%, moderate-severe MR. Dilated IVC.  LDL 57, .  A1C 6.6  Today patient returning for follow post TTE and sleep study - feels tired and SOB, frustrated with not seeing weight loss despite counting calories and cutting portion. Reported sleep study, failed, CPAP machine pending delivery. complaint with medications; inconsistent with lasix due to increased urination and interferes with social; does take once a day instead of 2x day;  reports PSA elevated to 5.6 urology evaluation pending  PCP recommended bariatric surgery eval; unable to get weight loss meds approved.  patient experiencing GREY, sleeping in recliner sometimes.  5/29/24 Today's visit is to discuss further pursing rhythm control of his afib via hospital stay this 6/3/24 for tikosyn load. He is currently pursing bariatric surgery possible august date and working with a nutritionist, would like to consider trying to get medication approved again. Pt has been more compliant with lasix, weight is stable at 388lb.

## 2024-05-30 ENCOUNTER — APPOINTMENT (OUTPATIENT)
Dept: ORTHOPEDIC SURGERY | Facility: CLINIC | Age: 64
End: 2024-05-30

## 2024-05-30 LAB
APPEARANCE: CLEAR
BACTERIA UR CULT: NORMAL
BACTERIA: NEGATIVE /HPF
BILIRUBIN URINE: NEGATIVE
BLOOD URINE: NEGATIVE
CAST: 4 /LPF
COLOR: YELLOW
EPITHELIAL CELLS: 1 /HPF
GLUCOSE QUALITATIVE U: >=1000 MG/DL
KETONES URINE: NEGATIVE MG/DL
LEUKOCYTE ESTERASE URINE: NEGATIVE
MICROSCOPIC-UA: NORMAL
NITRITE URINE: NEGATIVE
PH URINE: 6
PROTEIN URINE: 30 MG/DL
RED BLOOD CELLS URINE: 2 /HPF
SPECIFIC GRAVITY URINE: 1.03
UROBILINOGEN URINE: 1 MG/DL
WHITE BLOOD CELLS URINE: 0 /HPF

## 2024-05-31 ENCOUNTER — NON-APPOINTMENT (OUTPATIENT)
Age: 64
End: 2024-05-31

## 2024-06-03 ENCOUNTER — NON-APPOINTMENT (OUTPATIENT)
Age: 64
End: 2024-06-03

## 2024-06-10 ENCOUNTER — INPATIENT (INPATIENT)
Facility: HOSPITAL | Age: 64
LOS: 2 days | Discharge: HOME | DRG: 309 | End: 2024-06-13
Attending: INTERNAL MEDICINE | Admitting: INTERNAL MEDICINE
Payer: COMMERCIAL

## 2024-06-10 ENCOUNTER — RESULT REVIEW (OUTPATIENT)
Age: 64
End: 2024-06-10

## 2024-06-10 VITALS
DIASTOLIC BLOOD PRESSURE: 62 MMHG | SYSTOLIC BLOOD PRESSURE: 134 MMHG | HEIGHT: 73 IN | RESPIRATION RATE: 18 BRPM | WEIGHT: 315 LBS | OXYGEN SATURATION: 95 % | HEART RATE: 117 BPM | TEMPERATURE: 97 F

## 2024-06-10 DIAGNOSIS — I48.91 UNSPECIFIED ATRIAL FIBRILLATION: ICD-10-CM

## 2024-06-10 DIAGNOSIS — Z90.89 ACQUIRED ABSENCE OF OTHER ORGANS: Chronic | ICD-10-CM

## 2024-06-10 LAB
ANION GAP SERPL CALC-SCNC: 6 MMOL/L — SIGNIFICANT CHANGE UP (ref 5–17)
BUN SERPL-MCNC: 15 MG/DL — SIGNIFICANT CHANGE UP (ref 7–23)
CALCIUM SERPL-MCNC: 8.5 MG/DL — SIGNIFICANT CHANGE UP (ref 8.5–10.1)
CHLORIDE SERPL-SCNC: 105 MMOL/L — SIGNIFICANT CHANGE UP (ref 96–108)
CO2 SERPL-SCNC: 26 MMOL/L — SIGNIFICANT CHANGE UP (ref 22–31)
CREAT SERPL-MCNC: 1.16 MG/DL — SIGNIFICANT CHANGE UP (ref 0.5–1.3)
EGFR: 70 ML/MIN/1.73M2 — SIGNIFICANT CHANGE UP
GLUCOSE BLDC GLUCOMTR-MCNC: 105 MG/DL — HIGH (ref 70–99)
GLUCOSE SERPL-MCNC: 116 MG/DL — HIGH (ref 70–99)
HCT VFR BLD CALC: 38.4 % — LOW (ref 39–50)
HGB BLD-MCNC: 12.1 G/DL — LOW (ref 13–17)
MAGNESIUM SERPL-MCNC: 2.2 MG/DL — SIGNIFICANT CHANGE UP (ref 1.6–2.6)
MCHC RBC-ENTMCNC: 29 PG — SIGNIFICANT CHANGE UP (ref 27–34)
MCHC RBC-ENTMCNC: 31.5 GM/DL — LOW (ref 32–36)
MCV RBC AUTO: 92.1 FL — SIGNIFICANT CHANGE UP (ref 80–100)
PLATELET # BLD AUTO: 270 K/UL — SIGNIFICANT CHANGE UP (ref 150–400)
POTASSIUM SERPL-MCNC: 3.6 MMOL/L — SIGNIFICANT CHANGE UP (ref 3.5–5.3)
POTASSIUM SERPL-MCNC: 4.4 MMOL/L — SIGNIFICANT CHANGE UP (ref 3.5–5.3)
POTASSIUM SERPL-SCNC: 3.6 MMOL/L — SIGNIFICANT CHANGE UP (ref 3.5–5.3)
POTASSIUM SERPL-SCNC: 4.4 MMOL/L — SIGNIFICANT CHANGE UP (ref 3.5–5.3)
RBC # BLD: 4.17 M/UL — LOW (ref 4.2–5.8)
RBC # FLD: 16.6 % — HIGH (ref 10.3–14.5)
SODIUM SERPL-SCNC: 137 MMOL/L — SIGNIFICANT CHANGE UP (ref 135–145)
WBC # BLD: 8.62 K/UL — SIGNIFICANT CHANGE UP (ref 3.8–10.5)
WBC # FLD AUTO: 8.62 K/UL — SIGNIFICANT CHANGE UP (ref 3.8–10.5)

## 2024-06-10 PROCEDURE — 93325 DOPPLER ECHO COLOR FLOW MAPG: CPT | Mod: 26

## 2024-06-10 PROCEDURE — 36415 COLL VENOUS BLD VENIPUNCTURE: CPT

## 2024-06-10 PROCEDURE — 84132 ASSAY OF SERUM POTASSIUM: CPT

## 2024-06-10 PROCEDURE — 93312 ECHO TRANSESOPHAGEAL: CPT | Mod: 26

## 2024-06-10 PROCEDURE — 85027 COMPLETE CBC AUTOMATED: CPT

## 2024-06-10 PROCEDURE — 92960 CARDIOVERSION ELECTRIC EXT: CPT

## 2024-06-10 PROCEDURE — 80048 BASIC METABOLIC PNL TOTAL CA: CPT

## 2024-06-10 PROCEDURE — ZZZZZ: CPT

## 2024-06-10 PROCEDURE — 93010 ELECTROCARDIOGRAM REPORT: CPT

## 2024-06-10 PROCEDURE — 99223 1ST HOSP IP/OBS HIGH 75: CPT

## 2024-06-10 PROCEDURE — 83735 ASSAY OF MAGNESIUM: CPT

## 2024-06-10 PROCEDURE — 93005 ELECTROCARDIOGRAM TRACING: CPT

## 2024-06-10 PROCEDURE — 82962 GLUCOSE BLOOD TEST: CPT

## 2024-06-10 PROCEDURE — 83036 HEMOGLOBIN GLYCOSYLATED A1C: CPT

## 2024-06-10 PROCEDURE — 93320 DOPPLER ECHO COMPLETE: CPT | Mod: 26

## 2024-06-10 RX ORDER — DEXTROSE 50 % IN WATER 50 %
15 SYRINGE (ML) INTRAVENOUS ONCE
Refills: 0 | Status: DISCONTINUED | OUTPATIENT
Start: 2024-06-10 | End: 2024-06-13

## 2024-06-10 RX ORDER — DEXTROSE 10 % IN WATER 10 %
125 INTRAVENOUS SOLUTION INTRAVENOUS ONCE
Refills: 0 | Status: DISCONTINUED | OUTPATIENT
Start: 2024-06-10 | End: 2024-06-13

## 2024-06-10 RX ORDER — DEXTROSE 50 % IN WATER 50 %
12.5 SYRINGE (ML) INTRAVENOUS ONCE
Refills: 0 | Status: DISCONTINUED | OUTPATIENT
Start: 2024-06-10 | End: 2024-06-13

## 2024-06-10 RX ORDER — SODIUM CHLORIDE 9 MG/ML
1000 INJECTION, SOLUTION INTRAVENOUS
Refills: 0 | Status: DISCONTINUED | OUTPATIENT
Start: 2024-06-10 | End: 2024-06-13

## 2024-06-10 RX ORDER — INSULIN LISPRO 100/ML
VIAL (ML) SUBCUTANEOUS
Refills: 0 | Status: DISCONTINUED | OUTPATIENT
Start: 2024-06-10 | End: 2024-06-13

## 2024-06-10 RX ORDER — POTASSIUM CHLORIDE 20 MEQ
40 PACKET (EA) ORAL ONCE
Refills: 0 | Status: COMPLETED | OUTPATIENT
Start: 2024-06-10 | End: 2024-06-10

## 2024-06-10 RX ORDER — LOSARTAN POTASSIUM 100 MG/1
1 TABLET, FILM COATED ORAL
Refills: 0 | DISCHARGE

## 2024-06-10 RX ORDER — GLUCAGON INJECTION, SOLUTION 0.5 MG/.1ML
1 INJECTION, SOLUTION SUBCUTANEOUS ONCE
Refills: 0 | Status: DISCONTINUED | OUTPATIENT
Start: 2024-06-10 | End: 2024-06-13

## 2024-06-10 RX ORDER — DEXTROSE 50 % IN WATER 50 %
25 SYRINGE (ML) INTRAVENOUS ONCE
Refills: 0 | Status: DISCONTINUED | OUTPATIENT
Start: 2024-06-10 | End: 2024-06-13

## 2024-06-10 RX ORDER — ATORVASTATIN CALCIUM 80 MG/1
1 TABLET, FILM COATED ORAL
Refills: 0 | DISCHARGE

## 2024-06-10 RX ORDER — DOFETILIDE 0.25 MG/1
250 CAPSULE ORAL EVERY 12 HOURS
Refills: 0 | Status: DISCONTINUED | OUTPATIENT
Start: 2024-06-10 | End: 2024-06-13

## 2024-06-10 RX ORDER — BUMETANIDE 0.25 MG/ML
2 INJECTION INTRAMUSCULAR; INTRAVENOUS DAILY
Refills: 0 | Status: DISCONTINUED | OUTPATIENT
Start: 2024-06-10 | End: 2024-06-13

## 2024-06-10 RX ORDER — METOPROLOL TARTRATE 50 MG
50 TABLET ORAL DAILY
Refills: 0 | Status: DISCONTINUED | OUTPATIENT
Start: 2024-06-10 | End: 2024-06-13

## 2024-06-10 RX ORDER — ATORVASTATIN CALCIUM 80 MG/1
40 TABLET, FILM COATED ORAL AT BEDTIME
Refills: 0 | Status: DISCONTINUED | OUTPATIENT
Start: 2024-06-10 | End: 2024-06-13

## 2024-06-10 RX ORDER — LOSARTAN POTASSIUM 100 MG/1
25 TABLET, FILM COATED ORAL DAILY
Refills: 0 | Status: DISCONTINUED | OUTPATIENT
Start: 2024-06-10 | End: 2024-06-13

## 2024-06-10 RX ORDER — APIXABAN 2.5 MG/1
5 TABLET, FILM COATED ORAL EVERY 12 HOURS
Refills: 0 | Status: DISCONTINUED | OUTPATIENT
Start: 2024-06-10 | End: 2024-06-13

## 2024-06-10 RX ADMIN — Medication 50 MILLIGRAM(S): at 12:28

## 2024-06-10 RX ADMIN — DOFETILIDE 250 MICROGRAM(S): 0.25 CAPSULE ORAL at 22:10

## 2024-06-10 RX ADMIN — Medication 40 MILLIEQUIVALENT(S): at 14:45

## 2024-06-10 RX ADMIN — ATORVASTATIN CALCIUM 40 MILLIGRAM(S): 80 TABLET, FILM COATED ORAL at 22:10

## 2024-06-10 RX ADMIN — APIXABAN 5 MILLIGRAM(S): 2.5 TABLET, FILM COATED ORAL at 22:10

## 2024-06-10 RX ADMIN — Medication 40 MILLIEQUIVALENT(S): at 18:28

## 2024-06-10 NOTE — CHART NOTE - NSCHARTNOTEFT_GEN_A_CORE
TRANSESOPHAGEAL ECHOCARDIOGRAPHY PROCEDURE NOTE    Indication: atrial fibrillation r/o LA/MAY appendage thrombus.      Sedation:   propofol, see anesthesiology note for details.    Findings:     1. Left ventricular cavity is mildly dilated. Left ventricular wall thickness is mildly increased.   Left ventricular systolic function is moderately to severely decreased with an ejection fraction visually estimated at 30 to 35 %.   2. The left atrium is moderately dilated.   3. Moderate to severe mitral regurgitation.   4. LImited study performed given patient's severe obstructive sleep apnea.   5. No evidence of left atrial or left atrial appendage thrombus.    Reccomendations:  As per Dr. Stevenson plan for admission after WENDI for 3 days of   dofetilide (tikosyn) loading then cardioversion after 3 days.    Complications: None

## 2024-06-10 NOTE — PROCEDURAL SAFETY CHECKLIST WITH OR WITHOUT SEDATION - NSPOSTCOMMENTFT_GEN_ALL_CORE
Anesthesia start@ Probe in@ 0735 bubble study @ 0766 Probe out@ 0739. pt tolerated procedure. Anesthesia start 10:15. Probe in 10:30, bubble study at 10:33  Probe out 10:34. Patient tolerated procedure well. Dr Deleon to discuss findings with patient and wife. Will continue to monitor until patient discharged from procedure.

## 2024-06-10 NOTE — ASU PATIENT PROFILE, ADULT - BLOOD AVOIDANCE/RESTRICTIONS, PROFILE
none Complex Repair And W Plasty Text: The defect edges were debeveled with a #15 scalpel blade.  The primary defect was closed partially with a complex linear closure.  Given the location of the remaining defect, shape of the defect and the proximity to free margins a W plasty was deemed most appropriate for complete closure of the defect.  Using a sterile surgical marker, an appropriate advancement flap was drawn incorporating the defect and placing the expected incisions within the relaxed skin tension lines where possible.    The area thus outlined was incised deep to adipose tissue with a #15 scalpel blade.  The skin margins were undermined to an appropriate distance in all directions utilizing iris scissors.

## 2024-06-10 NOTE — H&P CARDIOLOGY - COMMENTS
Paroxysmal Atrial fibrillation    will admit to tele  stat EKG   daily BMP / Mg  will start Tikosyn 250mcg bid with EKG 2hrs after each dose to monitor QTc.   If QT >500ms hold dose and call EPS for dose adjustement Paroxysmal Atrial fibrillation  will admit to tele  stat EKG  daily BMP / Mg  will start Tikosyn 250mcg bid with EKG 2hrs after each dose to monitor QTc - QTc at baseline 489ms  If QT >500ms hold dose and call EPS for dose adjustment

## 2024-06-10 NOTE — H&P CARDIOLOGY - NS ATTEND AMEND GEN_ALL_CORE FT
63 year old man with nonischemic cardiomyopathy persistent atrial fibrillation with past history of MAY clot.   Admitted initially for WENDI to clear MYA and then initiation of Tikosyn with planned cardioversion.

## 2024-06-10 NOTE — H&P CARDIOLOGY - HISTORY OF PRESENT ILLNESS
62yo male with obesity, non ischemic dilated cardiomyopathy, systolic CHF, persistent atrial fibrillation prior WENDI/Cardioversion in 2017, who is also undergoing weight loss surgery evaluation, recommended for rhythm management with antiarrhythmic drug tikosyn follow up by cardioversion.  He presented this morning for elective WENDI and admission for aad loading.   62yo male with obesity, non ischemic dilated cardiomyopathy, systolic CHF, persistent atrial fibrillation prior WENDI/Cardioversion in 2017, who is also undergoing weight loss surgery evaluation, recommended for rhythm management with antiarrhythmic drug tikosyn follow up by cardioversion.  He presented this morning for elective WENDI and admission for AAD tikosyn loading.        ICU Vital Signs Last 24 Hrs  T(C): 36 (10 Yinka 2024 08:50), Max: 36 (10 Yinka 2024 08:50)  T(F): 96.8 (10 Yinka 2024 08:50), Max: 96.8 (10 Yinka 2024 08:50)  HR: 105 (10 Yinka 2024 12:20) (105 - 124)  BP: 117/84 (10 Yinka 2024 12:20) (111/62 - 134/86)  BP(mean): --  ABP: --  ABP(mean): --  RR: 18 (10 Yinka 2024 12:20) (18 - 18)  SpO2: 93% (10 Yinka 2024 12:20) (92% - 95%)    O2 Parameters below as of 10 Yinka 2024 12:20  Patient On (Oxygen Delivery Method): nasal cannula  O2 Flow (L/min): 2

## 2024-06-10 NOTE — H&P CARDIOLOGY - RESPIRATORY
Bladder non-tender and non-distended. Urine clear yellow. Breath Sounds equal & clear to percussion & auscultation, no accessory muscle use

## 2024-06-11 LAB
A1C WITH ESTIMATED AVERAGE GLUCOSE RESULT: 6.7 % — HIGH (ref 4–5.6)
ANION GAP SERPL CALC-SCNC: 4 MMOL/L — LOW (ref 5–17)
BUN SERPL-MCNC: 16 MG/DL — SIGNIFICANT CHANGE UP (ref 7–23)
CALCIUM SERPL-MCNC: 8.7 MG/DL — SIGNIFICANT CHANGE UP (ref 8.5–10.1)
CHLORIDE SERPL-SCNC: 107 MMOL/L — SIGNIFICANT CHANGE UP (ref 96–108)
CO2 SERPL-SCNC: 28 MMOL/L — SIGNIFICANT CHANGE UP (ref 22–31)
CREAT SERPL-MCNC: 0.92 MG/DL — SIGNIFICANT CHANGE UP (ref 0.5–1.3)
EGFR: 92 ML/MIN/1.73M2 — SIGNIFICANT CHANGE UP
ESTIMATED AVERAGE GLUCOSE: 146 MG/DL — HIGH (ref 68–114)
GLUCOSE BLDC GLUCOMTR-MCNC: 101 MG/DL — HIGH (ref 70–99)
GLUCOSE BLDC GLUCOMTR-MCNC: 112 MG/DL — HIGH (ref 70–99)
GLUCOSE BLDC GLUCOMTR-MCNC: 114 MG/DL — HIGH (ref 70–99)
GLUCOSE BLDC GLUCOMTR-MCNC: 115 MG/DL — HIGH (ref 70–99)
GLUCOSE SERPL-MCNC: 103 MG/DL — HIGH (ref 70–99)
MAGNESIUM SERPL-MCNC: 2.3 MG/DL — SIGNIFICANT CHANGE UP (ref 1.6–2.6)
POTASSIUM SERPL-MCNC: 4.2 MMOL/L — SIGNIFICANT CHANGE UP (ref 3.5–5.3)
POTASSIUM SERPL-SCNC: 4.2 MMOL/L — SIGNIFICANT CHANGE UP (ref 3.5–5.3)
SODIUM SERPL-SCNC: 139 MMOL/L — SIGNIFICANT CHANGE UP (ref 135–145)

## 2024-06-11 PROCEDURE — 93010 ELECTROCARDIOGRAM REPORT: CPT | Mod: 76

## 2024-06-11 PROCEDURE — 99233 SBSQ HOSP IP/OBS HIGH 50: CPT

## 2024-06-11 RX ADMIN — BUMETANIDE 2 MILLIGRAM(S): 0.25 INJECTION INTRAMUSCULAR; INTRAVENOUS at 09:50

## 2024-06-11 RX ADMIN — APIXABAN 5 MILLIGRAM(S): 2.5 TABLET, FILM COATED ORAL at 09:51

## 2024-06-11 RX ADMIN — APIXABAN 5 MILLIGRAM(S): 2.5 TABLET, FILM COATED ORAL at 22:58

## 2024-06-11 RX ADMIN — DOFETILIDE 250 MICROGRAM(S): 0.25 CAPSULE ORAL at 09:51

## 2024-06-11 RX ADMIN — ATORVASTATIN CALCIUM 40 MILLIGRAM(S): 80 TABLET, FILM COATED ORAL at 23:03

## 2024-06-11 RX ADMIN — DOFETILIDE 250 MICROGRAM(S): 0.25 CAPSULE ORAL at 22:58

## 2024-06-11 RX ADMIN — LOSARTAN POTASSIUM 25 MILLIGRAM(S): 100 TABLET, FILM COATED ORAL at 09:50

## 2024-06-11 RX ADMIN — Medication 50 MILLIGRAM(S): at 09:55

## 2024-06-11 NOTE — PROGRESS NOTE ADULT - ASSESSMENT
63 year old man with nonischemic cardiomyopathy, persistent atrial fibrillation with past history of MAY clot.   Admitted initially for WENDI to clear MAY and then initiation of Tikosyn with planned cardioversion for Thurs 6/13.        A/P:  Persistent AFib, HFrEF  s/p WENDI on 6/10/24, LVEF severely reduced 30-35%.  Continue tele monitoring.  s/p #1/6 doses Tikosyn 250mcg PO BID.  Will need total of 6 doses prior to discharge.  EKG 2 hrs after each dose Tikosyn, hold if QTc >500ms and call EP team for further instructions.  daily BMP / Mg, keep K>4, Mg>2  Plan for DCCV on Thursday 6/13/24.  NPO after MN on Wed  Continue uninterrupted Eliquis BID.  OOB, ambulate  Plan d/w pt, RN, and Dr. Stevenson   63 year old man with nonischemic cardiomyopathy, persistent atrial fibrillation with past history of MAY clot.   Admitted initially for WENDI to clear MAY and then initiation of Tikosyn with planned cardioversion for Thurs 6/13.        A/P:  Persistent AFib, HFrEF  s/p WENDI on 6/10/24, LVEF severely reduced 30-35%.  Continue tele monitoring.  s/p #1/6 doses Tikosyn 250mcg PO BID.  Will need total of 6 doses prior to discharge.  EKG 2 hrs after each dose Tikosyn, hold if QTc >500ms and call EP team for further instructions.  daily BMP / Mg, keep K>4, Mg>2  Plan for DCCV on Thursday 6/13/24.  NPO after MN on Wed  Continue uninterrupted Eliquis BID.  Continue Toprol 50 mg daily, Losartan.  OOB, ambulate  Plan d/w pt, RN, and Dr. Stevenson   63 year old man with nonischemic cardiomyopathy, persistent atrial fibrillation with past history of MAY clot.   Admitted initially for WENDI to clear MAY and then initiation of Tikosyn with planned cardioversion for Thurs 6/13.        A/P:  Persistent AFib, HFrEF  s/p WENDI on 6/10/24, LVEF severely reduced 30-35%.  Continue tele monitoring.  s/p #1/6 doses Tikosyn 250mcg PO BID.  Will need total of 6 doses prior to discharge.  EKG 2 hrs after each dose Tikosyn, hold if QTc >500ms and call EP team for further instructions.  daily BMP / Mg, keep K>4, Mg>2  Avoid other QT prolonging agents.  Plan for DCCV on Thursday 6/13/24.  NPO after MN on Wed  Continue uninterrupted Eliquis BID.  Continue Toprol 50 mg daily, Losartan.  OOB, ambulate  Plan d/w pt, RN, and Dr. Stevenson

## 2024-06-12 ENCOUNTER — APPOINTMENT (OUTPATIENT)
Dept: SURGERY | Facility: CLINIC | Age: 64
End: 2024-06-12

## 2024-06-12 PROBLEM — I50.20 HFREF (HEART FAILURE WITH REDUCED EJECTION FRACTION): Status: ACTIVE | Noted: 2024-04-24

## 2024-06-12 PROBLEM — I48.91 ATRIAL FIBRILLATION: Status: ACTIVE | Noted: 2021-06-04

## 2024-06-12 LAB
ANION GAP SERPL CALC-SCNC: 5 MMOL/L — SIGNIFICANT CHANGE UP (ref 5–17)
BUN SERPL-MCNC: 18 MG/DL — SIGNIFICANT CHANGE UP (ref 7–23)
CALCIUM SERPL-MCNC: 8.9 MG/DL — SIGNIFICANT CHANGE UP (ref 8.5–10.1)
CHLORIDE SERPL-SCNC: 108 MMOL/L — SIGNIFICANT CHANGE UP (ref 96–108)
CO2 SERPL-SCNC: 27 MMOL/L — SIGNIFICANT CHANGE UP (ref 22–31)
CREAT SERPL-MCNC: 0.91 MG/DL — SIGNIFICANT CHANGE UP (ref 0.5–1.3)
EGFR: 94 ML/MIN/1.73M2 — SIGNIFICANT CHANGE UP
GLUCOSE BLDC GLUCOMTR-MCNC: 100 MG/DL — HIGH (ref 70–99)
GLUCOSE BLDC GLUCOMTR-MCNC: 110 MG/DL — HIGH (ref 70–99)
GLUCOSE BLDC GLUCOMTR-MCNC: 121 MG/DL — HIGH (ref 70–99)
GLUCOSE BLDC GLUCOMTR-MCNC: 130 MG/DL — HIGH (ref 70–99)
GLUCOSE SERPL-MCNC: 110 MG/DL — HIGH (ref 70–99)
MAGNESIUM SERPL-MCNC: 2.1 MG/DL — SIGNIFICANT CHANGE UP (ref 1.6–2.6)
POTASSIUM SERPL-MCNC: 3.6 MMOL/L — SIGNIFICANT CHANGE UP (ref 3.5–5.3)
POTASSIUM SERPL-SCNC: 3.6 MMOL/L — SIGNIFICANT CHANGE UP (ref 3.5–5.3)
SODIUM SERPL-SCNC: 140 MMOL/L — SIGNIFICANT CHANGE UP (ref 135–145)

## 2024-06-12 PROCEDURE — 99233 SBSQ HOSP IP/OBS HIGH 50: CPT

## 2024-06-12 PROCEDURE — 97802 MEDICAL NUTRITION INDIV IN: CPT | Mod: 95

## 2024-06-12 PROCEDURE — 93010 ELECTROCARDIOGRAM REPORT: CPT | Mod: 76

## 2024-06-12 RX ORDER — POTASSIUM CHLORIDE 20 MEQ
40 PACKET (EA) ORAL EVERY 4 HOURS
Refills: 0 | Status: COMPLETED | OUTPATIENT
Start: 2024-06-12 | End: 2024-06-12

## 2024-06-12 RX ADMIN — Medication 50 MILLIGRAM(S): at 13:45

## 2024-06-12 RX ADMIN — Medication 40 MILLIEQUIVALENT(S): at 13:46

## 2024-06-12 RX ADMIN — APIXABAN 5 MILLIGRAM(S): 2.5 TABLET, FILM COATED ORAL at 21:21

## 2024-06-12 RX ADMIN — DOFETILIDE 250 MICROGRAM(S): 0.25 CAPSULE ORAL at 09:52

## 2024-06-12 RX ADMIN — DOFETILIDE 250 MICROGRAM(S): 0.25 CAPSULE ORAL at 21:21

## 2024-06-12 RX ADMIN — ATORVASTATIN CALCIUM 40 MILLIGRAM(S): 80 TABLET, FILM COATED ORAL at 21:21

## 2024-06-12 RX ADMIN — BUMETANIDE 2 MILLIGRAM(S): 0.25 INJECTION INTRAMUSCULAR; INTRAVENOUS at 09:52

## 2024-06-12 RX ADMIN — APIXABAN 5 MILLIGRAM(S): 2.5 TABLET, FILM COATED ORAL at 09:52

## 2024-06-12 RX ADMIN — Medication 40 MILLIEQUIVALENT(S): at 18:03

## 2024-06-12 RX ADMIN — LOSARTAN POTASSIUM 25 MILLIGRAM(S): 100 TABLET, FILM COATED ORAL at 09:51

## 2024-06-12 NOTE — PHYSICAL EXAM

## 2024-06-12 NOTE — PROGRESS NOTE ADULT - SUBJECTIVE AND OBJECTIVE BOX
HPI:  62yo male with obesity, non ischemic dilated cardiomyopathy, systolic CHF, persistent atrial fibrillation prior WENDI/Cardioversion in 2017, who is also undergoing weight loss surgery evaluation, recommended for rhythm management with antiarrhythmic drug tikosyn follow up by cardioversion.  He presented this morning for elective WENDI and admission for AAD tikosyn loading.        6/11/24: pt seen resting bed this AM, in NAD, denies any c/o symptoms.  s/p #1/6 doses of Tikosyn 250mcg PO BID.  Tolerating well thus far, his HRs are better this morning.  TELE: atrial fib 100-110 bpm, occasional PVC  EKG 6/11/24 s/p 1st dose: AF with RVR, 122 bpm, QRS 96ms, QTc 456ms      PAST MEDICAL & SURGICAL HISTORY:  Obesity  Afib  HTN (hypertension)  History of tonsillectomy      SOCIAL HISTORY: Denies tobacco, etoh abuse or illicit drug use    FAMILY HISTORY:  FH: CAD (coronary artery disease) (Father, Mother)        MEDICATIONS  (STANDING):  apixaban 5 milliGRAM(s) Oral every 12 hours  atorvastatin 40 milliGRAM(s) Oral at bedtime  buMETAnide 2 milliGRAM(s) Oral daily  dextrose 10% Bolus 125 milliLiter(s) IV Bolus once  dextrose 5%. 1000 milliLiter(s) (100 mL/Hr) IV Continuous <Continuous>  dextrose 5%. 1000 milliLiter(s) (50 mL/Hr) IV Continuous <Continuous>  dextrose 50% Injectable 25 Gram(s) IV Push once  dextrose 50% Injectable 12.5 Gram(s) IV Push once  dofetilide. 250 MICROGram(s) Oral every 12 hours  glucagon  Injectable 1 milliGRAM(s) IntraMuscular once  insulin lispro (ADMELOG) corrective regimen sliding scale   SubCutaneous three times a day before meals  losartan 25 milliGRAM(s) Oral daily  metoprolol succinate ER 50 milliGRAM(s) Oral daily    MEDICATIONS  (PRN):  dextrose Oral Gel 15 Gram(s) Oral once PRN Blood Glucose LESS THAN 70 milliGRAM(s)/deciliter      Allergies    No Known Allergies    Intolerances      Vital Signs Last 24 Hrs  T(C): 36.4 (11 Jun 2024 07:48), Max: 36.7 (10 Yinka 2024 20:18)  T(F): 97.5 (11 Jun 2024 07:48), Max: 98.1 (10 Yinka 2024 20:18)  HR: 78 (11 Jun 2024 07:48) (78 - 124)  BP: 132/82 (11 Jun 2024 07:48) (102/53 - 134/86)  BP(mean): --  RR: 18 (11 Jun 2024 07:48) (18 - 19)  SpO2: 95% (11 Jun 2024 07:48) (92% - 99%)    Parameters below as of 11 Jun 2024 07:48  Patient On (Oxygen Delivery Method): room air        REVIEW OF SYSTEMS:    CONSTITUTIONAL:  As per HPI.  HEENT:  Eyes:  No diplopia or blurred vision. ENT:  No earache, sore throat or runny nose.  CARDIOVASCULAR:  No pressure, squeezing, strangling, tightness, heaviness or aching about the chest, neck, axilla or epigastrium.  RESPIRATORY:  No cough, shortness of breath, PND or orthopnea.  GASTROINTESTINAL:  No nausea, vomiting or diarrhea.  GENITOURINARY:  No dysuria, frequency or urgency.  MUSCULOSKELETAL:  As per HPI.  SKIN:  No change in skin, hair or nails.  NEUROLOGIC:  No paresthesias, fasciculations, seizures or weakness.  PSYCHIATRIC:  No disorder of thought or mood.  ENDOCRINE:  No heat or cold intolerance, polyuria or polydipsia.  HEMATOLOGICAL:  No easy bruising or bleedings:  .     PHYSICAL EXAMINATION:    GENERAL APPEARANCE:  Pt. is not currently dyspneic, in no distress. Pt. is alert, oriented, and pleasant.  HEENT:  Pupils are normal and react normally. No icterus. Mucous membranes well colored.  NECK:  Supple. No lymphadenopathy. Jugular venous pressure not elevated. Carotids equal.   HEART:   Irregular S1S2.  There are no murmurs, rubs or gallops noted  CHEST:  Chest is clear to auscultation. Normal respiratory effort.  ABDOMEN:  Soft and nontender.   EXTREMITIES:  There is +2 LE edema.   SKIN:  No rash or significant lesions are noted.    I&O's Summary      LABS:                        12.1   8.62  )-----------( 270      ( 10 Yinka 2024 11:36 )             38.4     06-11    139  |  107  |  16  ----------------------------<  103<H>  4.2   |  28  |  0.92    Ca    8.7      11 Jun 2024 08:26  Mg     2.3     06-11              Urinalysis Basic - ( 11 Jun 2024 08:26 )    Color: x / Appearance: x / SG: x / pH: x  Gluc: 103 mg/dL / Ketone: x  / Bili: x / Urobili: x   Blood: x / Protein: x / Nitrite: x   Leuk Esterase: x / RBC: x / WBC x   Sq Epi: x / Non Sq Epi: x / Bacteria: x        CARDIAC TESTS:    < from: WENDI W or WO Ultrasound Enhancing Agent (06.10.24 @ 10:07) >  CONCLUSIONS:      1. Left ventricular cavity is mildly dilated. Left ventricular wall thickness is mildly increased. Left ventricular systolic function is moderately to severely decreased with an ejection fraction visually estimated at 30 to 35 %.   2. The left atrium is moderately dilated.   3. Moderate to severe mitral regurgitation.   4. LImited study performed given patient's severe obstructive sleep apnea.   5. No evidence of left atrial or left atrial appendage thrombus.      
HPI:  62yo male with obesity, non ischemic dilated cardiomyopathy, systolic CHF, persistent atrial fibrillation prior WENDI/Cardioversion in 2017, who is also undergoing weight loss surgery evaluation, recommended for rhythm management with antiarrhythmic drug tikosyn follow up by cardioversion.  He presented for elective WENDI and admission for AAD tikosyn loading.        Subjective: pt evaluated sitting up in chair this morning, does not offer any complaints, tolerating tikosyn today s/p dose #4    [id number] @1203pm after dose#4  atrial flutter [id number], QRS 98ms, QTc 388ms  TELE: afib rates 120-130bpm    MEDICATIONS  (STANDING):  apixaban 5 milliGRAM(s) Oral every 12 hours  atorvastatin 40 milliGRAM(s) Oral at bedtime  buMETAnide 2 milliGRAM(s) Oral daily  dextrose 10% Bolus 125 milliLiter(s) IV Bolus once  dextrose 5%. 1000 milliLiter(s) (100 mL/Hr) IV Continuous <Continuous>  dextrose 5%. 1000 milliLiter(s) (50 mL/Hr) IV Continuous <Continuous>  dextrose 50% Injectable 12.5 Gram(s) IV Push once  dextrose 50% Injectable 25 Gram(s) IV Push once  dofetilide. 250 MICROGram(s) Oral every 12 hours  glucagon  Injectable 1 milliGRAM(s) IntraMuscular once  insulin lispro (ADMELOG) corrective regimen sliding scale   SubCutaneous three times a day before meals  losartan 25 milliGRAM(s) Oral daily  metoprolol succinate ER 50 milliGRAM(s) Oral daily  potassium chloride    Tablet ER 40 milliEquivalent(s) Oral every 4 hours    MEDICATIONS  (PRN):  dextrose Oral Gel 15 Gram(s) Oral once PRN Blood Glucose LESS THAN 70 milliGRAM(s)/deciliter      Allergies    No Known Allergies      Vital Signs Last 24 Hrs  T(C): 36.8 (2024 08:23), Max: 36.8 (2024 08:23)  T(F): 98.2 (2024 08:23), Max: 98.2 (2024 08:23)  HR: 113 (2024 08:23) (62 - 113)  BP: 114/70 (2024 08:23) (114/70 - 123/75)  BP(mean): --  RR: 18 (2024 08:23) (18 - 18)  SpO2: 99% (2024 08:23) (94% - 99%)    Parameters below as of 2024 08:23  Patient On (Oxygen Delivery Method): room air        PHYSICAL EXAMINATION:  GENERAL: In no apparent distress, well nourished, and hydrated.  HEAD:  Atraumatic, Normocephalic  EYES: EOMI, PERRLA, conjunctiva and sclera clear  ENMT: No tonsillar erythema, exudates, or enlargement; Moist mucous membranes, Good dentition, No lesions  NECK: Supple and normal thyroid.  No JVD or carotid bruit.  Carotid pulse is 2+ bilaterally.  HEART: Regular rate and rhythm; No murmurs, rubs, or gallops.  PULMONARY: Clear to auscultation and perfusion.  No rales, wheezing, or rhonchi bilaterally.  ABDOMEN: Soft, Nontender, Nondistended; Bowel sounds present  EXTREMITIES:  2+ Peripheral Pulses, No clubbing, cyanosis, or edema  LYMPH: No lymphadenopathy noted  NEUROLOGICAL: Grossly nonfocal    LABS:        140  |  108  |  18  ----------------------------<  110<H>  3.6   |  27  |  0.91    Ca    8.9      2024 07:06  Mg     2.1     12        Urinalysis Basic - ( 2024 07:06 )    Color: x / Appearance: x / SG: x / pH: x  Gluc: 110 mg/dL / Ketone: x  / Bili: x / Urobili: x   Blood: x / Protein: x / Nitrite: x   Leuk Esterase: x / RBC: x / WBC x   Sq Epi: x / Non Sq Epi: x / Bacteria: x            RADIOLOGY & ADDITIONAL TESTS:  < from: WENDI W or WO Ultrasound Enhancing Agent (06.10.24 @ 10:07) >  CONCLUSIONS:      1. Left ventricular cavity is mildly dilated. Left ventricular wall thickness is mildly increased. Left ventricular systolic function is moderately to severely decreased with an ejection fraction visually estimated at 30 to 35 %.   2. The left atrium is moderately dilated.   3. Moderate to severe mitral regurgitation.   4. LImited study performed given patient's severe obstructive sleep apnea.   5. No evidence of left atrial or left atrial appendage thrombus.

## 2024-06-12 NOTE — PROGRESS NOTE ADULT - ASSESSMENT
63 year old man with nonischemic cardiomyopathy, persistent atrial fibrillation with past history of MAY clot.   Admitted initially for WENDI to clear MAY and then initiation of Tikosyn with planned cardioversion for Thurs 6/13.    A/P:  Persistent AFib, HFrEF  s/p WENDI on 6/10/24, LVEF severely reduced 30-35%.  Continue tele monitoring.  s/p #4/6 doses Tikosyn 250mcg PO BID.  Will need total of 6 doses prior to discharge.  EKG 2 hrs after each dose Tikosyn, hold if QTc >500ms and call EP team for dose adjustment  daily BMP / Mg, keep K>4, Mg>2  Avoid other QT prolonging agents.  Plan for DCCV on Thursday 6/13/24.  NPO after MN on Wed  Continue uninterrupted Eliquis BID.  Continue Toprol 50 mg daily, Losartan.

## 2024-06-12 NOTE — PROGRESS NOTE ADULT - NS ATTEND AMEND GEN_ALL_CORE FT
63 year old man with nonischemic cardiomyopathy, persistent atrial fibrillation with past history of MAY clot.   Admitted initially for WENDI to clear MAY and then initiation of Tikosyn with planned cardioversion for Thurs 6/13.
63 year old man with nonischemic cardiomyopathy, persistent atrial fibrillation with past history of MAY clot.   Admitted initially for WENDI to clear MAY and then initiation of Tikosyn with planned cardioversion for Thurs 6/13.  Tolerating Tikosyn.

## 2024-06-12 NOTE — CARDIOLOGY SUMMARY
[de-identified] : 9/8/21 : Atrial fibrillation  74 bpm \par  \par  6/7/18 : Atrial fibrillation 103 bpm.  [de-identified] : 4/8/24 : TTE Mild concentric left ventricular hypertrophy is present.  Moderate to severely reduced left ventricular systolic function.  Estimated left ventricular ejection fraction is 30-35 % with global  hypokinesis.  Unable to assess diastolic function due to atrial fibrillation. The right ventricle is normal in size, wall thickness, wall motion, and  contractility based on TAPSE.  The left atrium is moderately dilated.  The right atrium appears mildly dilated.  Moderate to severe mitral regurgitation is present.  Moderate to severe tricuspid valve regurgitation is present.  Mild pulmonary hypertension.  5/18/21 :  WENDI The mitral valve leaflets appear thin and normal.  Moderate (2+) mitral regurgitation is present.  A saline contrast study was performed and showed no evidence of a patent  foramen ovale.  Thrombus seen in the left atrial appendage.  Moderately reduced left ventricular systolic function.  Estimated left ventricular ejection fraction is 35 %.  5/16/21 : TTE  Endocardium is not well visualized; grossly the left ventricle appears  enlarged with mild systolic dysfunction. Visual  stimation of left  ventricle ejection fraction is 45%.  The IVC is dilated with decreased respiratory variation, suggesting  elevated right atrial pressure.  Mild to moderate mitral regurgitation.  Mild tricuspid valve regurgitation.  Mild pulmonary hypertension.  [de-identified] : 4/29/24 :  Diagnostic Conclusions:  1. Moderate mid RCA atherosclerosis (FFR Angio negative) along with moderate OM1 disease. 2. Elevated filling pressure (PCWP 35) with adequate perfusion.

## 2024-06-12 NOTE — ASSESSMENT
[FreeTextEntry1] : This is a 63-year-old man with nonischemic dilated cardiomyopathy EF 30%. ECG demonstrates Atrial fib with poor rate control.   During this visit, VIMAL WRAY  and I had a comprehensive discussion of arrhythmia management using principles of shared decision-making. This included a discussion of anti-arrhythmic medications including class I agents (e.g. flecainide), class III agents (e.g. amiodarone, dofetilide, sotalol, etc.), and both class II and IV agents (beta-blockers and calcium channel blockers). We reviewed the potentially life-threatening side effects of these medications, including (but not limited to) fatal tachyarrhythmias, pulmonary toxicity, liver toxicity, thyroid disorders. We also reviewed the requisite monitoring required of some of these medications. In addition, we reviewed ablative therapy, including the potential benefits and risks of catheter ablation. These risks include death, myocardial infarction, stroke, cardiac perforation, vascular injury, and other less severe complications. Mr. SHEPPARDIN  demonstrated a clear understanding of these issues during our discussion.

## 2024-06-13 ENCOUNTER — TRANSCRIPTION ENCOUNTER (OUTPATIENT)
Age: 64
End: 2024-06-13

## 2024-06-13 VITALS
HEART RATE: 64 BPM | RESPIRATION RATE: 15 BRPM | SYSTOLIC BLOOD PRESSURE: 111 MMHG | OXYGEN SATURATION: 95 % | DIASTOLIC BLOOD PRESSURE: 68 MMHG

## 2024-06-13 LAB
ANION GAP SERPL CALC-SCNC: 4 MMOL/L — LOW (ref 5–17)
BUN SERPL-MCNC: 21 MG/DL — SIGNIFICANT CHANGE UP (ref 7–23)
CALCIUM SERPL-MCNC: 9.3 MG/DL — SIGNIFICANT CHANGE UP (ref 8.5–10.1)
CHLORIDE SERPL-SCNC: 106 MMOL/L — SIGNIFICANT CHANGE UP (ref 96–108)
CO2 SERPL-SCNC: 28 MMOL/L — SIGNIFICANT CHANGE UP (ref 22–31)
CREAT SERPL-MCNC: 0.96 MG/DL — SIGNIFICANT CHANGE UP (ref 0.5–1.3)
EGFR: 88 ML/MIN/1.73M2 — SIGNIFICANT CHANGE UP
GLUCOSE BLDC GLUCOMTR-MCNC: 104 MG/DL — HIGH (ref 70–99)
GLUCOSE BLDC GLUCOMTR-MCNC: 97 MG/DL — SIGNIFICANT CHANGE UP (ref 70–99)
GLUCOSE SERPL-MCNC: 107 MG/DL — HIGH (ref 70–99)
MAGNESIUM SERPL-MCNC: 2.2 MG/DL — SIGNIFICANT CHANGE UP (ref 1.6–2.6)
POTASSIUM SERPL-MCNC: 4.3 MMOL/L — SIGNIFICANT CHANGE UP (ref 3.5–5.3)
POTASSIUM SERPL-SCNC: 4.3 MMOL/L — SIGNIFICANT CHANGE UP (ref 3.5–5.3)
SODIUM SERPL-SCNC: 138 MMOL/L — SIGNIFICANT CHANGE UP (ref 135–145)

## 2024-06-13 PROCEDURE — 92960 CARDIOVERSION ELECTRIC EXT: CPT

## 2024-06-13 PROCEDURE — 93010 ELECTROCARDIOGRAM REPORT: CPT

## 2024-06-13 RX ORDER — EMPAGLIFLOZIN 10 MG/1
0 TABLET, FILM COATED ORAL
Refills: 0 | DISCHARGE

## 2024-06-13 RX ORDER — DOFETILIDE 0.25 MG/1
1 CAPSULE ORAL
Qty: 0 | Refills: 0 | DISCHARGE
Start: 2024-06-13

## 2024-06-13 RX ORDER — EMPAGLIFLOZIN 10 MG/1
1 TABLET, FILM COATED ORAL
Qty: 0 | Refills: 0 | DISCHARGE

## 2024-06-13 RX ORDER — MAGNESIUM OXIDE 400 MG ORAL TABLET 241.3 MG
1 TABLET ORAL
Qty: 30 | Refills: 3
Start: 2024-06-13 | End: 2024-10-10

## 2024-06-13 RX ADMIN — DOFETILIDE 250 MICROGRAM(S): 0.25 CAPSULE ORAL at 08:36

## 2024-06-13 RX ADMIN — APIXABAN 5 MILLIGRAM(S): 2.5 TABLET, FILM COATED ORAL at 08:37

## 2024-06-13 NOTE — DISCHARGE NOTE NURSING/CASE MANAGEMENT/SOCIAL WORK - NSDCVIVACCINE_GEN_ALL_CORE_FT
influenza, injectable, quadrivalent, preservative free; 15-Sep-2017 18:08; Murphy Dobbs (RN); [employer]; [id number]; IntraMuscular; Deltoid Right.; 0.5 milliLiter(s); VIS (VIS Published: 07-Aug-2015, VIS Presented: 15-Sep-2017);

## 2024-06-13 NOTE — DISCHARGE NOTE PROVIDER - NSDCCPCAREPLAN_GEN_ALL_CORE_FT
PRINCIPAL DISCHARGE DIAGNOSIS  Diagnosis: Paroxysmal atrial fibrillation  Assessment and Plan of Treatment: - You had cardioversion by  on 6/13/24  -continue tikosyn 250mcg twice a day  - continue uninterrupted eliquis  -f/u in [facility] for EKG in 1 weeks      SECONDARY DISCHARGE DIAGNOSES  Diagnosis: CHF with cardiomyopathy  Assessment and Plan of Treatment: -continue medications as directed  -low sodium diet and weight monitoring reviewed  -f/u with cardiologist     PRINCIPAL DISCHARGE DIAGNOSIS  Diagnosis: Paroxysmal atrial fibrillation  Assessment and Plan of Treatment: - You had cardioversion by  on 6/13/24  -continue tikosyn 250mcg twice a day  - continue uninterrupted eliquis  -f/u in EP clinic on 6/20 @4:15pm  for EKG      SECONDARY DISCHARGE DIAGNOSES  Diagnosis: CHF with cardiomyopathy  Assessment and Plan of Treatment: -continue medications as directed  -low sodium diet and weight monitoring reviewed  -f/u with cardiologist

## 2024-06-13 NOTE — DISCHARGE NOTE NURSING/CASE MANAGEMENT/SOCIAL WORK - NSDCPEFALRISK_GEN_ALL_CORE
For information on Fall & Injury Prevention, visit: [email or web address] OR  [email or web address] OR  https://www.cdc.gov/steadi/patient.html

## 2024-06-13 NOTE — DISCHARGE NOTE PROVIDER - HOSPITAL COURSE
62yo male with obesity, non ischemic dilated cardiomyopathy, systolic CHF, persistent atrial fibrillation prior WENDI/Cardioversion in 2017, who is also undergoing weight loss surgery evaluation, recommended for rhythm management with antiarrhythmic drug tikosyn follow up by cardioversion.  He presented for elective WENDI and admission for AAD tikosyn loading.  63 year old man with nonischemic cardiomyopathy, persistent atrial fibrillation with past history of MAY clot.   Admitted initially for WENDI to clear MAY and then initiation of Tikosyn with planned cardioversion for Thurs 6/13.    A/P:  Persistent AFib, HFrEF  s/p WENDI on 6/10/24, LVEF severely reduced 30-35%.  Continue tele monitoring.  s/p #4/6 doses Tikosyn 250mcg PO BID.  Will need total of 6 doses prior to discharge.  EKG 2 hrs after each dose Tikosyn, hold if QTc >500ms and call EP team for dose adjustment  daily BMP / Mg, keep K>4, Mg>2  Avoid other QT prolonging agents.  Plan for DCCV on Thursday 6/13/24.  NPO after MN on Wed  Continue uninterrupted Eliquis BID.  Continue Toprol 50 mg daily, Losartan.       62yo male with obesity, non ischemic dilated cardiomyopathy, systolic CHF, persistent atrial fibrillation prior WENDI/Cardioversion in 2017, who is also undergoing weight loss surgery evaluation, recommended for rhythm management with antiarrhythmic drug tikosyn follow up by cardioversion.  He presented for elective WENDI and admission for AAD tikosyn loading.  63 year old man with nonischemic cardiomyopathy, persistent atrial fibrillation with past history of MAY clot.   Admitted initially for WENDI to clear MAY and then initiation of Tikosyn, s/p cardioversion today  EKG post DCCV : SR 67bpm. NM 194ms, QRS 104ms, QT 466ms, QTC 492ms  K+ 4.3 Mg 2.2  A/P:  Persistent AFib, chronic HFrEF  s/p WENDI on 6/10/24, LVEF severely reduced 30-35%. no LA/MAY thrombus  s/p successful cardioversion today after tikosyn loading  - continue tikosyn 250mcg BID  - continue toprol 50mg daily  - uninterrupted eliquis 5mg BID  - supp K+ 40meq/Mg 400mg daily  - d/c home today, f/u for EKG in 1 week  -f/u with  7/12/24 12pm

## 2024-06-13 NOTE — DISCHARGE NOTE PROVIDER - NSDCFUSCHEDAPPT_GEN_ALL_CORE_FT
[facility] [address]  Scheduled Appointment: 06/17/2024    [facility] Becerra  [facility] [address]  Scheduled Appointment: 06/20/2024    [facility] [address]  Scheduled Appointment: 06/24/2024    Bryn Stevenson  [address]  Scheduled Appointment: 07/12/2024     [facility]  [facility] [address]  Scheduled Appointment: 06/17/2024    [facility] [address]  Scheduled Appointment: 06/20/2024    [facility] Eric Colon  Scheduled Appointment: 06/20/2024    [facility] [address]  Scheduled Appointment: 06/24/2024    Bryn Stevenson  [facility] Eric Colon  Scheduled Appointment: 07/12/2024

## 2024-06-13 NOTE — DISCHARGE NOTE PROVIDER - PROVIDER TOKENS
PROVIDER:[TOKEN:[[id number]],SCHEDULEDAPPT:[07/12/2024],SCHEDULEDAPPTTIME:[12:00 PM],ESTABLISHEDPATIENT:[T]] PROVIDER:[TOKEN:[[id number]],SCHEDULEDAPPT:[07/12/2024],SCHEDULEDAPPTTIME:[12:00 PM],ESTABLISHEDPATIENT:[T]],PROVIDER:[TOKEN:[[id number]],FOLLOWUP:[1 month],ESTABLISHEDPATIENT:[T]]

## 2024-06-13 NOTE — DISCHARGE NOTE NURSING/CASE MANAGEMENT/SOCIAL WORK - PATIENT PORTAL LINK FT
You can access the FollowMyHealth Patient Portal offered by [facility] by registering at the following website: [email or web address]. By joining "[employer]"’s FollowMyHealth portal, you will also be able to view your health information using other applications (apps) compatible with our system.

## 2024-06-13 NOTE — PROCEDURAL SAFETY CHECKLIST WITH OR WITHOUT SEDATION - NSPREPROCEDSEDAT_GEN_ALL_CORE
What Is The Reason For Today's Visit?: Full Body Skin Examination with No Concerns
What Is The Reason For Today's Visit? (Being Monitored For X): concerning skin lesions on an annual basis
with sedation
with sedation

## 2024-06-13 NOTE — DISCHARGE NOTE PROVIDER - NSDCMRMEDTOKEN_GEN_ALL_CORE_FT
apixaban 5 mg oral tablet: 1 tab(s) orally 2 times a day  atorvastatin 40 mg oral tablet: 1 tab(s) orally once a day  bumetanide 2 mg oral tablet: 1 tab(s) orally once a day  jardiance: diabetes  losartan 25 mg oral tablet: 1 tab(s) orally once a day  metoprolol succinate 50 mg oral tablet, extended release: 1 tab(s) orally once a day   apixaban 5 mg oral tablet: 1 tab(s) orally 2 times a day  atorvastatin 40 mg oral tablet: 1 tab(s) orally once a day  bumetanide 2 mg oral tablet: 1 tab(s) orally once a day  dofetilide 250 mcg oral capsule: 1 cap(s) orally every 12 hours  jardiance: diabetes  Jardiance 10 mg oral tablet: 1 tab(s) orally once a day  Jardiance 10 mg oral tablet: 1 tab(s) orally once a day  losartan 25 mg oral tablet: 1 tab(s) orally once a day  magnesium oxide 400 mg oral tablet: 1 tab(s) orally once a day  metoprolol succinate 50 mg oral tablet, extended release: 1 tab(s) orally once a day  potassium chloride 20 mEq oral tablet, extended release: 2 tab(s) orally once a day

## 2024-06-13 NOTE — PACU DISCHARGE NOTE - COMMENTS
pt aaox4, pt denies any symptoms, afib on monitor - 112 afib. report given to estela SMITH on 3E, awaiting for transport.
pt discharged to 3E report given to laurel SMITH. discharge instructions given to pt all questions and concerns addressed with pt. placed back on tele box for transport via stretcher.

## 2024-06-13 NOTE — DISCHARGE NOTE PROVIDER - CARE PROVIDERS DIRECT ADDRESSES
,[email or web address]

## 2024-06-13 NOTE — DISCHARGE NOTE PROVIDER - CARE PROVIDER_API CALL
Bryn Stevenson.  Cardiac Electrophysiology  [address] [place] [address]  Phone: [phone]  Fax: [phone]  Established Patient  Scheduled Appointment: 07/12/2024 12:00 PM   Bryn Stevenson.  Cardiac Electrophysiology  [address] [place] [address]  Phone: [phone]  Fax: [phone]  Established Patient  Scheduled Appointment: 07/12/2024 12:00 PM    Irvin Aragon  Interventional Cardiology  [facility] [facility], [address]  [place] [address]  Phone: [phone]  Fax: [phone]  Established Patient  Follow Up Time: 1 month

## 2024-06-14 PROBLEM — E66.01 MORBID OBESITY: Status: ACTIVE | Noted: 2024-05-07

## 2024-06-14 RX ORDER — POTASSIUM CHLORIDE 20 MEQ
2 PACKET (EA) ORAL
Qty: 60 | Refills: 3
Start: 2024-06-14 | End: 2024-10-11

## 2024-06-17 ENCOUNTER — TRANSCRIPTION ENCOUNTER (OUTPATIENT)
Age: 64
End: 2024-06-17

## 2024-06-18 ENCOUNTER — APPOINTMENT (OUTPATIENT)
Dept: SURGERY | Facility: CLINIC | Age: 64
End: 2024-06-18

## 2024-06-18 DIAGNOSIS — G47.33 OBSTRUCTIVE SLEEP APNEA (ADULT) (PEDIATRIC): ICD-10-CM

## 2024-06-18 DIAGNOSIS — Z79.01 LONG TERM (CURRENT) USE OF ANTICOAGULANTS: ICD-10-CM

## 2024-06-18 DIAGNOSIS — I48.19 OTHER PERSISTENT ATRIAL FIBRILLATION: ICD-10-CM

## 2024-06-18 DIAGNOSIS — E11.9 TYPE 2 DIABETES MELLITUS WITHOUT COMPLICATIONS: ICD-10-CM

## 2024-06-18 DIAGNOSIS — Z86.79 PERSONAL HISTORY OF OTHER DISEASES OF THE CIRCULATORY SYSTEM: ICD-10-CM

## 2024-06-18 DIAGNOSIS — I11.0 HYPERTENSIVE HEART DISEASE WITH HEART FAILURE: ICD-10-CM

## 2024-06-18 DIAGNOSIS — I42.8 OTHER CARDIOMYOPATHIES: ICD-10-CM

## 2024-06-18 DIAGNOSIS — I34.0 NONRHEUMATIC MITRAL (VALVE) INSUFFICIENCY: ICD-10-CM

## 2024-06-18 DIAGNOSIS — I48.92 UNSPECIFIED ATRIAL FLUTTER: ICD-10-CM

## 2024-06-18 DIAGNOSIS — Z79.84 LONG TERM (CURRENT) USE OF ORAL HYPOGLYCEMIC DRUGS: ICD-10-CM

## 2024-06-18 DIAGNOSIS — E66.9 OBESITY, UNSPECIFIED: ICD-10-CM

## 2024-06-18 DIAGNOSIS — Z82.49 FAMILY HISTORY OF ISCHEMIC HEART DISEASE AND OTHER DISEASES OF THE CIRCULATORY SYSTEM: ICD-10-CM

## 2024-06-18 DIAGNOSIS — I50.22 CHRONIC SYSTOLIC (CONGESTIVE) HEART FAILURE: ICD-10-CM

## 2024-06-18 DIAGNOSIS — I42.0 DILATED CARDIOMYOPATHY: ICD-10-CM

## 2024-06-18 DIAGNOSIS — I49.3 VENTRICULAR PREMATURE DEPOLARIZATION: ICD-10-CM

## 2024-06-19 LAB
SAO2 % BLD: 64.7 % — SIGNIFICANT CHANGE UP (ref 60–90)
SAO2 % BLDA: 94.9 % — SIGNIFICANT CHANGE UP (ref 94–98)

## 2024-06-20 ENCOUNTER — APPOINTMENT (OUTPATIENT)
Dept: ELECTROPHYSIOLOGY | Facility: CLINIC | Age: 64
End: 2024-06-20
Payer: COMMERCIAL

## 2024-06-20 ENCOUNTER — APPOINTMENT (OUTPATIENT)
Dept: SURGERY | Facility: CLINIC | Age: 64
End: 2024-06-20
Payer: COMMERCIAL

## 2024-06-20 ENCOUNTER — RX RENEWAL (OUTPATIENT)
Age: 64
End: 2024-06-20

## 2024-06-20 ENCOUNTER — NON-APPOINTMENT (OUTPATIENT)
Age: 64
End: 2024-06-20

## 2024-06-20 VITALS
BODY MASS INDEX: 41.75 KG/M2 | SYSTOLIC BLOOD PRESSURE: 115 MMHG | DIASTOLIC BLOOD PRESSURE: 78 MMHG | WEIGHT: 315 LBS | OXYGEN SATURATION: 92 % | HEART RATE: 82 BPM | HEIGHT: 73 IN

## 2024-06-20 VITALS
BODY MASS INDEX: 41.75 KG/M2 | HEIGHT: 73 IN | OXYGEN SATURATION: 98 % | RESPIRATION RATE: 17 BRPM | WEIGHT: 315 LBS | TEMPERATURE: 98 F

## 2024-06-20 DIAGNOSIS — E66.01 MORBID (SEVERE) OBESITY DUE TO EXCESS CALORIES: ICD-10-CM

## 2024-06-20 PROCEDURE — 93000 ELECTROCARDIOGRAM COMPLETE: CPT

## 2024-06-20 PROCEDURE — 99214 OFFICE O/P EST MOD 30 MIN: CPT

## 2024-06-20 RX ORDER — EMPAGLIFLOZIN 10 MG/1
10 TABLET, FILM COATED ORAL DAILY
Qty: 30 | Refills: 0 | Status: ACTIVE | COMMUNITY
Start: 2024-05-08 | End: 1900-01-01

## 2024-06-20 RX ORDER — SEMAGLUTIDE 0.68 MG/ML
2 INJECTION, SOLUTION SUBCUTANEOUS WEEKLY
Qty: 1 | Refills: 0 | Status: DISCONTINUED | COMMUNITY
Start: 2024-05-29 | End: 2024-06-20

## 2024-06-20 NOTE — HISTORY OF PRESENT ILLNESS
[de-identified] : VIMAL  is a 64 year male initially seen by Dr. Hernandez for weight loss surgery. He has struggled with his weight for the majority of his life.  With aggressive dieting he has lost up to 120 pounds.  He has been able to previously keep weight off for years at a time, but now is developing weight-related comorbidities and realizes he needs a more sustainable solution.   His medical history includes HTN, T2DM, OMAYRA on CPAP, Afib on Eliquis  Thus far, patient has seen Psych, Nutrition, Cardiology, and Pulmonary Trying approval for Ozempic to reduce weight prior to potential surgery in August

## 2024-06-20 NOTE — PLAN
[FreeTextEntry1] : Continue medical weight loss efforts Check upper GI; will make a decision regarding the need for endoscopy pending the results of the upper GI Continue to work with cardiology re: anticoagulation, atrial fibrillation and heart failure Should have an interval echocardiogram closer to the date of surgery  Would greatly benefit from a sleeve gastrectomy provided there are no absolute medical or psychiatric contraindications. The risks benefits and alternatives were discussed at length with demonstration and all of his questions were answered. The patient appears to understand and wishes to proceed.  Follow-up 2 to 4 weeks prior to surgery.

## 2024-06-20 NOTE — PHYSICAL EXAM
[TextEntry] : Gen.: Patient is alert and oriented and in no acute distress. Eye: Pupils are equal round and reactive; extraocular muscles are intact. HEENT: Normocephalic atraumatic. Thyro-cervical exam: Trachea is midline.  There is no obvious thyromegaly or cervical adenopathy. Pulmonary: Patient moves air well. Cardiovascular: Perfusion appears good. Abdomen: The abdomen is soft, nontender, nondistended. There is no obvious hernia.  He has a significant pannus. Musculoskeletal: There is no obvious musculoskeletal deformity. Neurologic: There are no focal neurologic findings. Psychiatric: The patient is appropriate with a normal affect.  Skin:  The skin is warm, dry and intact.  [Obese, well nourished, in no acute distress] : obese, well nourished, in no acute distress [Normal] : affect appropriate [de-identified] : Irregularly, irregular [de-identified] : Normoactive bowel sounds, no hepatosplenomegaly, no masses, non-tender. [de-identified] : Understood consultation

## 2024-06-20 NOTE — HISTORY OF PRESENT ILLNESS
[None] : The patient complains of no symptoms [de-identified] : 62yo male with obesity, non ischemic dilated cardiomyopathy, systolic CHF, persistent atrial fibrillation prior WENDI/Cardioversion in 2017, who is also undergoing weight loss surgery evaluation, she was started antiarrhythmic drug tikosyn and underwent cardioversion on 6/13/24,  He presents today for follow up EKG, he called 2 days after cardioversion stating that his apple watch alerted that he was back in afib, he is otherwise asymptomatic.

## 2024-06-20 NOTE — ASSESSMENT
[FreeTextEntry1] : Patient was counseled today on behavioral, dietary and physical means to try to reduce weight.  Plan sleeve gastrectomy early August pending clearances and testing He is aware he will require UGI - if normal no need for EGD Cologuard 2 months ago was normal  Pre-op weight: 388 lbs (BMI 49) Current weight: 377 lbs

## 2024-06-20 NOTE — ASSESSMENT
[FreeTextEntry1] : 64yo male with obesity, non ischemic dilated cardiomyopathy, systolic CHF, persistent atrial fibrillation prior WENDI/Cardioversion in 2017, who is also undergoing weight loss surgery evaluation, she was started antiarrhythmic drug tikosyn and underwent cardioversion on 6/13/24,  she presents today for follow up EKG.   EKG shows he is back in afib. will remain on tikosyn for now,  pt will start using CPAP, and has been actively loosing weight.  planned for bariatric surgery discussed role of ablation in maintaining NSR and will recommended weighting post bariatric procedure to do the ablation.  will follow up with Dr Stevenson next month .

## 2024-06-21 NOTE — ASU PREOP CHECKLIST - ADVANCE DIRECTIVE ADDRESSED/READDRESSED
Noted.    Please get update on anxiety.    Would recommend:    Starting Gabapentin 300 mg Take 1 capsule nightly for 7 days then increase to 1 capsule two times daily thereafter.   done

## 2024-06-24 ENCOUNTER — APPOINTMENT (OUTPATIENT)
Dept: SURGERY | Facility: CLINIC | Age: 64
End: 2024-06-24

## 2024-06-24 PROCEDURE — 97802 MEDICAL NUTRITION INDIV IN: CPT | Mod: 95

## 2024-07-03 ENCOUNTER — APPOINTMENT (OUTPATIENT)
Dept: CARDIOLOGY | Facility: CLINIC | Age: 64
End: 2024-07-03
Payer: COMMERCIAL

## 2024-07-03 VITALS
HEIGHT: 73 IN | BODY MASS INDEX: 41.75 KG/M2 | WEIGHT: 315 LBS | OXYGEN SATURATION: 94 % | HEART RATE: 63 BPM | SYSTOLIC BLOOD PRESSURE: 124 MMHG | DIASTOLIC BLOOD PRESSURE: 69 MMHG

## 2024-07-03 PROCEDURE — 99215 OFFICE O/P EST HI 40 MIN: CPT | Mod: 25

## 2024-07-03 PROCEDURE — 93000 ELECTROCARDIOGRAM COMPLETE: CPT

## 2024-07-03 RX ORDER — SACUBITRIL AND VALSARTAN 24; 26 MG/1; MG/1
24-26 TABLET, FILM COATED ORAL TWICE DAILY
Qty: 60 | Refills: 3 | Status: ACTIVE | COMMUNITY
Start: 2024-07-03 | End: 1900-01-01

## 2024-07-10 ENCOUNTER — APPOINTMENT (OUTPATIENT)
Dept: CARDIOLOGY | Facility: CLINIC | Age: 64
End: 2024-07-10
Payer: COMMERCIAL

## 2024-07-10 VITALS
HEIGHT: 73 IN | SYSTOLIC BLOOD PRESSURE: 124 MMHG | BODY MASS INDEX: 41.75 KG/M2 | HEART RATE: 103 BPM | DIASTOLIC BLOOD PRESSURE: 72 MMHG | OXYGEN SATURATION: 96 % | RESPIRATION RATE: 16 BRPM | WEIGHT: 315 LBS

## 2024-07-10 LAB
ALBUMIN SERPL ELPH-MCNC: 3.9 G/DL
ALP BLD-CCNC: 86 U/L
ALT SERPL-CCNC: 25 U/L
ANION GAP SERPL CALC-SCNC: 15 MMOL/L
AST SERPL-CCNC: 29 U/L
BILIRUB SERPL-MCNC: 0.6 MG/DL
BUN SERPL-MCNC: 26 MG/DL
CALCIUM SERPL-MCNC: 9.1 MG/DL
CHLORIDE SERPL-SCNC: 100 MMOL/L
CHOLEST SERPL-MCNC: 150 MG/DL
CO2 SERPL-SCNC: 20 MMOL/L
CREAT SERPL-MCNC: 1.1 MG/DL
EGFR: 74 ML/MIN/1.73M2
ESTIMATED AVERAGE GLUCOSE: 143 MG/DL
GLUCOSE SERPL-MCNC: 107 MG/DL
HBA1C MFR BLD HPLC: 6.6 %
HDLC SERPL-MCNC: 33 MG/DL
LDLC SERPL CALC-MCNC: 100 MG/DL
NONHDLC SERPL-MCNC: 117 MG/DL
NT-PROBNP SERPL-MCNC: 483 PG/ML
POTASSIUM SERPL-SCNC: 4.8 MMOL/L
PROT SERPL-MCNC: 7.7 G/DL
SODIUM SERPL-SCNC: 135 MMOL/L
TRIGL SERPL-MCNC: 93 MG/DL

## 2024-07-10 PROCEDURE — 99215 OFFICE O/P EST HI 40 MIN: CPT | Mod: 25

## 2024-07-10 PROCEDURE — 93000 ELECTROCARDIOGRAM COMPLETE: CPT

## 2024-07-12 ENCOUNTER — APPOINTMENT (OUTPATIENT)
Dept: ELECTROPHYSIOLOGY | Facility: CLINIC | Age: 64
End: 2024-07-12
Payer: COMMERCIAL

## 2024-07-12 ENCOUNTER — NON-APPOINTMENT (OUTPATIENT)
Age: 64
End: 2024-07-12

## 2024-07-12 VITALS
OXYGEN SATURATION: 94 % | HEIGHT: 73 IN | DIASTOLIC BLOOD PRESSURE: 71 MMHG | SYSTOLIC BLOOD PRESSURE: 153 MMHG | HEART RATE: 79 BPM | WEIGHT: 315 LBS | BODY MASS INDEX: 41.75 KG/M2

## 2024-07-12 DIAGNOSIS — I48.91 UNSPECIFIED ATRIAL FIBRILLATION: ICD-10-CM

## 2024-07-12 DIAGNOSIS — I50.20 UNSPECIFIED SYSTOLIC (CONGESTIVE) HEART FAILURE: ICD-10-CM

## 2024-07-12 PROCEDURE — G2211 COMPLEX E/M VISIT ADD ON: CPT | Mod: NC,1L

## 2024-07-12 PROCEDURE — 99214 OFFICE O/P EST MOD 30 MIN: CPT

## 2024-07-18 ENCOUNTER — RX RENEWAL (OUTPATIENT)
Age: 64
End: 2024-07-18

## 2024-07-26 ENCOUNTER — RX RENEWAL (OUTPATIENT)
Age: 64
End: 2024-07-26

## 2024-08-21 ENCOUNTER — APPOINTMENT (OUTPATIENT)
Dept: CARDIOLOGY | Facility: CLINIC | Age: 64
End: 2024-08-21
Payer: COMMERCIAL

## 2024-08-21 VITALS — DIASTOLIC BLOOD PRESSURE: 76 MMHG | SYSTOLIC BLOOD PRESSURE: 123 MMHG | OXYGEN SATURATION: 98 % | HEART RATE: 64 BPM

## 2024-08-21 PROCEDURE — 93000 ELECTROCARDIOGRAM COMPLETE: CPT

## 2024-08-21 PROCEDURE — 99215 OFFICE O/P EST HI 40 MIN: CPT | Mod: 25

## 2024-08-22 NOTE — DISCUSSION/SUMMARY
[FreeTextEntry1] : 60 year old man with Obesity, dilated cardiomyopathy with EF 40% (2017, thought to be from atrial fibrillation), HF, paroxysmal Atrial fib s/p prior WENDI cardioversion (2017), possible OMAYRA, COVID in December 2020 who presents to me today for first time for cardiac care.  HFrEF 45% presumed to be from pAF  Euvolemic on exam. Long standing cardiomyopathy with TEEs recently revealing mild LV dysfunction as well. Moderate-severe MR.  -continue metoprolol 50 mg XL  -c/w Entresto 24-26 mg bid  - hold Bumex for now in given recent surgery  -TTE 4/2024: further reduction in EF to 30-35%, moderate-severe MR. Dilated IVC.   CAD Moderate diffuse atherosclerosis on LHC/RHC 2024 -c/w atorvastatin 40 mg  - on DOAC, no plan for aspirin at this time - will measure lipids in 3 months to ensure improvement. - advise to resume Jardiance    Atrial Fibrillation  -c/w apixaban 5 mg q12 hours - continue Metoprolol for rate control  -s/p tikosyn load/DCCV, ineffective  -weight loss first and then ablation - tried DCCV at [place], still in afib   Morbid Obesity: s/p bariatric surgery in last week. doing well   a1c uptrending 6.6 [id number] sleep study for OSA_ CPAP delivery pending  Pt's weight is stable at 388lb, now at 377, now 366 after surgery  On bariatric diet   Follow up in 1 month  I, Dr. Aragon personally performed the evaluation and management (E/M) services for this established patient who presents today with (a) new problem(s)/exacerbation of (an) existing condition(s).  That E/M includes conducting the clinically appropriate interval history &/or exam, assessing all new/exacerbated conditions, and establishing a new plan of care.  Today, my LEROY, [employer], was here to observe &/or participated in my evaluation and management service for this new problem/exacerbated condition and follow the plan of care established by me going forward. EKG used in decision making of patient.   Differential diagnosis and plan of care discussed with patient after the evaluation.  Counseling on diet, exercise, and medication compliance was done. Counseling on smoking and alcohol cessation was offered when appropriate. Pain assessed and judicious use of narcotics when appropriate was discussed. Importance of fall prevention discussed. Advanced care planning was discussed with patient and family.       [EKG obtained to assist in diagnosis and management of assessed problem(s)] : EKG obtained to assist in diagnosis and management of assessed problem(s)

## 2024-08-22 NOTE — HISTORY OF PRESENT ILLNESS
[FreeTextEntry1] : Jung Terry is a 60 year old man with morbid obesity, dilated cardiomyopathy with EF 40% (2017, thought to be from atrial fibrillation), HF, paroxysmal Atrial fib s/p prior WENDI cardioversion (2017), possible OMAYRA, COVID in December 2020 who presents for follow up.   Had cath last year. He was found to have diffuse moderate atherosclerosis in his epicardial vessels with no obstructive lesions.   He denies bleeding, bruising, chest pain, confusion, epistaxis, falls/injuries, hematochezia, hematemesis, hematuria, hemoptysis, melena, Pain/swelling in LE, or SOB   Regarding cardiovascular health, he has not been exercising as much and has gained weight. He reports more SOB recently with [place] the airport.  Lasix 40 mg usually, but since then took it BID. It did help. No PND, waking up from urination.   TTE 4/2024: further reduction in EF to 30-35%, moderate-severe MR. Dilated IVC.  LDL 57, .  A1C 6.6  Today patient returning for follow post TTE and sleep study - feels tired and SOB, frustrated with not seeing weight loss despite counting calories and cutting portion. Reported sleep study, failed, CPAP machine pending delivery. complaint with medications; inconsistent with lasix due to increased urination and interferes with social; does take once a day instead of 2x day;  reports PSA elevated to 5.6 urology evaluation pending  PCP recommended bariatric surgery eval; unable to get weight loss meds approved.  patient experiencing GREY, sleeping in recliner sometimes.   5/29/24 Today's visit is to discuss further pursing rhythm control of his afib via hospital stay this 6/3/24 for tikosyn load. He is currently pursing bariatric surgery possible august date and working with a nutritionist, would like to consider trying to get medication approved again. Pt has been more compliant with lasix, weight is stable at 388lb.  7/3/2024: Recent Tikoysn loaded, worked for 2 days, however back in atrial fibrillation. Seen by EP who recommended more weight loss prior to ablation.  Patient pending gastric surgery in [place].   7/10/24  Pt follow up today after beginning Entresto. Pt c/o intermittent dry cough since beginning Entresto ( mostly at night), increased fatigue, and he has noticed on his fit bit his heart has been racing more but he  is asymptomatic. Pt began pre op diet July 2nd for bariatric sugery that he is having in [place] on August 13, 2024.   Pt finally received CPAP but is having difficulty using it,.   8/21/24: Pt is here today after gastric sleeve in last week. s/p DCCV in setting of RVR prior to the surgery at [place]- unsuccessful. Overall, Pt is doing well. Denies chest pain, SOB or palpitations.

## 2024-08-22 NOTE — HISTORY OF PRESENT ILLNESS
[FreeTextEntry1] : Jung Terry is a 60 year old man with morbid obesity, dilated cardiomyopathy with EF 40% (2017, thought to be from atrial fibrillation), HF, paroxysmal Atrial fib s/p prior WENDI cardioversion (2017), possible OMAYRA, COVID in December 2020 who presents for follow up.   Had cath last year. He was found to have diffuse moderate atherosclerosis in his epicardial vessels with no obstructive lesions.   He denies bleeding, bruising, chest pain, confusion, epistaxis, falls/injuries, hematochezia, hematemesis, hematuria, hemoptysis, melena, Pain/swelling in LE, or SOB   Regarding cardiovascular health, he has not been exercising as much and has gained weight. He reports more SOB recently with [place] getting [place] the airport.  Lasix 40 mg usually, but since then took it BID. It did help. No PND, waking up from urination.   TTE 4/2024: further reduction in EF to 30-35%, moderate-severe MR. Dilated IVC.  LDL 57, .  A1C 6.6  Today patient returning for follow post TTE and sleep study - feels tired and SOB, frustrated with not seeing weight loss despite counting calories and cutting portion. Reported sleep study, failed, CPAP machine pending delivery. complaint with medications; inconsistent with lasix due to increased urination and interferes with social; does take once a day instead of 2x day;  reports PSA elevated to 5.6 urology evaluation pending  PCP recommended bariatric surgery eval; unable to get weight loss meds approved.  patient experiencing GREY, sleeping in recliner sometimes.   5/29/24 Today's visit is to discuss further pursing rhythm control of his afib via hospital stay this 6/3/24 for tikosyn load. He is currently pursing bariatric surgery possible august date and working with a nutritionist, would like to consider trying to get medication approved again. Pt has been more compliant with lasix, weight is stable at 388lb.  7/3/2024: Recent Tikoysn loaded, worked for 2 days, however back in atrial fibrillation. Seen by EP who recommended more weight loss prior to ablation.  Patient pending gastric surgery in [place].   7/10/24  Pt follow up today after beginning Entresto. Pt c/o intermittent dry cough since beginning Entresto ( mostly at night), increased fatigue, and he has noticed on his fit bit his heart has been racing more but he  is asymptomatic. Pt began pre op diet July 2nd for bariatric sugery that he is having in [place] on August 13, 2024.   Pt finally received CPAP but is having difficulty using it,.   8/21/24: Pt is here today after gastric sleeve in last week. s/p DCCV in setting of RVR prior to the surgery at [place]- unsuccessful. Overall, Pt is doing well. Denies chest pain, SOB or palpitations.

## 2024-08-22 NOTE — CARDIOLOGY SUMMARY
[de-identified] : 4/24/24: AFIB unchanged from prior  [de-identified] : TTE 4/2024: further reduction in EF to 30-35%, moderate-severe MR. Dilated IVC.   WENDI:  1. Left ventricular cavity is mildly dilated. Left ventricular wall thickness is mildly increased. Left ventricular systolic function is moderately to severely decreased with an ejection fraction visually estimated at 30 to 35 %.  2. The left atrium is moderately dilated.  3. Moderate to severe mitral regurgitation.  4. LImited study performed given patient's severe obstructive sleep apnea.  5. No evidence of left atrial or left atrial appendage thrombus. [de-identified] : Cath 9/30/3021:\par  Angiographic Findings\par  Cardiac Arteries and Lesion Findings\par  LMCA: Mild diffuse atherosclerosis\par  LAD: Diffuse mild to moderate atherosclerosis till apex\par  Prox LAD: 30% stenosis.Pre procedure SWATHI III flow was noted.\par  LCx: Diffuse mild to moderate atherosclerosis.\par  Prox CX: 30% stenosis.\par  RCA: Diffuse mild to moderate atherosclerosis.\par  Mid RCA: 40% stenosis 24 mm length.\par  Impression\par  Diagnostic Conclusions\par  Non-obstructive coronary artery disease. LVEDP of 16.\par  Recommendations\par  Recommend aggressive medical management of CAD as per ACC/AHA guidelines\par  with risk factor modification including weight loss. Patient to follow up\par  closely in the office.\par  Estimated Blood Loss:4ml.\par  Complications:\par  No complication.\par

## 2024-08-22 NOTE — DISCUSSION/SUMMARY
[FreeTextEntry1] : 60 year old man with Obesity, dilated cardiomyopathy with EF 40% (2017, thought to be from atrial fibrillation), HF, paroxysmal Atrial fib s/p prior WENDI cardioversion (2017), possible OMAYRA, COVID in December 2020 who presents to me today for first time for cardiac care.  HFrEF 45% presumed to be from pAF  Euvolemic on exam. Long standing cardiomyopathy with TEEs recently revealing mild LV dysfunction as well. Moderate-severe MR.  -continue metoprolol 50 mg XL  -c/w Entresto 24-26 mg bid  - hold Bumex for now in given recent surgery  -TTE 4/2024: further reduction in EF to 30-35%, moderate-severe MR. Dilated IVC.   CAD Moderate diffuse atherosclerosis on LHC/RHC 2024 -c/w atorvastatin 40 mg  - on DOAC, no plan for aspirin at this time - will measure lipids in 3 months to ensure improvement. - advise to resume Jardiance    Atrial Fibrillation  -c/w apixaban 5 mg q12 hours - continue Metoprolol for rate control  -s/p tikosyn load/DCCV, ineffective  -weight loss first and then ablation - tried DCCV at [place], still in afib   Morbid Obesity: s/p bariatric surgery in last week. doing well   a1c uptrending 6.6 B0peumbhnsc sleep study for OSA_ CPAP delivery pending  Pt's weight is stable at 388lb, now at 377, now 366 after surgery  On bariatric diet   Follow up in 1 month  I, Dr. Aragon personally performed the evaluation and management (E/M) services for this established patient who presents today with (a) new problem(s)/exacerbation of (an) existing condition(s).  That E/M includes conducting the clinically appropriate interval history &/or exam, assessing all new/exacerbated conditions, and establishing a new plan of care.  Today, my LEROY, [employer], was here to observe &/or participated in my evaluation and management service for this new problem/exacerbated condition and follow the plan of care established by me going forward. EKG used in decision making of patient.   Differential diagnosis and plan of care discussed with patient after the evaluation.  Counseling on diet, exercise, and medication compliance was done. Counseling on smoking and alcohol cessation was offered when appropriate. Pain assessed and judicious use of narcotics when appropriate was discussed. Importance of fall prevention discussed. Advanced care planning was discussed with patient and family.       [EKG obtained to assist in diagnosis and management of assessed problem(s)] : EKG obtained to assist in diagnosis and management of assessed problem(s)

## 2024-08-22 NOTE — CARDIOLOGY SUMMARY
[de-identified] : 4/24/24: AFIB unchanged from prior  [de-identified] : TTE 4/2024: further reduction in EF to 30-35%, moderate-severe MR. Dilated IVC.   WENDI:  1. Left ventricular cavity is mildly dilated. Left ventricular wall thickness is mildly increased. Left ventricular systolic function is moderately to severely decreased with an ejection fraction visually estimated at 30 to 35 %.  2. The left atrium is moderately dilated.  3. Moderate to severe mitral regurgitation.  4. LImited study performed given patient's severe obstructive sleep apnea.  5. No evidence of left atrial or left atrial appendage thrombus. [de-identified] : Cath 9/30/3021:\par  Angiographic Findings\par  Cardiac Arteries and Lesion Findings\par  LMCA: Mild diffuse atherosclerosis\par  LAD: Diffuse mild to moderate atherosclerosis till apex\par  Prox LAD: 30% stenosis.Pre procedure SWATHI III flow was noted.\par  LCx: Diffuse mild to moderate atherosclerosis.\par  Prox CX: 30% stenosis.\par  RCA: Diffuse mild to moderate atherosclerosis.\par  Mid RCA: 40% stenosis 24 mm length.\par  Impression\par  Diagnostic Conclusions\par  Non-obstructive coronary artery disease. LVEDP of 16.\par  Recommendations\par  Recommend aggressive medical management of CAD as per ACC/AHA guidelines\par  with risk factor modification including weight loss. Patient to follow up\par  closely in the office.\par  Estimated Blood Loss:4ml.\par  Complications:\par  No complication.\par

## 2024-09-09 ENCOUNTER — APPOINTMENT (OUTPATIENT)
Dept: INTERNAL MEDICINE | Facility: CLINIC | Age: 64
End: 2024-09-09
Payer: COMMERCIAL

## 2024-09-09 VITALS
OXYGEN SATURATION: 94 % | SYSTOLIC BLOOD PRESSURE: 130 MMHG | HEART RATE: 90 BPM | BODY MASS INDEX: 41.75 KG/M2 | HEIGHT: 73 IN | WEIGHT: 315 LBS | DIASTOLIC BLOOD PRESSURE: 80 MMHG | TEMPERATURE: 97.5 F

## 2024-09-09 DIAGNOSIS — G47.33 OBSTRUCTIVE SLEEP APNEA (ADULT) (PEDIATRIC): ICD-10-CM

## 2024-09-09 PROCEDURE — 99214 OFFICE O/P EST MOD 30 MIN: CPT

## 2024-09-09 NOTE — RESULTS/DATA
[TextEntry] : - Current CPAP usage: 26 out of 30 days between July 13th to August 11th, with an average usage of 4 hours and 22 minutes per day - Current AHI: 6.5 with CPAP therapy - CPAP leak rate: 58.8 liters per minute

## 2024-09-09 NOTE — HISTORY OF PRESENT ILLNESS
[FreeTextEntry1] : Mr. Terry presented for follow-up regarding his CPAP therapy for sleep apnea. He reported struggling with the CPAP machine, despite initial success. He mentioned that the machine recorded usage for only two hours when he wore it for eight hours. Approximately three to four weeks ago, he underwent bariatric gastric sleeve surgery. During his hospitalization, his CPAP machine malfunctioned, and he was provided with a different CPAP machine and a full face mask, which he found more comfortable compared to his nasal mask. He is currently awaiting a new mask prescription from our office.  Post-surgery, Mr. Terry experienced a period without his CPAP machine due to the malfunction and awaiting the new machine. He used the new machine last night with a recorded usage of seven hours and reported it felt more comfortable, although he experienced issues with noise and fit, possibly due to leaks.  Mr. Terry is highly motivated to improve his health, having already lost 70 pounds post-surgery and expecting further weight loss. His AHI (Apnea-Hypopnea Index) has improved significantly from 105 during his initial sleep study to 6.5 with CPAP therapy. He expressed concern about the impact of sleep apnea on his atrial fibrillation and overall heart health, as advised by his cardiologist.

## 2024-09-09 NOTE — PLAN
[TextEntry] : 1. Obstructive Sleep Apnea (OMAYRA): The patient has a history of severe obstructive sleep apnea with an initial AHI of 105, now improved to 6.5 with CPAP therapy. The patient has struggled with CPAP compliance due to mask fit issues and machine malfunction. - Pathophysiology of OMAYRA was discussed with the patient. - Encouraged continued use of CPAP therapy and compliance to improve outcomes. - Awaiting new full face mask prescription to improve fit and comfort. - Advised to continue weight loss efforts as it may further improve OMAYRA severity.  2. Excessive Daytime Sleepiness: Improved since the last visit.  - The patient was advised not to drive or operate heavy machinery if sleepy.  3. Morbid obesity: s/p sleeve resection. - encouraged to continue loosing weight.   F/u on 3 months or sooner as needed.

## 2024-09-09 NOTE — REVIEW OF SYSTEMS
[TextEntry] : - Positive for atrial fibrillation - Negative for other pertinent pulmonary or sleep-related symptoms

## 2024-09-09 NOTE — PHYSICAL EXAM
Electronically submitted PA for Xifaxan 550 mg BID to [facility]   Patient ID # [id number]. Phone # [phone].     CANDACE Chaparro  Nurse Practitioner, Hepatology  [phone] (office) [No Acute Distress] : no acute distress [Normal Oropharynx] : normal oropharynx [IV] : Mallampati Class: IV [Normal Appearance] : normal appearance [No Neck Mass] : no neck mass [Normal Rate/Rhythm] : normal rate/rhythm [Normal S1, S2] : normal s1, s2 [No Murmurs] : no murmurs [No Resp Distress] : no resp distress [Clear to Auscultation Bilaterally] : clear to auscultation bilaterally [No Abnormalities] : no abnormalities [Benign] : benign [No Clubbing] : no clubbing [No Cyanosis] : no cyanosis [No Edema] : no edema [Oriented x3] : oriented x3 [Normal Affect] : normal affect [TextBox_2] : morbidly obese

## 2024-09-18 ENCOUNTER — RX RENEWAL (OUTPATIENT)
Age: 64
End: 2024-09-18

## 2024-09-23 ENCOUNTER — NON-APPOINTMENT (OUTPATIENT)
Age: 64
End: 2024-09-23

## 2024-09-23 ENCOUNTER — APPOINTMENT (OUTPATIENT)
Dept: ELECTROPHYSIOLOGY | Facility: CLINIC | Age: 64
End: 2024-09-23
Payer: COMMERCIAL

## 2024-09-23 VITALS
SYSTOLIC BLOOD PRESSURE: 144 MMHG | WEIGHT: 315 LBS | HEART RATE: 99 BPM | HEIGHT: 73 IN | OXYGEN SATURATION: 99 % | DIASTOLIC BLOOD PRESSURE: 66 MMHG | BODY MASS INDEX: 41.75 KG/M2

## 2024-09-23 PROCEDURE — G2211 COMPLEX E/M VISIT ADD ON: CPT | Mod: NC

## 2024-09-23 PROCEDURE — 93000 ELECTROCARDIOGRAM COMPLETE: CPT

## 2024-09-23 PROCEDURE — 99215 OFFICE O/P EST HI 40 MIN: CPT

## 2024-09-23 NOTE — ASSESSMENT
[Procedure Intermediate Risk] : the procedure risk is intermediate [Patient Intermediate Risk] : the patient is an intermediate risk [As per surgery] : as per surgery [FreeTextEntry1] : This is a 63-year-old man with nonischemic dilated cardiomyopathy EF 30%. ECG demonstrates Atrial fib with rate control.    AF   Plan has been for AF ablation after Bariatric surgery.  He is doing well with an additional 10 lbs weight loss last 2 weeks. Now 342 lbs.  Will arrange for EP Study and catheter ablation possibly PFA.  Rate somewhat fast will increase metoprolol to 75 mg daily.

## 2024-09-23 NOTE — CARDIOLOGY SUMMARY
[de-identified] : 9/8/21 : Atrial fibrillation  74 bpm \par  \par  6/7/18 : Atrial fibrillation 103 bpm.  [de-identified] : 4/8/24 : TTE Mild concentric left ventricular hypertrophy is present.  Moderate to severely reduced left ventricular systolic function.  Estimated left ventricular ejection fraction is 30-35 % with global  hypokinesis.  Unable to assess diastolic function due to atrial fibrillation. The right ventricle is normal in size, wall thickness, wall motion, and  contractility based on TAPSE.  The left atrium is moderately dilated.  The right atrium appears mildly dilated.  Moderate to severe mitral regurgitation is present.  Moderate to severe tricuspid valve regurgitation is present.  Mild pulmonary hypertension.  5/18/21 :  WENDI The mitral valve leaflets appear thin and normal.  Moderate (2+) mitral regurgitation is present.  A saline contrast study was performed and showed no evidence of a patent  foramen ovale.  Thrombus seen in the left atrial appendage.  Moderately reduced left ventricular systolic function.  Estimated left ventricular ejection fraction is 35 %.  5/16/21 : TTE  Endocardium is not well visualized; grossly the left ventricle appears  enlarged with mild systolic dysfunction. Visual  stimation of left  ventricle ejection fraction is 45%.  The IVC is dilated with decreased respiratory variation, suggesting  elevated right atrial pressure.  Mild to moderate mitral regurgitation.  Mild tricuspid valve regurgitation.  Mild pulmonary hypertension.  [de-identified] : 4/29/24 :  Diagnostic Conclusions:  1. Moderate mid RCA atherosclerosis (FFR Angio negative) along with moderate OM1 disease. 2. Elevated filling pressure (PCWP 35) with adequate perfusion.

## 2024-09-23 NOTE — HISTORY OF PRESENT ILLNESS
[Preoperative Visit] : for a medical evaluation prior to surgery [Stable] : Stable [Fatigue] : fatigue [Dyspnea] : dyspnea [Poor Exercise Tolerance] : poor exercise tolerance [Diabetes] : diabetes [Cardiovascular Disease] : cardiovascular disease [Pulmonary Disease] : pulmonary disease [Sleep Apnea] : sleep apnea [Prior Anesthesia] : Prior anesthesia [Fever] : no fever [Chills] : no chills [Chest Pain] : no chest pain [Cough] : no cough [Dysuria] : no dysuria [Urinary Frequency] : no urinary frequency [Nausea] : no nausea [Vomiting] : no vomiting [Diarrhea] : no diarrhea [Abdominal Pain] : no abdominal pain [Easy Bruising] : no easy bruising [Lower Extremity Swelling] : no lower extremity swelling [Anti-Platelet Agents] : no anti-platelet agents [Nicotine Dependence] : no nicotine dependence [Alcohol Use] : no  alcohol use [Renal Disease] : no renal disease [Frequent use of NSAIDs] : no use of NSAIDs [Steroid Use in Last 6 Months] : no steroid use in the last six months [Frequent Aspirin Use] : no frequent aspirin use [Prev Anesthesia Reaction] : no previous anesthesia reaction [Anesthesia Reaction] : no anesthesia reaction [Sudden Death] : no sudden death [Clotting Disorder] : no clotting disorder [Bleeding Disorder] : no bleeding disorder [FreeTextEntry1] : This is a 63 year old man with Obesity, Dilated Cardiomyopathy, Systolic CHF, paroxysmal Atrial fib s/p prior WENDI Cardioversion (2017), possible OMAYRA, COVID in December 2020 who presents after cardioversion and initiation of Tikosyn.  He reports fatigue and exercise intolerance. VIMAL WRAY  denies chest pain, chest pressure, shortness of breath, lightheadedness, dizziness, palpitations, syncope, presyncope, orthopnea, PND, or edema.

## 2024-10-01 ENCOUNTER — APPOINTMENT (OUTPATIENT)
Dept: ELECTROPHYSIOLOGY | Facility: CLINIC | Age: 64
End: 2024-10-01

## 2024-10-01 DIAGNOSIS — I48.91 UNSPECIFIED ATRIAL FIBRILLATION: ICD-10-CM

## 2024-10-01 DIAGNOSIS — E11.9 TYPE 2 DIABETES MELLITUS W/OUT COMPLICATIONS: ICD-10-CM

## 2024-10-01 DIAGNOSIS — I50.20 UNSPECIFIED SYSTOLIC (CONGESTIVE) HEART FAILURE: ICD-10-CM

## 2024-10-01 DIAGNOSIS — I10 ESSENTIAL (PRIMARY) HYPERTENSION: ICD-10-CM

## 2024-10-01 NOTE — HISTORY OF PRESENT ILLNESS
[FreeTextEntry1] : 64-year-old male with obesity nonobstructive CAD CHF LVEF 30 to 35% OMAYRA persistent atrial fibrillation who underwent Tikosyn load with breakthrough of A-fib.  Patient has been compliant with Eliquis 5 mg twice daily, metoprolol 75mg bid.Pt is s/p bariatric surgery.   9.23.24 ECG atrial fibrillation  bpm narrow QRS Cardiac workup: TTE from 6/2024 shows mildly dilated LV with LVEF 30-35%, LA moderately dilated moderate to severe MR [facility] 4.2024 nonobstructive CAD moderate mid RCA disease moderate OM1 disease.

## 2024-10-18 ENCOUNTER — RX RENEWAL (OUTPATIENT)
Age: 64
End: 2024-10-18

## 2024-10-28 ENCOUNTER — APPOINTMENT (OUTPATIENT)
Dept: ELECTROPHYSIOLOGY | Facility: CLINIC | Age: 64
End: 2024-10-28
Payer: COMMERCIAL

## 2024-10-28 VITALS
HEIGHT: 73 IN | HEART RATE: 97 BPM | OXYGEN SATURATION: 100 % | WEIGHT: 315 LBS | DIASTOLIC BLOOD PRESSURE: 63 MMHG | BODY MASS INDEX: 41.75 KG/M2 | SYSTOLIC BLOOD PRESSURE: 121 MMHG

## 2024-10-28 DIAGNOSIS — E11.9 TYPE 2 DIABETES MELLITUS W/OUT COMPLICATIONS: ICD-10-CM

## 2024-10-28 DIAGNOSIS — I48.91 UNSPECIFIED ATRIAL FIBRILLATION: ICD-10-CM

## 2024-10-28 DIAGNOSIS — I10 ESSENTIAL (PRIMARY) HYPERTENSION: ICD-10-CM

## 2024-10-28 PROCEDURE — 99214 OFFICE O/P EST MOD 30 MIN: CPT | Mod: 25

## 2024-10-28 PROCEDURE — 93000 ELECTROCARDIOGRAM COMPLETE: CPT

## 2024-10-28 RX ORDER — AMIODARONE HYDROCHLORIDE 200 MG/1
200 TABLET ORAL DAILY
Qty: 180 | Refills: 0 | Status: ACTIVE | COMMUNITY
Start: 2024-10-28 | End: 1900-01-01

## 2024-10-31 ENCOUNTER — TRANSCRIPTION ENCOUNTER (OUTPATIENT)
Age: 64
End: 2024-10-31

## 2024-10-31 DIAGNOSIS — R97.20 ELEVATED PROSTATE, SPECIFIC ANTIGEN [PSA]: ICD-10-CM

## 2024-10-31 LAB
PSA FREE FLD-MCNC: 11 %
PSA FREE SERPL-MCNC: 0.64 NG/ML
PSA SERPL-MCNC: 5.88 NG/ML

## 2024-11-01 ENCOUNTER — TRANSCRIPTION ENCOUNTER (OUTPATIENT)
Age: 64
End: 2024-11-01

## 2024-11-11 RX ORDER — BUMETANIDE 2 MG/1
2 TABLET ORAL DAILY
Qty: 15 | Refills: 0 | Status: ACTIVE | COMMUNITY
Start: 2024-11-11 | End: 1900-01-01

## 2024-11-20 ENCOUNTER — RX RENEWAL (OUTPATIENT)
Age: 64
End: 2024-11-20

## 2024-11-20 ENCOUNTER — APPOINTMENT (OUTPATIENT)
Dept: CARDIOLOGY | Facility: CLINIC | Age: 64
End: 2024-11-20

## 2024-11-20 VITALS
SYSTOLIC BLOOD PRESSURE: 135 MMHG | BODY MASS INDEX: 41.75 KG/M2 | HEIGHT: 73 IN | WEIGHT: 315 LBS | HEART RATE: 91 BPM | OXYGEN SATURATION: 96 % | DIASTOLIC BLOOD PRESSURE: 80 MMHG

## 2024-11-20 PROCEDURE — 99215 OFFICE O/P EST HI 40 MIN: CPT | Mod: 25

## 2024-11-21 ENCOUNTER — APPOINTMENT (OUTPATIENT)
Dept: MRI IMAGING | Facility: CLINIC | Age: 64
End: 2024-11-21

## 2024-11-21 ENCOUNTER — OUTPATIENT (OUTPATIENT)
Dept: OUTPATIENT SERVICES | Facility: HOSPITAL | Age: 64
LOS: 1 days | End: 2024-11-21
Payer: COMMERCIAL

## 2024-11-21 ENCOUNTER — RESULT REVIEW (OUTPATIENT)
Age: 64
End: 2024-11-21

## 2024-11-21 DIAGNOSIS — R97.20 ELEVATED PROSTATE SPECIFIC ANTIGEN [PSA]: ICD-10-CM

## 2024-11-21 PROCEDURE — 72197 MRI PELVIS W/O & W/DYE: CPT

## 2024-11-21 PROCEDURE — 72197 MRI PELVIS W/O & W/DYE: CPT | Mod: 26,76

## 2024-11-21 PROCEDURE — A9585: CPT

## 2024-11-21 PROCEDURE — 76498P: CUSTOM | Mod: 26

## 2024-11-21 PROCEDURE — 76498 UNLISTED MR PROCEDURE: CPT

## 2024-11-22 NOTE — DISCHARGE NOTE PROVIDER - NSCORESITESY/N_GEN_A_CORE_RD
[facility] EMERGENCY DEPARTMENT ENCOUNTER    Basic Information  Name: Amauri Carrero Age: 58 year old Sex: male   MRN: [id number] Encounter: 11/21/2024, 8:10 PM  PCP: Pcp, No    Chief Complaint  Chief Complaint   Patient presents with    Calf Pain     History of Present Illness    Amauri Carrero is a 58 year old male, otherwise healthy, with past medical history of DM, and DVT, presenting to the ED for evaluation of right calf pain. Pt states that he jumped off the 2nd step of a ladder while at work on Monday. He developed mild discomfort, and woke up the next day with right calf tightness and swelling which has been persistent since. He went to work the following days, and came to the ED due to his persistent symptoms. Pt denies any chest pain/tightness, shortness of breathe, abdominal pain, headaches, slurred speech, vision changes, or loss of consciousness. States that he was prescribed Eliquis approximately 2 years ago due to a DVT, and took it for a month and stopped taking it due to the cost.     I obtained the history from: Patient himself.    Health Status  Allergies:  ALLERGIES:  No Known Allergies    Current Medications:  No current facility-administered medications on file prior to encounter.     Current Outpatient Medications on File Prior to Encounter   Medication Sig Dispense Refill    CINNAMON PO Take 1 capsule by mouth daily.      Potassium (POTASSIMIN PO) Take 1 tablet by mouth every evening.      MAGNESIUM PO Take 1 tablet by mouth every evening.      Omega-3 Fatty Acids (FISH OIL PO) Take 1 capsule by mouth every evening.      Ascorbic Acid (VITAMIN C PO) Take 1 tablet by mouth every morning.       VITAMIN D PO Take 1 capsule by mouth every morning.        Past Medical History    Past Medical History:   Diagnosis Date    Diabetes (CMS/HCC)      Past Surgical History:   Procedure Laterality Date    Incision and drainage of wound      right upper back     No family history  on file.    Social History     Tobacco Use    Smoking status: Never    Smokeless tobacco: Never   Vaping Use    Vaping status: never used   Substance Use Topics    Alcohol use: Not Currently    Drug use: Yes     Types: Prescription Drugs     Review of Systems  General: Negative except HPI  Integumentary problem(s): Negative except HPI  Eye problem(s): Negative except HPI  ENT problem(s): Negative except HPI  Cardiovascular problem(s): Negative except HPI  Respiratory problem(s): Negative except HPI  Gastrointestinal problem(s): Negative except HPI  Musculoskeletal problem(s): Negative except HPI  Neurological problem(s): Negative except HPI    All systems otherwise negative, ROS reviewed as documented in chart.    Physical Exam  ED Triage Vitals [11/21/24 1832]   BP (!) 172/89   Heart Rate (!) 110   Resp 18   Temp 97.9 °F (36.6 °C)   SpO2 95 %      Vitals:    11/21/24 1832 11/21/24 2100 11/21/24 2314 11/22/24 0020   BP: (!) 172/89 (!) 162/91 (!) 172/104 (!) 174/99   Pulse: (!) 110 (!) 104 (!) 104 (!) 100   Resp: 18 17 16    Temp: 97.9 °F (36.6 °C) 99.4 °F (37.4 °C) 99 °F (37.2 °C) 98.2 °F (36.8 °C)   TempSrc: Temporal  Temporal Oral   SpO2: 95% 95% 94% 91%   Weight:   104.2 kg (229 lb 11.5 oz)      General:  No acute distress. Alert.  Skin:  Warm. Dry. Intact.  No erythema appreciated  Head:  Normocephalic. Atraumatic.  Eye:  PERRL. EOMI. Normal conjunctiva. Non-icteric.  ENMT:  Oral mucosa moist. No pharyngeal erythema or exudate.  Neck: supple, no adenopathy, no meningismus.  Cardiovascular:  Regular rhythm, tachycardic. Normal peripheral perfusion. No edema.  Respiratory:  Lungs CTA. Respirations are non-labored. BS equal.  Gastrointestinal:  Soft. Nontender. Non distended. Normal BS.  Back: Nontender. Normal alignment.  Musculoskeletal:  Normal ROM. No deformities. Pitting edema present in the left lower extremity compared to the right. No erythema, or ecchymosis present.   Neurological:  A/O x 4. No focal  neurological deficit observed. CN II-XII intact. Normal sensory. Normal motor.  Psychiatric: Cooperative. Appropriate mood and affect.    Medical Decision Making  Rationale:  Multiple differential diagnoses were considered. The patient / caregivers were apprised of diagnostic / treatment options including alternate modes of care, in addition to the risks and benefits, for this medical condition. Based on this discussion the patient / caregiver agrees with this chosen diagnostic and treatment plan.    Orders:  Medications   heparin (porcine) injection 8,300 Units (has no administration in time range)   heparin (porcine) 25,000 units/250 mL in dextrose 5 % infusion (has no administration in time range)   heparin (porcine) injection 8,300 Units (has no administration in time range)   heparin (porcine) injection 4,100 Units (has no administration in time range)   insulin glargine (LANTUS) injection 10 Units (has no administration in time range)   dextrose 50 % injection 25 g (has no administration in time range)   dextrose 50 % injection 12.5 g (has no administration in time range)   glucagon (GLUCAGEN) injection 1 mg (has no administration in time range)   dextrose (GLUTOSE) 40 % gel 15 g (has no administration in time range)   dextrose (GLUTOSE) 40 % gel 30 g (has no administration in time range)   iohexol (OMNIPAQUE 350 INJECT) contrast solution 100 mL (100 mLs Intravenous Given 11/21/24 2240)       EKG:   Time: 2102  Rate: 103  Rhythm: sinus tachycardia   EKG Interpretation  Sinus tachycardia, no ST segment elevation appreciated  EKG interpreted independently by ED physician pending official cardiology report    Laboratory Results:  Results for orders placed or performed during the hospital encounter of 11/21/24   Comprehensive Metabolic Panel    Specimen: Blood, Venous   Result Value Ref Range    Fasting Status      Sodium 137 135 - 145 mmol/L    Potassium 4.4 3.4 - 5.1 mmol/L    Chloride 106 97 - 110 mmol/L     Carbon Dioxide 24 21 - 32 mmol/L    Anion Gap 11 7 - 19 mmol/L    Glucose 336 (H) 70 - 99 mg/dL    BUN 26 (H) 6 - 20 mg/dL    Creatinine 1.10 0.67 - 1.17 mg/dL    Glomerular Filtration Rate 77 >=60    BUN/Cr 24 7 - 25    Calcium 8.8 8.4 - 10.2 mg/dL    Bilirubin, Total 0.5 0.2 - 1.0 mg/dL    GOT/AST 9 <=37 Units/L    GPT/ALT 16 <64 Units/L    Alkaline Phosphatase 90 45 - 117 Units/L    Albumin 3.2 (L) 3.4 - 5.0 g/dL    Protein, Total 7.1 6.4 - 8.2 g/dL    Globulin 3.9 2.0 - 4.0 g/dL    A/G Ratio 0.8 (L) 1.0 - 2.4   Prothrombin Time (INR/PT)    Specimen: Blood, Venous   Result Value Ref Range    Protime- PT 10.9 9.7 - 11.8 sec    INR 1.0     Partial Thromboplastin Time (PTT)    Specimen: Blood, Venous   Result Value Ref Range    PTT 27 22 - 32 sec   CBC with Automated Differential (performable only)    Specimen: Blood, Venous   Result Value Ref Range    WBC 8.0 4.2 - 11.0 K/mcL    RBC 4.15 (L) 4.50 - 5.90 mil/mcL    HGB 11.0 (L) 13.0 - 17.0 g/dL    HCT 34.5 (L) 39.0 - 51.0 %    MCV 83.1 78.0 - 100.0 fl    MCH 26.5 26.0 - 34.0 pg    MCHC 31.9 (L) 32.0 - 36.5 g/dL    RDW-CV 13.3 11.0 - 15.0 %    RDW-SD 40.0 39.0 - 50.0 fL     140 - 450 K/mcL    NRBC 0 <=0 /100 WBC    Neutrophil, Percent 63 %    Lymphocytes, Percent 23 %    Mono, Percent 8 %    Eosinophils, Percent 4 %    Basophils, Percent 1 %    Immature Granulocytes 1 %    Absolute Neutrophils 5.1 1.8 - 7.7 K/mcL    Absolute Lymphocytes 1.9 1.0 - 4.0 K/mcL    Absolute Monocytes 0.7 0.3 - 0.9 K/mcL    Absolute Eosinophils  0.3 0.0 - 0.5 K/mcL    Absolute Basophils 0.0 0.0 - 0.3 K/mcL    Absolute Immature Granulocytes 0.1 0.0 - 0.2 K/mcL   CBC No Differential    Specimen: Blood, Venous   Result Value Ref Range    WBC 8.5 4.2 - 11.0 K/mcL    RBC 4.01 (L) 4.50 - 5.90 mil/mcL    HGB 10.7 (L) 13.0 - 17.0 g/dL    HCT 33.1 (L) 39.0 - 51.0 %    MCV 82.5 78.0 - 100.0 fl    MCH 26.7 26.0 - 34.0 pg    MCHC 32.3 32.0 - 36.5 g/dL     140 - 450 K/mcL    RDW-CV 13.2  11.0 - 15.0 %    RDW-SD 39.8 39.0 - 50.0 fL    NRBC 0 <=0 /100 WBC   GLUCOSE, BEDSIDE - POINT OF CARE    Specimen: Blood   Result Value Ref Range    GLUCOSE, BEDSIDE - POINT OF CARE 270 (H) 70 - 99 mg/dL       Radiology Results:  CTA CHEST PULMONARY EMBOLISM   Final Result      1.   Bilateral main pulmonary artery emboli with a nonocclusive saddle   component as demonstrated on image 105 of series 3 extending from the left   main pulmonary artery to the proximal right main pulmonary artery. RV LV   ratio is not dilated to suggest víctor heart strain.   2.   1.0 cm nodule within the posterior segment of the left lower lung.      Emergent findings were discussed with Dr. Mccoy at the time of this   dictation.      Electronically Signed by: CARMEN CLIFTON MD    Signed on: 11/21/2024 11:06 PM    Workstation ID: [id number]      [facility] LOWER EXTREMITY VENOUS DUPLEX RIGHT   Final Result   1.   Acute deep vein thrombosis of the right lower extremity, from the mid   femoral vein through the calf veins, described above..         Findings of the acute deep venous thrombosis were conveyed via telephone by   Dr. Brar (Radiologist) and acknowledged by Dr. Po Israel at   2013 on 11/21/2024.         Electronically Signed by: DOMENICA BRAR M.D.    Signed on: 11/21/2024 8:15 PM    Workstation ID: [id number]      XR TIBIA FIBULA 2 VIEWS RIGHT   Final Result   No acute osseous abnormality of the right tibia-fibula.      Electronically Signed by: DOMENICA BRAR M.D.    Signed on: 11/21/2024 7:33 PM    Workstation ID: [id number]          I independently reviewed XR imaging and interpreted results    MDM:     Labs and imaging ordered for further evaluation including a DVT study of the right lower extremity.    ED Course  Vitals:    11/21/24 1832 11/21/24 2100 11/21/24 2314 11/22/24 0020   BP: (!) 172/89 (!) 162/91 (!) 172/104 (!) 174/99   Pulse: (!) 110 (!) 104 (!) 104 (!) 100   Resp: 18 17 16    Temp: 97.9 °F (36.6  °C) 99.4 °F (37.4 °C) 99 °F (37.2 °C) 98.2 °F (36.8 °C)   TempSrc: Temporal  Temporal Oral   SpO2: 95% 95% 94% 91%   Weight:   104.2 kg (229 lb 11.5 oz)        I considered discharge vs. Observation or admission vs escalation of care.    ED Course as of 11/22/24 0025   u Nov 21, 2024   2150 Patient was tachycardic, at this time CT PE study has been ordered and currently pending but does have a large DVT in his right lower extremity, patient was currently supposed to be taking Eliquis, however was not able to afford it so he took himself off of it previously, at this time have already discussed with medicine service but would bring patient in for initiation of anticoagulation, I did discuss this with the patient, I would place in observation [MO]      ED Course User Index  [MO] Po Israel MD     Impression and Plan  Diagnosis:  1. Acute deep vein thrombosis (DVT) of femoral vein of right lower extremity  (CMD)    2. Acute saddle pulmonary embolism without acute cor pulmonale  (CMD)      Condition:  STABLE    Disposition:  The patient will be admitted to a Medical Bed by the Hospitalist Service.    Placed in OBSERVATION STATUS WITH TELEMETRY  under Dr. Bobby .    Counseled:  Patient, Regarding diagnosis, Regarding diagnostic results, Regarding treatment, and Patient understood      Charting performed by ED Scribe Rubin Hawkins for Dr. Po Israel MD.    The documentation recorded by the scribe accurately reflects the service I personally performed and the decisions made by me, Po Israel MD.     Critical Care Time:   Total critical care time I spent for this patient was 39 minutes.  This time was exclusive of separately billable procedures.  This total time includes my full attention to the patients care. This includes time at the bedside with the patient, discussion with medical staff and consultants involved in patients care, reviewing all applicable labs, imaging and other  tests as noted. Reviewing old records where applicable and time spent documenting the encounter.  Multiple bedside re-evaluations were performed.  Failure to intervene on this patient may have resulted in significant deterioration in their condition.       Po Israel MD  11/22/24 0025     Yes

## 2024-11-27 ENCOUNTER — OUTPATIENT (OUTPATIENT)
Dept: OUTPATIENT SERVICES | Facility: HOSPITAL | Age: 64
LOS: 1 days | End: 2024-11-27
Payer: COMMERCIAL

## 2024-11-27 VITALS
OXYGEN SATURATION: 95 % | HEIGHT: 73 IN | TEMPERATURE: 98 F | WEIGHT: 315 LBS | HEART RATE: 100 BPM | RESPIRATION RATE: 16 BRPM | SYSTOLIC BLOOD PRESSURE: 110 MMHG | DIASTOLIC BLOOD PRESSURE: 80 MMHG

## 2024-11-27 DIAGNOSIS — Z90.89 ACQUIRED ABSENCE OF OTHER ORGANS: Chronic | ICD-10-CM

## 2024-11-27 DIAGNOSIS — Z01.818 ENCOUNTER FOR OTHER PREPROCEDURAL EXAMINATION: ICD-10-CM

## 2024-11-27 LAB
ALBUMIN SERPL ELPH-MCNC: 3.4 G/DL — SIGNIFICANT CHANGE UP (ref 3.3–5.2)
ALP SERPL-CCNC: 84 U/L — SIGNIFICANT CHANGE UP (ref 40–120)
ALT FLD-CCNC: 15 U/L — SIGNIFICANT CHANGE UP
ANION GAP SERPL CALC-SCNC: 13 MMOL/L — SIGNIFICANT CHANGE UP (ref 5–17)
APTT BLD: 35.2 SEC — SIGNIFICANT CHANGE UP (ref 24.5–35.6)
AST SERPL-CCNC: 19 U/L — SIGNIFICANT CHANGE UP
BILIRUB SERPL-MCNC: 0.6 MG/DL — SIGNIFICANT CHANGE UP (ref 0.4–2)
BLD GP AB SCN SERPL QL: SIGNIFICANT CHANGE UP
BUN SERPL-MCNC: 13.6 MG/DL — SIGNIFICANT CHANGE UP (ref 8–20)
CALCIUM SERPL-MCNC: 8.5 MG/DL — SIGNIFICANT CHANGE UP (ref 8.4–10.5)
CHLORIDE SERPL-SCNC: 104 MMOL/L — SIGNIFICANT CHANGE UP (ref 96–108)
CO2 SERPL-SCNC: 23 MMOL/L — SIGNIFICANT CHANGE UP (ref 22–29)
CREAT SERPL-MCNC: 0.65 MG/DL — SIGNIFICANT CHANGE UP (ref 0.5–1.3)
EGFR: 104 ML/MIN/1.73M2 — SIGNIFICANT CHANGE UP
GLUCOSE SERPL-MCNC: 111 MG/DL — HIGH (ref 70–99)
HCT VFR BLD CALC: 42.2 % — SIGNIFICANT CHANGE UP (ref 39–50)
HGB BLD-MCNC: 13.7 G/DL — SIGNIFICANT CHANGE UP (ref 13–17)
INR BLD: 1.48 RATIO — HIGH (ref 0.85–1.16)
MAGNESIUM SERPL-MCNC: 2 MG/DL — SIGNIFICANT CHANGE UP (ref 1.8–2.6)
MCHC RBC-ENTMCNC: 30.6 PG — SIGNIFICANT CHANGE UP (ref 27–34)
MCHC RBC-ENTMCNC: 32.5 G/DL — SIGNIFICANT CHANGE UP (ref 32–36)
MCV RBC AUTO: 94.2 FL — SIGNIFICANT CHANGE UP (ref 80–100)
PLATELET # BLD AUTO: 246 K/UL — SIGNIFICANT CHANGE UP (ref 150–400)
POTASSIUM SERPL-MCNC: 3.8 MMOL/L — SIGNIFICANT CHANGE UP (ref 3.5–5.3)
POTASSIUM SERPL-SCNC: 3.8 MMOL/L — SIGNIFICANT CHANGE UP (ref 3.5–5.3)
PROT SERPL-MCNC: 7.2 G/DL — SIGNIFICANT CHANGE UP (ref 6.6–8.7)
PROTHROM AB SERPL-ACNC: 17.1 SEC — HIGH (ref 9.9–13.4)
RBC # BLD: 4.48 M/UL — SIGNIFICANT CHANGE UP (ref 4.2–5.8)
RBC # FLD: 15.6 % — HIGH (ref 10.3–14.5)
SODIUM SERPL-SCNC: 140 MMOL/L — SIGNIFICANT CHANGE UP (ref 135–145)
WBC # BLD: 7.87 K/UL — SIGNIFICANT CHANGE UP (ref 3.8–10.5)
WBC # FLD AUTO: 7.87 K/UL — SIGNIFICANT CHANGE UP (ref 3.8–10.5)

## 2024-11-27 PROCEDURE — 80053 COMPREHEN METABOLIC PANEL: CPT

## 2024-11-27 PROCEDURE — 86900 BLOOD TYPING SEROLOGIC ABO: CPT

## 2024-11-27 PROCEDURE — 93005 ELECTROCARDIOGRAM TRACING: CPT

## 2024-11-27 PROCEDURE — 93010 ELECTROCARDIOGRAM REPORT: CPT

## 2024-11-27 PROCEDURE — 83735 ASSAY OF MAGNESIUM: CPT

## 2024-11-27 PROCEDURE — 85730 THROMBOPLASTIN TIME PARTIAL: CPT

## 2024-11-27 PROCEDURE — G0463: CPT

## 2024-11-27 PROCEDURE — 36415 COLL VENOUS BLD VENIPUNCTURE: CPT

## 2024-11-27 PROCEDURE — 86901 BLOOD TYPING SEROLOGIC RH(D): CPT

## 2024-11-27 PROCEDURE — 85027 COMPLETE CBC AUTOMATED: CPT

## 2024-11-27 PROCEDURE — 85610 PROTHROMBIN TIME: CPT

## 2024-11-27 PROCEDURE — 86850 RBC ANTIBODY SCREEN: CPT

## 2024-11-27 NOTE — H&P PST ADULT - ASSESSMENT
Plan/Recommendations:   -plan for **** on ***  -patient seen and examined in PST on ***  -ECG and Labs reviewed  -NPO after midnight prior with exception of sip of water with morning medications  -Continue/Hold ***  -Pre-procedure instructions provided (verbal & written)   -Supplies and verbal/written Instructions for pre-surgical chlorhexadine shower provided*****  -Consent to be obtained by attending electrophysiologist on the scheduled procedure date           64-year-old male with obesity nonobstructive CAD CHF LVEF 30 to 35% OMAYRA on CPAP persistent atrial fibrillation who underwent Tikosyn load with breakthrough of A-fib. Patient has been compliant with Eliquis 5 mg twice daily, metoprolol 75mg bid, entresto. Pt is s/p bariatric surgery 8.2024. He is in peAF despite multiple DCCV and tikosyn. Denies CP, denies resting sob, dizziness, palpitations. He lost 100lbs.  Now presents to PST prior to Atrial fibrillation ablation.      Plan/Recommendations:   -plan for Afib ablation on 12/4  -patient seen and examined in PST on 11/27  -ECG and Labs reviewed  -NPO after midnight prior with exception of sip of water with morning medications  -Hold Jardiance 3 days prior last dose 11/30. Hold entresto night before and Eliquis day of procedure.   -Pre-procedure instructions provided (verbal & written)   -Consent to be obtained by attending electrophysiologist on the scheduled procedure date

## 2024-11-27 NOTE — H&P PST ADULT - HISTORY OF PRESENT ILLNESS
Department of Cardiology                                                                  [facility]/[place] [address] [address] [facility]                                                            Telephone: [phone]. Fax:[phone]                                                                             Pre-EP Procedure H and P    HPI: 64-year-old male with obesity nonobstructive CAD CHF LVEF 30 to 35% OMAYRA on CPAP persistent atrial fibrillation who underwent Tikosyn load with breakthrough of A-fib. Patient has been compliant with Eliquis 5 mg twice daily, metoprolol 75mg bid, entresto. Pt is s/p bariatric surgery 8.2024. He is in peAF despite multiple DCCV and tikosyn. Denies CP, denies resting sob, dizziness, palpitations. He lost ~ 90lb. Now presents to PST prior to Atrial fibrillation ablation.  ?  10.28.24 ECG shows AF VR 95 bpm shreyas PVC, narrow QRS  9.23.24 ECG atrial fibrillation  bpm narrow QRS  Cardiac workup:  TTE from 6/2024 shows mildly dilated LV with LVEF 30-35%, LA moderately dilated moderate to severe MR  [facility] 4.2024 nonobstructive CAD moderate mid RCA disease moderate OM1 disease.        ROS: as stated above, otherwise negative    PHYSICAL EXAM:  Constitutional: A & O x 3  HEENT:   Normal oral mucosa, PERRL, EOMI	  Cardiovascular: Normal S1 S2, No JVD, No murmurs  Respiratory: Lungs clear to auscultation	  Gastrointestinal:  Soft, Non-tender, + BS	  Skin: No rashes, No ecchymoses, No cyanosis  Neurologic: Non-focal  Extremities: Normal range of motion, no edema  Vascular: Peripheral pulses palpable + bilaterally                                                                                                      Department of Cardiology                                                                  [facility]/[place] [address] [address] [facility] [address]                                                            Telephone: [phone]. Fax:[phone]                                                                             Pre-EP Procedure H and P    HPI: 64-year-old male with obesity nonobstructive CAD CHF LVEF 30 to 35% OMAYRA on CPAP persistent atrial fibrillation who underwent Tikosyn load with breakthrough of A-fib. Patient has been compliant with Eliquis 5 mg twice daily, metoprolol 75mg bid, entresto. Pt is s/p bariatric surgery 8.2024. He is in peAF despite multiple DCCV and tikosyn. Denies CP, denies resting sob, dizziness, palpitations. He lost 100lbs.  Now presents to PST prior to Atrial fibrillation ablation.  ?  10.28.24 ECG shows AF VR 95 bpm shreyas PVC, narrow QRS  9.23.24 ECG atrial fibrillation  bpm narrow QRS  Cardiac workup:  TTE from 6/2024 shows mildly dilated LV with LVEF 30-35%, LA moderately dilated moderate to severe MR  [facility] 4.2024 nonobstructive CAD moderate mid RCA disease moderate OM1 disease.        ROS: as stated above, otherwise negative    PHYSICAL EXAM:  Constitutional: A & O x 3  HEENT:   Normal oral mucosa, PERRL, EOMI	  Cardiovascular: Normal S1 S2, No JVD, No murmurs  Respiratory: Lungs clear to auscultation	  Gastrointestinal:  Soft, Non-tender, + BS	  Skin: No rashes, No ecchymoses, No cyanosis  Neurologic: Non-focal  Extremities: Normal range of motion, no edema  Vascular: Peripheral pulses palpable + bilaterally                                                                                                      Department of Cardiology                                                                  [facility]/[place] [address] [address] [facility]                                                            Telephone: [phone]. Fax:[phone]                                                                             Pre-EP Procedure H and P    HPI: 64-year-old male with obesity nonobstructive CAD CHF LVEF 30 to 35% OMAYRA on CPAP persistent atrial fibrillation who underwent Tikosyn load with breakthrough of A-fib. Patient has been compliant with Eliquis 5 mg twice daily, metoprolol 75mg bid, entresto. Pt is s/p bariatric surgery 8.2024. He is in peAF despite multiple DCCV and tikosyn. Denies CP, denies resting sob, dizziness, palpitations. He lost 100lbs.  Now presents to PST prior to Atrial fibrillation ablation.  ?  10.28.24 ECG shows AF VR 95 bpm shreyas PVC, narrow QRS  9.23.24 ECG atrial fibrillation  bpm narrow QRS  Cardiac workup:  TTE from 6/2024 shows mildly dilated LV with LVEF 30-35%, LA moderately dilated moderate to severe MR  [facility] 4.2024 nonobstructive CAD moderate mid RCA disease moderate OM1 disease.        ROS: as stated above, otherwise negative    PHYSICAL EXAM:  Constitutional: A & O x 3  HEENT:   Normal oral mucosa, PERRL, EOMI	  Cardiovascular: Normal S1 S2, No JVD, No murmurs  Respiratory: Lungs clear to auscultation	  Gastrointestinal:  Soft, Non-tender, + BS	  Skin: No rashes, No ecchymoses, No cyanosis  Neurologic: Non-focal  Extremities: Normal range of motion, +1 edema b/l  Vascular: Peripheral pulses palpable + bilaterally

## 2024-12-02 ENCOUNTER — TRANSCRIPTION ENCOUNTER (OUTPATIENT)
Age: 64
End: 2024-12-02

## 2024-12-03 ENCOUNTER — NON-APPOINTMENT (OUTPATIENT)
Age: 64
End: 2024-12-03

## 2024-12-04 ENCOUNTER — INPATIENT (INPATIENT)
Facility: HOSPITAL | Age: 64
LOS: 0 days | Discharge: HOME | DRG: 998 | End: 2024-12-05
Attending: INTERNAL MEDICINE | Admitting: INTERNAL MEDICINE
Payer: COMMERCIAL

## 2024-12-04 ENCOUNTER — OUTPATIENT (OUTPATIENT)
Dept: OUTPATIENT SERVICES | Facility: HOSPITAL | Age: 64
LOS: 1 days | End: 2024-12-04

## 2024-12-04 ENCOUNTER — OUTPATIENT (OUTPATIENT)
Dept: OUTPATIENT SERVICES | Facility: HOSPITAL | Age: 64
LOS: 1 days | End: 2024-12-04
Payer: COMMERCIAL

## 2024-12-04 ENCOUNTER — TRANSCRIPTION ENCOUNTER (OUTPATIENT)
Age: 64
End: 2024-12-04

## 2024-12-04 VITALS
HEART RATE: 80 BPM | TEMPERATURE: 98 F | RESPIRATION RATE: 13 BRPM | OXYGEN SATURATION: 96 % | DIASTOLIC BLOOD PRESSURE: 89 MMHG | HEIGHT: 73 IN | WEIGHT: 315 LBS | SYSTOLIC BLOOD PRESSURE: 128 MMHG

## 2024-12-04 DIAGNOSIS — Z90.89 ACQUIRED ABSENCE OF OTHER ORGANS: Chronic | ICD-10-CM

## 2024-12-04 DIAGNOSIS — Z00.8 ENCOUNTER FOR OTHER GENERAL EXAMINATION: ICD-10-CM

## 2024-12-04 DIAGNOSIS — I48.1 PERSISTENT ATRIAL FIBRILLATION: ICD-10-CM

## 2024-12-04 DIAGNOSIS — R97.20 ELEVATED PROSTATE SPECIFIC ANTIGEN [PSA]: ICD-10-CM

## 2024-12-04 DIAGNOSIS — I48.91 UNSPECIFIED ATRIAL FIBRILLATION: ICD-10-CM

## 2024-12-04 LAB
ABO RH CONFIRMATION: SIGNIFICANT CHANGE UP
GLUCOSE BLDC GLUCOMTR-MCNC: 171 MG/DL — HIGH (ref 70–99)

## 2024-12-04 PROCEDURE — 93657 TX L/R ATRIAL FIB ADDL: CPT

## 2024-12-04 PROCEDURE — 93622 COMP EP EVAL L VENTR PAC&REC: CPT | Mod: 26

## 2024-12-04 PROCEDURE — C8001: CPT

## 2024-12-04 PROCEDURE — 93656 COMPRE EP EVAL ABLTJ ATR FIB: CPT

## 2024-12-04 PROCEDURE — 92960 CARDIOVERSION ELECTRIC EXT: CPT | Mod: 59

## 2024-12-04 RX ORDER — ACETAMINOPHEN 500MG 500 MG/1
650 TABLET, COATED ORAL EVERY 6 HOURS
Refills: 0 | Status: DISCONTINUED | OUTPATIENT
Start: 2024-12-04 | End: 2024-12-05

## 2024-12-04 RX ORDER — BUMETANIDE 1 MG/1
2 TABLET ORAL DAILY
Refills: 0 | Status: DISCONTINUED | OUTPATIENT
Start: 2024-12-04 | End: 2024-12-05

## 2024-12-04 RX ORDER — ACETAMINOPHEN 500MG 500 MG/1
1000 TABLET, COATED ORAL ONCE
Refills: 0 | Status: COMPLETED | OUTPATIENT
Start: 2024-12-04 | End: 2024-12-04

## 2024-12-04 RX ORDER — OXYCODONE AND ACETAMINOPHEN 5; 325 MG/1; MG/1
1 TABLET ORAL EVERY 6 HOURS
Refills: 0 | Status: DISCONTINUED | OUTPATIENT
Start: 2024-12-04 | End: 2024-12-05

## 2024-12-04 RX ORDER — BENZOCAINE, MENTHOL 15; 3.6 MG/1; MG/1
1 LOZENGE ORAL
Refills: 0 | Status: DISCONTINUED | OUTPATIENT
Start: 2024-12-04 | End: 2024-12-05

## 2024-12-04 RX ORDER — SODIUM CHLORIDE 9 MG/ML
1000 INJECTION, SOLUTION INTRAMUSCULAR; INTRAVENOUS; SUBCUTANEOUS
Refills: 0 | Status: DISCONTINUED | OUTPATIENT
Start: 2024-12-04 | End: 2024-12-05

## 2024-12-04 RX ORDER — PANTOPRAZOLE SODIUM 40 MG/1
40 TABLET, DELAYED RELEASE ORAL
Refills: 0 | Status: DISCONTINUED | OUTPATIENT
Start: 2024-12-04 | End: 2024-12-05

## 2024-12-04 RX ORDER — 0.9 % SODIUM CHLORIDE 0.9 %
1000 INTRAVENOUS SOLUTION INTRAVENOUS
Refills: 0 | Status: DISCONTINUED | OUTPATIENT
Start: 2024-12-04 | End: 2024-12-05

## 2024-12-04 RX ORDER — SACUBITRIL AND VALSARTAN 24; 26 MG/1; MG/1
1 TABLET, FILM COATED ORAL
Refills: 0 | Status: DISCONTINUED | OUTPATIENT
Start: 2024-12-04 | End: 2024-12-05

## 2024-12-04 RX ORDER — METOPROLOL TARTRATE 100 MG/1
50 TABLET, FILM COATED ORAL DAILY
Refills: 0 | Status: DISCONTINUED | OUTPATIENT
Start: 2024-12-04 | End: 2024-12-05

## 2024-12-04 RX ORDER — AMIODARONE HYDROCHLORIDE 200 MG/1
200 TABLET ORAL DAILY
Refills: 0 | Status: DISCONTINUED | OUTPATIENT
Start: 2024-12-04 | End: 2024-12-05

## 2024-12-04 RX ORDER — ALPRAZOLAM 0.5 MG
0.25 TABLET ORAL EVERY 6 HOURS
Refills: 0 | Status: DISCONTINUED | OUTPATIENT
Start: 2024-12-04 | End: 2024-12-05

## 2024-12-04 RX ORDER — GLUCAGON INJECTION, SOLUTION 0.5 MG/.1ML
1 INJECTION, SOLUTION SUBCUTANEOUS ONCE
Refills: 0 | Status: DISCONTINUED | OUTPATIENT
Start: 2024-12-04 | End: 2024-12-05

## 2024-12-04 RX ORDER — KETOROLAC TROMETHAMINE 30 MG/ML
15 INJECTION INTRAMUSCULAR; INTRAVENOUS EVERY 6 HOURS
Refills: 0 | Status: DISCONTINUED | OUTPATIENT
Start: 2024-12-04 | End: 2024-12-05

## 2024-12-04 RX ORDER — ONDANSETRON HYDROCHLORIDE 4 MG/1
4 TABLET, FILM COATED ORAL EVERY 6 HOURS
Refills: 0 | Status: DISCONTINUED | OUTPATIENT
Start: 2024-12-04 | End: 2024-12-05

## 2024-12-04 RX ORDER — METOPROLOL TARTRATE 100 MG/1
25 TABLET, FILM COATED ORAL AT BEDTIME
Refills: 0 | Status: DISCONTINUED | OUTPATIENT
Start: 2024-12-04 | End: 2024-12-05

## 2024-12-04 RX ORDER — APIXABAN 2.5 MG/1
5 TABLET, FILM COATED ORAL EVERY 12 HOURS
Refills: 0 | Status: DISCONTINUED | OUTPATIENT
Start: 2024-12-04 | End: 2024-12-05

## 2024-12-04 RX ADMIN — SODIUM CHLORIDE 100 MILLILITER(S): 9 INJECTION, SOLUTION INTRAMUSCULAR; INTRAVENOUS; SUBCUTANEOUS at 20:54

## 2024-12-04 RX ADMIN — METOPROLOL TARTRATE 25 MILLIGRAM(S): 100 TABLET, FILM COATED ORAL at 20:56

## 2024-12-04 RX ADMIN — Medication 40 MILLIGRAM(S): at 20:54

## 2024-12-04 RX ADMIN — APIXABAN 5 MILLIGRAM(S): 2.5 TABLET, FILM COATED ORAL at 16:46

## 2024-12-04 RX ADMIN — ACETAMINOPHEN 500MG 400 MILLIGRAM(S): 500 TABLET, COATED ORAL at 13:22

## 2024-12-04 RX ADMIN — ACETAMINOPHEN 500MG 1000 MILLIGRAM(S): 500 TABLET, COATED ORAL at 13:37

## 2024-12-04 RX ADMIN — SODIUM CHLORIDE 100 MILLILITER(S): 9 INJECTION, SOLUTION INTRAMUSCULAR; INTRAVENOUS; SUBCUTANEOUS at 17:41

## 2024-12-04 RX ADMIN — Medication 400 MILLIGRAM(S): at 16:46

## 2024-12-04 RX ADMIN — SACUBITRIL AND VALSARTAN 1 TABLET(S): 24; 26 TABLET, FILM COATED ORAL at 17:40

## 2024-12-04 RX ADMIN — SODIUM CHLORIDE 100 MILLILITER(S): 9 INJECTION, SOLUTION INTRAMUSCULAR; INTRAVENOUS; SUBCUTANEOUS at 13:22
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
## 2024-12-04 NOTE — DISCHARGE NOTE PROVIDER - NSDCFUADDINST_GEN_ALL_CORE_FT
Follow up with Dr. Schultz as scheduled on 1/6/25. Please call the office for any questions or concerns.     As discussed, continue Eliquis uninterrupted for at least 6 weeks. Reschedule your urology appointment for late January.  Follow up with Dr. Schultz as scheduled on 1/6/25. Please call the office for any questions or concerns.     As discussed, continue Eliquis uninterrupted for at least 6 weeks. Reschedule your urology appointment for late January.   Continue all home medications as usual.  Start Protonix 40mg once daily for 1 week.   Follow up with Dr. Schultz in 2-4 weeks.

## 2024-12-04 NOTE — DISCHARGE NOTE PROVIDER - CARE PROVIDER_API CALL
Sunshine Schultz  Cardiac Electrophysiology  [address] [place] [address]  Phone: [phone]  Fax: [phone]  Follow Up Time:

## 2024-12-04 NOTE — DISCHARGE NOTE PROVIDER - NSDCMRMEDTOKEN_GEN_ALL_CORE_FT
amiodarone 200 mg oral tablet: 1 tab(s) orally once a day  apixaban 5 mg oral tablet: 1 tab(s) orally 2 times a day  atorvastatin 40 mg oral tablet: 1 tab(s) orally once a day  bumetanide 2 mg oral tablet: 1 tab(s) orally once a day  Entresto 24 mg-26 mg oral tablet: 1 tab(s) orally 2 times a day  Jardiance 10 mg oral tablet: 1 tab(s) orally once a day  magnesium oxide 400 mg oral tablet: 1 tab(s) orally once a day  metoprolol succinate 25 mg oral capsule, extended release: 1 cap(s) orally once a day (at bedtime)  metoprolol succinate 50 mg oral tablet, extended release: 1 tab(s) orally once a day   amiodarone 200 mg oral tablet: 1 tab(s) orally once a day  apixaban 5 mg oral tablet: 1 tab(s) orally 2 times a day  atorvastatin 40 mg oral tablet: 1 tab(s) orally once a day  bumetanide 2 mg oral tablet: 1 tab(s) orally once a day  Entresto 24 mg-26 mg oral tablet: 1 tab(s) orally 2 times a day  Jardiance 10 mg oral tablet: 1 tab(s) orally once a day  magnesium oxide 400 mg oral tablet: 1 tab(s) orally once a day  metoprolol succinate 25 mg oral capsule, extended release: 1 cap(s) orally once a day (at bedtime)  metoprolol succinate 50 mg oral tablet, extended release: 1 tab(s) orally once a day  Protonix 40 mg oral delayed release tablet: 1 tab(s) orally once a day (before a meal)

## 2024-12-04 NOTE — DISCHARGE NOTE PROVIDER - HOSPITAL COURSE
64 year old male with HTN, DM type 2, obesity BMI 43.8 s/p gastric bypass 8/2024, OMAYRA on CPAP, CAD, NICM LVEF 30-35%, and persistent atrial fibrillation despite AAD. He presented electively and is now status post uncomplicated FARAPULSE pulsed field ablation of atrial fibrillation (PVI, PW). The patient was observed overnight without event and was discharged home the following morning with a plan for outpatient follow up.

## 2024-12-04 NOTE — PROGRESS NOTE ADULT - SUBJECTIVE AND OBJECTIVE BOX
PROCEDURE(S): FARAPULSE Pulsed Field Ablation of Atrial Fibrillation    ELECTROPHYSIOLOGIST(S): Sunshine Schultz MD         COMPLICATIONS:  none        DISPOSITION: observation      CONDITION: stable    Pt doing well s/p FARAPULSE pulsed field ablation of atrial fibrillation (PVI, PW) via b/l FV access (figure 8 suture/stop-cock). Denies complaint post procedure.     I/Os: net +    MEDICATIONS  (STANDING):  aMIOdarone    Tablet 200 milliGRAM(s) Oral daily  apixaban 5 milliGRAM(s) Oral every 12 hours  atorvastatin 40 milliGRAM(s) Oral at bedtime  buMETAnide 2 milliGRAM(s) Oral daily  magnesium oxide 400 milliGRAM(s) Oral daily  metoprolol succinate ER 50 milliGRAM(s) Oral daily  metoprolol succinate ER 25 milliGRAM(s) Oral at bedtime  pantoprazole    Tablet 40 milliGRAM(s) Oral before breakfast  sacubitril 24 mG/valsartan 26 mG 1 Tablet(s) Oral two times a day    MEDICATIONS  (PRN):  acetaminophen     Tablet .. 650 milliGRAM(s) Oral every 6 hours PRN Mild Pain (1 - 3), Moderate Pain (4 - 6)  ALPRAZolam 0.25 milliGRAM(s) Oral every 6 hours PRN anxiety/insomnia  benzocaine/menthol Lozenge 1 Lozenge Oral every 2 hours PRN Sore Throat  ketorolac   Injectable 15 milliGRAM(s) IV Push every 6 hours PRN Moderate Pain (4 - 6)  ondansetron Injectable 4 milliGRAM(s) IV Push every 6 hours PRN Nausea and/or Vomiting  oxycodone    5 mG/acetaminophen 325 mG 1 Tablet(s) Oral every 6 hours PRN Severe Pain (7 - 10)    Allergies:  No Known Allergies    T(C): 36.8 (12-04-24 @ 07:04), Max: 36.8 (12-04-24 @ 07:04)  HR: 80 (12-04-24 @ 07:04) (80 - 80)  BP: 128/89 (12-04-24 @ 07:04) (128/89 - 128/89)  RR: 13 (12-04-24 @ 07:04) (13 - 13)  SpO2: 96% (12-04-24 @ 07:04) (96% - 96%)  Post-procedure VS: /63 HR 57 O2 sat 100% RR 17     Physical exam:   awake, alert, no obvious distress   Card: S1/S2, RRR, no m/g/r  Resp: lungs CTA b/l  Abd: S/NT/ND  Groins: hemostatic sutures in place; sites C/D/I; no bleeding, hematoma, erythema, exudate or edema  Ext: no edema; distal pulses intact  Skin: warm, dry, no rash     ECG: pending     Assessment:   64 year old male with HTN, DM type 2, obesity BMI 43.8 s/p gastric bypass 8/2024, OMAYRA on CPAP, CAD, NICM LVEF 30-35%, and persistent atrial fibrillation despite AAD. He presented electively and is now status post uncomplicated FARAPULSE pulsed field ablation of atrial fibrillation (PVI, PW). Resting comfortably.     Plan:   Bedrest x 4 hours, then OOB with assistance and progress as tolerated.   Groin sutures to be removed by EP service in AM.   Radial line to be removed when pt fully awake w/stable VS > 1 hr.   Pending groin status, resume Eliquis 5mg Q 12 hrs.   DO NOT HOLD, INTERRUPT OR REVERSE ANTICOAGULATION WITHOUT EXPLICIT APPROVAL FROM EP SERVICE.   Continue amiodarone.   Start Protonix 40mg once daily for 1 week.   Continue other home medications.   Gentle hydration overnight.   Please encourage incentive spirometry and ambulation once able.  Observation and monitoring on telemetry overnight with anticipated discharge in the AM and outpt follow up in 2-4 weeks.   PROCEDURE(S): FARAPULSE Pulsed Field Ablation of Atrial Fibrillation    ELECTROPHYSIOLOGIST(S): Sunshine Schultz MD         COMPLICATIONS:  none        DISPOSITION: observation      CONDITION: stable    Pt doing well s/p FARAPULSE pulsed field ablation of atrial fibrillation (PVI, PW) via b/l FV access (figure 8 suture/stop-cock). Denies complaint post procedure.     I/Os: net +    MEDICATIONS  (STANDING):  aMIOdarone    Tablet 200 milliGRAM(s) Oral daily  apixaban 5 milliGRAM(s) Oral every 12 hours  atorvastatin 40 milliGRAM(s) Oral at bedtime  buMETAnide 2 milliGRAM(s) Oral daily  magnesium oxide 400 milliGRAM(s) Oral daily  metoprolol succinate ER 50 milliGRAM(s) Oral daily  metoprolol succinate ER 25 milliGRAM(s) Oral at bedtime  pantoprazole    Tablet 40 milliGRAM(s) Oral before breakfast  sacubitril 24 mG/valsartan 26 mG 1 Tablet(s) Oral two times a day    MEDICATIONS  (PRN):  acetaminophen     Tablet .. 650 milliGRAM(s) Oral every 6 hours PRN Mild Pain (1 - 3), Moderate Pain (4 - 6)  ALPRAZolam 0.25 milliGRAM(s) Oral every 6 hours PRN anxiety/insomnia  benzocaine/menthol Lozenge 1 Lozenge Oral every 2 hours PRN Sore Throat  ketorolac   Injectable 15 milliGRAM(s) IV Push every 6 hours PRN Moderate Pain (4 - 6)  ondansetron Injectable 4 milliGRAM(s) IV Push every 6 hours PRN Nausea and/or Vomiting  oxycodone    5 mG/acetaminophen 325 mG 1 Tablet(s) Oral every 6 hours PRN Severe Pain (7 - 10)    Allergies:  No Known Allergies    T(C): 36.8 (12-04-24 @ 07:04), Max: 36.8 (12-04-24 @ 07:04)  HR: 80 (12-04-24 @ 07:04) (80 - 80)  BP: 128/89 (12-04-24 @ 07:04) (128/89 - 128/89)  RR: 13 (12-04-24 @ 07:04) (13 - 13)  SpO2: 96% (12-04-24 @ 07:04) (96% - 96%)  Post-procedure VS: /63 HR 57 O2 sat 100% RR 17     Physical exam:   awake, alert, no obvious distress   Card: S1/S2, RRR, no m/g/r  Resp: lungs CTA b/l  Abd: S/NT/ND  Groins: hemostatic sutures in place; sites C/D/I; no bleeding, hematoma, erythema, exudate or edema  Ext: no edema; distal pulses intact  Skin: warm, dry, no rash     ECG: sinus bradycardia 54 bpm      Assessment:   64 year old male with HTN, DM type 2, obesity BMI 43.8 s/p gastric bypass 8/2024, OMAYRA on CPAP, CAD, NICM LVEF 30-35%, and persistent atrial fibrillation despite AAD. He presented electively and is now status post uncomplicated FARAPULSE pulsed field ablation of atrial fibrillation (PVI, PW). Resting comfortably.     Plan:   Bedrest x 4 hours, then OOB with assistance and progress as tolerated.   Groin sutures to be removed by EP service in AM.   Radial line to be removed when pt fully awake w/stable VS > 1 hr.   Pending groin status, resume Eliquis 5mg Q 12 hrs.   DO NOT HOLD, INTERRUPT OR REVERSE ANTICOAGULATION WITHOUT EXPLICIT APPROVAL FROM EP SERVICE.   Continue amiodarone.   Start Protonix 40mg once daily for 1 week.   Continue other home medications.   Gentle hydration overnight.   Please encourage incentive spirometry and ambulation once able.  Observation and monitoring on telemetry overnight with anticipated discharge in the AM and outpt follow up in 2-4 weeks.

## 2024-12-04 NOTE — DISCHARGE NOTE PROVIDER - NSDCFUSCHEDAPPT_GEN_ALL_CORE_FT
Jay Ramos  [facility] Physician [facility]  DERM [address]  Scheduled Appointment: 12/17/2024    [facility]  UROLOGY [address]  Scheduled Appointment: 12/30/2024    Óscar Banks  [facility]  UROLOGY [address]  Scheduled Appointment: 12/30/2024    Sunshine Schultz  [facility]  ELECTROPH [address]  Scheduled Appointment: 01/06/2025

## 2024-12-04 NOTE — DISCHARGE NOTE PROVIDER - NSDCFUADDAPPT_GEN_ALL_CORE_FT
Continue all home medications as usual.  Start Protonix 40mg once daily for 1 week.   Follow up with Dr. Schultz in 2-4 weeks.

## 2024-12-04 NOTE — ASU PATIENT PROFILE, ADULT - FALL HARM RISK - HARM RISK INTERVENTIONS

## 2024-12-04 NOTE — PROGRESS NOTE ADULT - SUBJECTIVE AND OBJECTIVE BOX
64 year old male with HTN, DM type 2, obesity BMI 43.8 s/p gastric bypass 8/2024, OMAYRA on CPAP, CAD, NICM LVEF 30-35%, and persistent atrial fibrillation despite AAD. He presents today for elective catheter ablation of atrial fibrillation.     PST and labs from 11//27/24 reviewed; denies interval change.   Confirms NPO > 8 hrs. Last dose Eliquis 5mg 12/3/24 PM.   Last dose Jardiance 11/30, last dose Entresto 12/2.     - iv heplock   - confirmatory type and screen   - 2 units PRBC on hold  - consent brittni FOSTER

## 2024-12-05 ENCOUNTER — TRANSCRIPTION ENCOUNTER (OUTPATIENT)
Age: 64
End: 2024-12-05

## 2024-12-05 ENCOUNTER — NON-APPOINTMENT (OUTPATIENT)
Age: 64
End: 2024-12-05

## 2024-12-05 VITALS
SYSTOLIC BLOOD PRESSURE: 131 MMHG | OXYGEN SATURATION: 96 % | RESPIRATION RATE: 18 BRPM | DIASTOLIC BLOOD PRESSURE: 83 MMHG | TEMPERATURE: 98 F | HEART RATE: 50 BPM

## 2024-12-05 LAB
ANION GAP SERPL CALC-SCNC: 12 MMOL/L — SIGNIFICANT CHANGE UP (ref 5–17)
BUN SERPL-MCNC: 24.3 MG/DL — HIGH (ref 8–20)
CALCIUM SERPL-MCNC: 8.1 MG/DL — LOW (ref 8.4–10.5)
CHLORIDE SERPL-SCNC: 104 MMOL/L — SIGNIFICANT CHANGE UP (ref 96–108)
CO2 SERPL-SCNC: 23 MMOL/L — SIGNIFICANT CHANGE UP (ref 22–29)
CREAT SERPL-MCNC: 0.84 MG/DL — SIGNIFICANT CHANGE UP (ref 0.5–1.3)
EGFR: 96 ML/MIN/1.73M2 — SIGNIFICANT CHANGE UP
GLUCOSE BLDC GLUCOMTR-MCNC: 109 MG/DL — HIGH (ref 70–99)
GLUCOSE BLDC GLUCOMTR-MCNC: 119 MG/DL — HIGH (ref 70–99)
GLUCOSE SERPL-MCNC: 117 MG/DL — HIGH (ref 70–99)
HCT VFR BLD CALC: 38.9 % — LOW (ref 39–50)
HGB BLD-MCNC: 12.3 G/DL — LOW (ref 13–17)
MAGNESIUM SERPL-MCNC: 2 MG/DL — SIGNIFICANT CHANGE UP (ref 1.6–2.6)
MCHC RBC-ENTMCNC: 30.4 PG — SIGNIFICANT CHANGE UP (ref 27–34)
MCHC RBC-ENTMCNC: 31.6 G/DL — LOW (ref 32–36)
MCV RBC AUTO: 96 FL — SIGNIFICANT CHANGE UP (ref 80–100)
PLATELET # BLD AUTO: 243 K/UL — SIGNIFICANT CHANGE UP (ref 150–400)
POTASSIUM SERPL-MCNC: 4.3 MMOL/L — SIGNIFICANT CHANGE UP (ref 3.5–5.3)
POTASSIUM SERPL-SCNC: 4.3 MMOL/L — SIGNIFICANT CHANGE UP (ref 3.5–5.3)
RBC # BLD: 4.05 M/UL — LOW (ref 4.2–5.8)
RBC # FLD: 16.9 % — HIGH (ref 10.3–14.5)
SODIUM SERPL-SCNC: 139 MMOL/L — SIGNIFICANT CHANGE UP (ref 135–145)
WBC # BLD: 11.3 K/UL — HIGH (ref 3.8–10.5)
WBC # FLD AUTO: 11.3 K/UL — HIGH (ref 3.8–10.5)

## 2024-12-05 PROCEDURE — C1894: CPT

## 2024-12-05 PROCEDURE — 80048 BASIC METABOLIC PNL TOTAL CA: CPT

## 2024-12-05 PROCEDURE — 92960 CARDIOVERSION ELECTRIC EXT: CPT | Mod: 59

## 2024-12-05 PROCEDURE — C1766: CPT

## 2024-12-05 PROCEDURE — C1733: CPT

## 2024-12-05 PROCEDURE — 93010 ELECTROCARDIOGRAM REPORT: CPT

## 2024-12-05 PROCEDURE — 36415 COLL VENOUS BLD VENIPUNCTURE: CPT

## 2024-12-05 PROCEDURE — 93657 TX L/R ATRIAL FIB ADDL: CPT

## 2024-12-05 PROCEDURE — 82962 GLUCOSE BLOOD TEST: CPT

## 2024-12-05 PROCEDURE — 93622 COMP EP EVAL L VENTR PAC&REC: CPT

## 2024-12-05 PROCEDURE — 83735 ASSAY OF MAGNESIUM: CPT

## 2024-12-05 PROCEDURE — C1732: CPT

## 2024-12-05 PROCEDURE — C1769: CPT

## 2024-12-05 PROCEDURE — 93656 COMPRE EP EVAL ABLTJ ATR FIB: CPT

## 2024-12-05 PROCEDURE — 85027 COMPLETE CBC AUTOMATED: CPT

## 2024-12-05 PROCEDURE — C9399: CPT

## 2024-12-05 PROCEDURE — C1893: CPT

## 2024-12-05 PROCEDURE — C1759: CPT

## 2024-12-05 PROCEDURE — 93005 ELECTROCARDIOGRAM TRACING: CPT

## 2024-12-05 RX ORDER — PANTOPRAZOLE SODIUM 40 MG/1
1 TABLET, DELAYED RELEASE ORAL
Qty: 7 | Refills: 0
Start: 2024-12-05 | End: 2024-12-11

## 2024-12-05 RX ADMIN — AMIODARONE HYDROCHLORIDE 200 MILLIGRAM(S): 200 TABLET ORAL at 05:11

## 2024-12-05 RX ADMIN — APIXABAN 5 MILLIGRAM(S): 2.5 TABLET, FILM COATED ORAL at 05:10

## 2024-12-05 RX ADMIN — SACUBITRIL AND VALSARTAN 1 TABLET(S): 24; 26 TABLET, FILM COATED ORAL at 05:11

## 2024-12-05 RX ADMIN — PANTOPRAZOLE SODIUM 40 MILLIGRAM(S): 40 TABLET, DELAYED RELEASE ORAL at 05:10

## 2024-12-05 RX ADMIN — METOPROLOL TARTRATE 50 MILLIGRAM(S): 100 TABLET, FILM COATED ORAL at 05:10

## 2024-12-05 RX ADMIN — BUMETANIDE 2 MILLIGRAM(S): 1 TABLET ORAL at 05:10

## 2024-12-05 NOTE — DISCHARGE NOTE NURSING/CASE MANAGEMENT/SOCIAL WORK - FINANCIAL ASSISTANCE
[facility] provides services at a reduced cost to those who are determined to be eligible through [facility]’s financial assistance program. Information regarding [facility]’s financial assistance program can be found by going to [email or web address] or by calling [phone].

## 2024-12-05 NOTE — PROGRESS NOTE ADULT - SUBJECTIVE AND OBJECTIVE BOX
Pt doing well POD #1 s/p FARAPULSE pulsed field ablation of atrial fibrillation (PVI, PW) via b/l FV access. Pt seen and examined, reports feeling well, denies any complaints. Morning labs and telemetry reviewed.     EKG: SB at 51bpm. KS 191ms. QRSD 110ms. QTc =480ms   TELE:    MEDICATIONS  (STANDING):  aMIOdarone    Tablet 200 milliGRAM(s) Oral daily  apixaban 5 milliGRAM(s) Oral every 12 hours  atorvastatin 40 milliGRAM(s) Oral at bedtime  buMETAnide 2 milliGRAM(s) Oral daily  dextrose 5%. 1000 milliLiter(s) (50 mL/Hr) IV Continuous <Continuous>  dextrose 5%. 1000 milliLiter(s) (100 mL/Hr) IV Continuous <Continuous>  dextrose 50% Injectable 25 Gram(s) IV Push once  dextrose 50% Injectable 12.5 Gram(s) IV Push once  dextrose 50% Injectable 25 Gram(s) IV Push once  glucagon  Injectable 1 milliGRAM(s) IntraMuscular once  insulin lispro (ADMELOG) corrective regimen sliding scale   SubCutaneous three times a day before meals  magnesium oxide 400 milliGRAM(s) Oral daily  metoprolol succinate ER 50 milliGRAM(s) Oral daily  metoprolol succinate ER 25 milliGRAM(s) Oral at bedtime  pantoprazole    Tablet 40 milliGRAM(s) Oral before breakfast  sacubitril 24 mG/valsartan 26 mG 1 Tablet(s) Oral two times a day  sodium chloride 0.9%. 1000 milliLiter(s) (100 mL/Hr) IV Continuous <Continuous>    MEDICATIONS  (PRN):  acetaminophen     Tablet .. 650 milliGRAM(s) Oral every 6 hours PRN Mild Pain (1 - 3), Moderate Pain (4 - 6)  ALPRAZolam 0.25 milliGRAM(s) Oral every 6 hours PRN anxiety/insomnia  benzocaine/menthol Lozenge 1 Lozenge Oral every 2 hours PRN Sore Throat  dextrose Oral Gel 15 Gram(s) Oral once PRN Blood Glucose LESS THAN 70 milliGRAM(s)/deciliter  ketorolac   Injectable 15 milliGRAM(s) IV Push every 6 hours PRN Moderate Pain (4 - 6)  ondansetron Injectable 4 milliGRAM(s) IV Push every 6 hours PRN Nausea and/or Vomiting  oxycodone    5 mG/acetaminophen 325 mG 1 Tablet(s) Oral every 6 hours PRN Severe Pain (7 - 10)      Allergies    No Known Allergies    Intolerances      PAST MEDICAL & SURGICAL HISTORY:  Obesity      Afib      HTN (hypertension)      History of tonsillectomy          Vital Signs Last 24 Hrs  T(C): 36.3 (05 Dec 2024 05:05), Max: 36.8 (04 Dec 2024 23:46)  T(F): 97.4 (05 Dec 2024 05:05), Max: 98.3 (04 Dec 2024 23:46)  HR: 60 (05 Dec 2024 05:05) (50 - 61)  BP: 105/73 (05 Dec 2024 05:05) (97/64 - 114/70)  BP(mean): 84 (05 Dec 2024 05:05) (72 - 85)  RR: 18 (05 Dec 2024 05:05) (12 - 18)  SpO2: 97% (05 Dec 2024 05:05) (94% - 99%)    Parameters below as of 05 Dec 2024 05:05  Patient On (Oxygen Delivery Method): room air        Physical Exam:  Constitutional: NAD, AAOx3  Cardiovascular: +S1S2 RRR  Pulmonary: CTA b/l, unlabored  Abd: soft NTND +BS  Groins: C/D/I bilaterally; no bleeding, hematoma, edema  Extremities: no pedal edema, +distal pulses b/l  Neuro: non focal, AMBROSIO x4    LABS:                        12.3   11.30 )-----------( 243      ( 05 Dec 2024 04:28 )             38.9     12-05    139  |  104  |  24.3[H]  ----------------------------<  117[H]  4.3   |  23.0  |  0.84    Ca    8.1[L]      05 Dec 2024 04:28  Mg     2.0     12-05        Urinalysis Basic - ( 05 Dec 2024 04:28 )    Color: x / Appearance: x / SG: x / pH: x  Gluc: 117 mg/dL / Ketone: x  / Bili: x / Urobili: x   Blood: x / Protein: x / Nitrite: x   Leuk Esterase: x / RBC: x / WBC x   Sq Epi: x / Non Sq Epi: x / Bacteria: x        Assessment:   *** y/o female h/o ***; now POD #1 s/p ***.     Plan:     Access site care and activity limitations reviewed w/ pt.   Outpt f/up in 2-4 weeks.    Pt doing well POD #1 s/p FARAPULSE pulsed field ablation of atrial fibrillation (PVI, PW) via b/l FV access. Pt seen and examined, reports feeling well, denies any complaints. Morning labs and telemetry reviewed.     EKG: SB at 51bpm. ME 191ms. QRSD 110ms. QTc =480ms   TELE: SB @ 50bpm. SB 40s overnight, APCs    MEDICATIONS  (STANDING):  aMIOdarone    Tablet 200 milliGRAM(s) Oral daily  apixaban 5 milliGRAM(s) Oral every 12 hours  atorvastatin 40 milliGRAM(s) Oral at bedtime  buMETAnide 2 milliGRAM(s) Oral daily  dextrose 5%. 1000 milliLiter(s) (50 mL/Hr) IV Continuous <Continuous>  dextrose 5%. 1000 milliLiter(s) (100 mL/Hr) IV Continuous <Continuous>  dextrose 50% Injectable 25 Gram(s) IV Push once  dextrose 50% Injectable 12.5 Gram(s) IV Push once  dextrose 50% Injectable 25 Gram(s) IV Push once  glucagon  Injectable 1 milliGRAM(s) IntraMuscular once  insulin lispro (ADMELOG) corrective regimen sliding scale   SubCutaneous three times a day before meals  magnesium oxide 400 milliGRAM(s) Oral daily  metoprolol succinate ER 50 milliGRAM(s) Oral daily  metoprolol succinate ER 25 milliGRAM(s) Oral at bedtime  pantoprazole    Tablet 40 milliGRAM(s) Oral before breakfast  sacubitril 24 mG/valsartan 26 mG 1 Tablet(s) Oral two times a day  sodium chloride 0.9%. 1000 milliLiter(s) (100 mL/Hr) IV Continuous <Continuous>    MEDICATIONS  (PRN):  acetaminophen     Tablet .. 650 milliGRAM(s) Oral every 6 hours PRN Mild Pain (1 - 3), Moderate Pain (4 - 6)  ALPRAZolam 0.25 milliGRAM(s) Oral every 6 hours PRN anxiety/insomnia  benzocaine/menthol Lozenge 1 Lozenge Oral every 2 hours PRN Sore Throat  dextrose Oral Gel 15 Gram(s) Oral once PRN Blood Glucose LESS THAN 70 milliGRAM(s)/deciliter  ketorolac   Injectable 15 milliGRAM(s) IV Push every 6 hours PRN Moderate Pain (4 - 6)  ondansetron Injectable 4 milliGRAM(s) IV Push every 6 hours PRN Nausea and/or Vomiting  oxycodone    5 mG/acetaminophen 325 mG 1 Tablet(s) Oral every 6 hours PRN Severe Pain (7 - 10)      Allergies  No Known Allergies      PAST MEDICAL & SURGICAL HISTORY:  Obesity  Afib  HTN (hypertension)  History of tonsillectomy  OMAYRA on CPAP  NICM LVEF 30-35%    Vital Signs Last 24 Hrs  T(C): 36.3 (05 Dec 2024 05:05), Max: 36.8 (04 Dec 2024 23:46)  T(F): 97.4 (05 Dec 2024 05:05), Max: 98.3 (04 Dec 2024 23:46)  HR: 60 (05 Dec 2024 05:05) (50 - 61)  BP: 105/73 (05 Dec 2024 05:05) (97/64 - 114/70)  BP(mean): 84 (05 Dec 2024 05:05) (72 - 85)  RR: 18 (05 Dec 2024 05:05) (12 - 18)  SpO2: 97% (05 Dec 2024 05:05) (94% - 99%)    Parameters below as of 05 Dec 2024 05:05  Patient On (Oxygen Delivery Method): room air        Physical Exam:  Constitutional: NAD, AAOx3  Cardiovascular: +S1S2 RRR  Pulmonary: CTA b/l, unlabored  Abd: obese, soft NTTP  Groins: C/D/I bilaterally; no bleeding, hematoma, edema  Extremities: 1+ LE edema  Neuro: non focal, AMBROSIO x4    LABS:                        12.3   11.30 )-----------( 243      ( 05 Dec 2024 04:28 )             38.9     12-05    139  |  104  |  24.3[H]  ----------------------------<  117[H]  4.3   |  23.0  |  0.84    Ca    8.1[L]      05 Dec 2024 04:28  Mg     2.0     12-05        Urinalysis Basic - ( 05 Dec 2024 04:28 )    Color: x / Appearance: x / SG: x / pH: x  Gluc: 117 mg/dL / Ketone: x  / Bili: x / Urobili: x   Blood: x / Protein: x / Nitrite: x   Leuk Esterase: x / RBC: x / WBC x   Sq Epi: x / Non Sq Epi: x / Bacteria: x        Assessment:   64 year old male with HTN, DM type 2, obesity (BMI 43.8) s/p gastric bypass 8/2024, OMAYRA on CPAP, CAD, NICM LVEF 30-35%, and persistent atrial fibrillation despite AAD. He presented electively on 12/5/2024 for and is now POD #1 status post uncomplicated FARAPULSE pulsed field ablation of atrial fibrillation (PVI, JAMES).     Plan:    Start Protonix 40mg once daily for 1 week.   Continue other home medications.   Access site care and activity limitations reviewed w/ pt.   Outpt f/up in 2-4 weeks.

## 2024-12-05 NOTE — DISCHARGE NOTE NURSING/CASE MANAGEMENT/SOCIAL WORK - PATIENT PORTAL LINK FT
You can access the FollowMyHealth Patient Portal offered by [facility] by registering at the following website: [email or web address]. By joining [facility]’s FollowMyHealth portal, you will also be able to view your health information using other applications (apps) compatible with our system.

## 2024-12-05 NOTE — DISCHARGE NOTE NURSING/CASE MANAGEMENT/SOCIAL WORK - NSDCVIVACCINE_GEN_ALL_CORE_FT
influenza, injectable, quadrivalent, preservative free; 15-Sep-2017 18:08; Murphy Dobbs (RN); [employer]; [id number]; IntraMuscular; Deltoid Right.; 0.5 milliLiter(s); VIS (VIS Published: 07-Aug-2015, VIS Presented: 15-Sep-2017);

## 2024-12-16 ENCOUNTER — RX RENEWAL (OUTPATIENT)
Age: 64
End: 2024-12-16

## 2024-12-16 ENCOUNTER — NON-APPOINTMENT (OUTPATIENT)
Age: 64
End: 2024-12-16

## 2024-12-16 ENCOUNTER — APPOINTMENT (OUTPATIENT)
Dept: ELECTROPHYSIOLOGY | Facility: CLINIC | Age: 64
End: 2024-12-16
Payer: COMMERCIAL

## 2024-12-16 VITALS
DIASTOLIC BLOOD PRESSURE: 70 MMHG | OXYGEN SATURATION: 98 % | BODY MASS INDEX: 41.75 KG/M2 | HEART RATE: 95 BPM | SYSTOLIC BLOOD PRESSURE: 136 MMHG | HEIGHT: 73 IN | RESPIRATION RATE: 16 BRPM | WEIGHT: 315 LBS

## 2024-12-16 DIAGNOSIS — I48.91 UNSPECIFIED ATRIAL FIBRILLATION: ICD-10-CM

## 2024-12-16 PROCEDURE — 99213 OFFICE O/P EST LOW 20 MIN: CPT | Mod: 25

## 2024-12-16 PROCEDURE — 93000 ELECTROCARDIOGRAM COMPLETE: CPT

## 2024-12-16 RX ORDER — CHLORHEXIDINE GLUCONATE 4 %
400 (240 MG) LIQUID (ML) TOPICAL
Qty: 30 | Refills: 3 | Status: ACTIVE | COMMUNITY
Start: 2024-12-16 | End: 1900-01-01

## 2024-12-17 ENCOUNTER — APPOINTMENT (OUTPATIENT)
Dept: DERMATOLOGY | Facility: CLINIC | Age: 64
End: 2024-12-17

## 2024-12-17 VITALS — BODY MASS INDEX: 41.75 KG/M2 | WEIGHT: 315 LBS | HEIGHT: 73 IN

## 2024-12-17 DIAGNOSIS — L73.2 HIDRADENITIS SUPPURATIVA: ICD-10-CM

## 2024-12-17 PROCEDURE — G2211 COMPLEX E/M VISIT ADD ON: CPT | Mod: NC

## 2024-12-17 PROCEDURE — 99214 OFFICE O/P EST MOD 30 MIN: CPT

## 2024-12-20 RX ORDER — METOPROLOL TARTRATE 100 MG/1
1 TABLET, FILM COATED ORAL
Refills: 0 | DISCHARGE

## 2024-12-23 ENCOUNTER — OUTPATIENT (OUTPATIENT)
Dept: OUTPATIENT SERVICES | Facility: HOSPITAL | Age: 64
LOS: 1 days | End: 2024-12-23
Payer: COMMERCIAL

## 2024-12-23 DIAGNOSIS — Z90.89 ACQUIRED ABSENCE OF OTHER ORGANS: Chronic | ICD-10-CM

## 2024-12-23 PROCEDURE — 92960 CARDIOVERSION ELECTRIC EXT: CPT

## 2024-12-23 PROCEDURE — 93005 ELECTROCARDIOGRAM TRACING: CPT

## 2024-12-23 RX ORDER — SACUBITRIL AND VALSARTAN 24; 26 MG/1; MG/1
1 TABLET, FILM COATED ORAL
Refills: 0 | DISCHARGE

## 2024-12-23 RX ORDER — AMIODARONE HYDROCHLORIDE 200 MG/1
1 TABLET ORAL
Refills: 0 | DISCHARGE

## 2024-12-24 DIAGNOSIS — I48.91 UNSPECIFIED ATRIAL FIBRILLATION: ICD-10-CM

## 2024-12-25 DIAGNOSIS — I48.91 UNSPECIFIED ATRIAL FIBRILLATION: ICD-10-CM

## 2024-12-26 ENCOUNTER — RX RENEWAL (OUTPATIENT)
Age: 64
End: 2024-12-26

## 2024-12-30 ENCOUNTER — APPOINTMENT (OUTPATIENT)
Dept: ELECTROPHYSIOLOGY | Facility: CLINIC | Age: 64
End: 2024-12-30
Payer: COMMERCIAL

## 2024-12-30 ENCOUNTER — APPOINTMENT (OUTPATIENT)
Dept: UROLOGY | Facility: CLINIC | Age: 64
End: 2024-12-30

## 2024-12-30 ENCOUNTER — NON-APPOINTMENT (OUTPATIENT)
Age: 64
End: 2024-12-30

## 2024-12-30 VITALS
WEIGHT: 313 LBS | DIASTOLIC BLOOD PRESSURE: 52 MMHG | BODY MASS INDEX: 41.48 KG/M2 | OXYGEN SATURATION: 96 % | HEART RATE: 54 BPM | HEIGHT: 73 IN | SYSTOLIC BLOOD PRESSURE: 131 MMHG

## 2024-12-30 DIAGNOSIS — I48.91 UNSPECIFIED ATRIAL FIBRILLATION: ICD-10-CM

## 2024-12-30 PROCEDURE — 93000 ELECTROCARDIOGRAM COMPLETE: CPT

## 2024-12-30 PROCEDURE — 99213 OFFICE O/P EST LOW 20 MIN: CPT | Mod: 25

## 2025-01-06 ENCOUNTER — APPOINTMENT (OUTPATIENT)
Dept: ELECTROPHYSIOLOGY | Facility: CLINIC | Age: 65
End: 2025-01-06

## 2025-01-08 ENCOUNTER — TRANSCRIPTION ENCOUNTER (OUTPATIENT)
Age: 65
End: 2025-01-08

## 2025-01-08 ENCOUNTER — NON-APPOINTMENT (OUTPATIENT)
Age: 65
End: 2025-01-08

## 2025-01-13 ENCOUNTER — APPOINTMENT (OUTPATIENT)
Dept: UROLOGY | Facility: CLINIC | Age: 65
End: 2025-01-13
Payer: COMMERCIAL

## 2025-01-13 VITALS
BODY MASS INDEX: 41.48 KG/M2 | OXYGEN SATURATION: 99 % | HEART RATE: 65 BPM | HEIGHT: 73 IN | WEIGHT: 313 LBS | TEMPERATURE: 97.5 F | DIASTOLIC BLOOD PRESSURE: 65 MMHG | SYSTOLIC BLOOD PRESSURE: 135 MMHG

## 2025-01-13 DIAGNOSIS — R97.20 ELEVATED PROSTATE, SPECIFIC ANTIGEN [PSA]: ICD-10-CM

## 2025-01-13 PROCEDURE — 99215 OFFICE O/P EST HI 40 MIN: CPT

## 2025-01-14 ENCOUNTER — NON-APPOINTMENT (OUTPATIENT)
Age: 65
End: 2025-01-14

## 2025-01-14 LAB
ANION GAP SERPL CALC-SCNC: 13 MMOL/L
BUN SERPL-MCNC: 16 MG/DL
CALCIUM SERPL-MCNC: 8.7 MG/DL
CHLORIDE SERPL-SCNC: 104 MMOL/L
CO2 SERPL-SCNC: 24 MMOL/L
CREAT SERPL-MCNC: 0.9 MG/DL
EGFR: 94 ML/MIN/1.73M2
GLUCOSE SERPL-MCNC: 115 MG/DL
HCT VFR BLD CALC: 44.7 %
HGB BLD-MCNC: 14.1 G/DL
MCHC RBC-ENTMCNC: 31.5 G/DL
MCHC RBC-ENTMCNC: 32 PG
MCV RBC AUTO: 101.4 FL
PLATELET # BLD AUTO: 352 K/UL
POTASSIUM SERPL-SCNC: 4.1 MMOL/L
PSA FREE FLD-MCNC: 9 %
PSA FREE SERPL-MCNC: 0.52 NG/ML
PSA SERPL-MCNC: 5.72 NG/ML
RBC # BLD: 4.41 M/UL
RBC # FLD: 16.8 %
SODIUM SERPL-SCNC: 141 MMOL/L
WBC # FLD AUTO: 9.88 K/UL

## 2025-01-27 ENCOUNTER — APPOINTMENT (OUTPATIENT)
Dept: ELECTROPHYSIOLOGY | Facility: CLINIC | Age: 65
End: 2025-01-27
Payer: COMMERCIAL

## 2025-01-27 VITALS
BODY MASS INDEX: 41.62 KG/M2 | SYSTOLIC BLOOD PRESSURE: 127 MMHG | WEIGHT: 314 LBS | HEIGHT: 73 IN | OXYGEN SATURATION: 98 % | DIASTOLIC BLOOD PRESSURE: 73 MMHG | HEART RATE: 92 BPM

## 2025-01-27 DIAGNOSIS — I48.91 UNSPECIFIED ATRIAL FIBRILLATION: ICD-10-CM

## 2025-01-27 DIAGNOSIS — G47.33 OBSTRUCTIVE SLEEP APNEA (ADULT) (PEDIATRIC): ICD-10-CM

## 2025-01-27 DIAGNOSIS — E66.01 MORBID (SEVERE) OBESITY DUE TO EXCESS CALORIES: ICD-10-CM

## 2025-01-27 DIAGNOSIS — I10 ESSENTIAL (PRIMARY) HYPERTENSION: ICD-10-CM

## 2025-01-27 PROCEDURE — 99215 OFFICE O/P EST HI 40 MIN: CPT | Mod: 25

## 2025-01-27 PROCEDURE — 93000 ELECTROCARDIOGRAM COMPLETE: CPT

## 2025-02-05 ENCOUNTER — OUTPATIENT (OUTPATIENT)
Dept: OUTPATIENT SERVICES | Facility: HOSPITAL | Age: 65
LOS: 1 days | End: 2025-02-05
Payer: COMMERCIAL

## 2025-02-05 ENCOUNTER — RESULT REVIEW (OUTPATIENT)
Age: 65
End: 2025-02-05

## 2025-02-05 DIAGNOSIS — Z90.89 ACQUIRED ABSENCE OF OTHER ORGANS: Chronic | ICD-10-CM

## 2025-02-05 DIAGNOSIS — I48.91 UNSPECIFIED ATRIAL FIBRILLATION: ICD-10-CM

## 2025-02-05 PROCEDURE — 93306 TTE W/DOPPLER COMPLETE: CPT | Mod: 26

## 2025-02-05 PROCEDURE — 93306 TTE W/DOPPLER COMPLETE: CPT

## 2025-02-06 DIAGNOSIS — I48.91 UNSPECIFIED ATRIAL FIBRILLATION: ICD-10-CM

## 2025-02-10 ENCOUNTER — APPOINTMENT (OUTPATIENT)
Dept: ELECTROPHYSIOLOGY | Facility: CLINIC | Age: 65
End: 2025-02-10
Payer: COMMERCIAL

## 2025-02-10 VITALS
HEART RATE: 55 BPM | OXYGEN SATURATION: 97 % | SYSTOLIC BLOOD PRESSURE: 125 MMHG | WEIGHT: 301 LBS | DIASTOLIC BLOOD PRESSURE: 57 MMHG | BODY MASS INDEX: 39.89 KG/M2 | HEIGHT: 73 IN

## 2025-02-10 DIAGNOSIS — G47.33 OBSTRUCTIVE SLEEP APNEA (ADULT) (PEDIATRIC): ICD-10-CM

## 2025-02-10 DIAGNOSIS — E11.9 TYPE 2 DIABETES MELLITUS W/OUT COMPLICATIONS: ICD-10-CM

## 2025-02-10 DIAGNOSIS — I48.91 UNSPECIFIED ATRIAL FIBRILLATION: ICD-10-CM

## 2025-02-10 PROCEDURE — 93000 ELECTROCARDIOGRAM COMPLETE: CPT

## 2025-02-10 PROCEDURE — 99215 OFFICE O/P EST HI 40 MIN: CPT | Mod: 25

## 2025-02-10 RX ORDER — AMIODARONE HYDROCHLORIDE 200 MG/1
200 TABLET ORAL DAILY
Qty: 30 | Refills: 3 | Status: ACTIVE | COMMUNITY
Start: 2025-02-10 | End: 1900-01-01

## 2025-02-10 RX ORDER — METOPROLOL SUCCINATE 25 MG/1
25 TABLET, EXTENDED RELEASE ORAL
Qty: 180 | Refills: 0 | Status: DISCONTINUED | COMMUNITY
Start: 2025-02-10 | End: 2025-02-10

## 2025-02-10 RX ORDER — METOPROLOL SUCCINATE 50 MG/1
50 TABLET, EXTENDED RELEASE ORAL
Qty: 90 | Refills: 0 | Status: ACTIVE | COMMUNITY
Start: 2025-02-10 | End: 1900-01-01

## 2025-02-13 DIAGNOSIS — R97.20 ELEVATED PROSTATE, SPECIFIC ANTIGEN [PSA]: ICD-10-CM

## 2025-02-14 RX ORDER — SODIUM CHLORIDE 9 G/1000ML
1000 INJECTION, SOLUTION INTRAVENOUS
Refills: 0 | Status: DISCONTINUED | OUTPATIENT
Start: 2025-02-18 | End: 2025-03-04

## 2025-02-14 NOTE — H&P PST ADULT - HISTORY OF PRESENT ILLNESS
Telephone interview  63y/o male medical h/o Obesity (reports he is now 305lbs after losing weight s/p gastric sleeve 8/2024), OMAYRA w/cpap use, CAD,  CHF LVEF, Afib s/p cardiac ablation takes Eliquis. Pt reports Elevated PSA, for scheduled Transperineal MR Fusion Guided Prostate Biopsy on 2/18/25    Last cardiac workup:TTE from 6/2024 shows mildly dilated LV with LVEF 30-35%, recently done 2/2025 EF 55-60%

## 2025-02-14 NOTE — H&P PST ADULT - PROBLEM SELECTOR PLAN 5
Pt instructed to take meds as prescribed - pt reports instructed to HOLD Eliquis 2 days before surgery

## 2025-02-14 NOTE — H&P PST ADULT - NSICDXPASTSURGICALHX_GEN_ALL_CORE_FT
PAST SURGICAL HISTORY:  History of cardiac ablation for atrial fibrillation     History of tonsillectomy     S/P gastric sleeve procedure

## 2025-02-14 NOTE — H&P PST ADULT - PROBLEM SELECTOR PLAN 1
Pre-op instructions given. Pt verbalized understanding  Pt instructed to HOLD all NSAIDs/Herbal supplement/Multivitamins 7 days before surgery  Hold Jardiance 3 days before surgery  Accu-chek ordered STAT for day of procedure   Anticoagulants: HOLD Eliquis 2 days before surgery instructed by cardiologist as per pt  Pending: M/C

## 2025-02-14 NOTE — H&P PST ADULT - EKG AND INTERPRETATION
Last cardiac workup, TTE from 6/2024 shows mildly dilated LV with LVEF 30-35%, recently done 2/2025 EF 55-60%

## 2025-02-14 NOTE — H&P PST ADULT - PROBLEM SELECTOR PLAN 3
Pt instructed to take meds as prescribed - to hold Jardiance 3 days before surgery  Accu-chek ordered STAT for day of procedure

## 2025-02-14 NOTE — H&P PST ADULT - PROBLEM/PLAN-7
Diagnosis/Prognosis/Treatment Options/Hospice Referral DISPLAY PLAN FREE TEXT Diagnosis/Prognosis/Treatment Options/Palliative Care Referral yes

## 2025-02-14 NOTE — H&P PST ADULT - NSICDXPASTMEDICALHX_GEN_ALL_CORE_FT
PAST MEDICAL HISTORY:  Afib     CAD (coronary artery disease)     CHF (congestive heart failure)     Elevated prostate specific antigen (PSA)     HTN (hypertension)     Obesity     OMAYRA on CPAP

## 2025-02-15 LAB
APPEARANCE: CLEAR
BACTERIA UR CULT: NORMAL
BACTERIA: NEGATIVE /HPF
BILIRUBIN URINE: NEGATIVE
BLOOD URINE: NEGATIVE
CAST: 0 /LPF
COLOR: YELLOW
EPITHELIAL CELLS: 0 /HPF
GLUCOSE QUALITATIVE U: >=1000 MG/DL
KETONES URINE: NEGATIVE MG/DL
LEUKOCYTE ESTERASE URINE: NEGATIVE
MICROSCOPIC-UA: NORMAL
NITRITE URINE: NEGATIVE
PH URINE: 5.5
PROTEIN URINE: NORMAL MG/DL
RED BLOOD CELLS URINE: 0 /HPF
SPECIFIC GRAVITY URINE: >1.03
UROBILINOGEN URINE: 0.2 MG/DL
WHITE BLOOD CELLS URINE: 0 /HPF

## 2025-02-18 ENCOUNTER — RESULT REVIEW (OUTPATIENT)
Age: 65
End: 2025-02-18

## 2025-02-18 ENCOUNTER — TRANSCRIPTION ENCOUNTER (OUTPATIENT)
Age: 65
End: 2025-02-18

## 2025-02-18 ENCOUNTER — APPOINTMENT (OUTPATIENT)
Dept: UROLOGY | Facility: HOSPITAL | Age: 65
End: 2025-02-18

## 2025-02-18 ENCOUNTER — OUTPATIENT (OUTPATIENT)
Dept: OUTPATIENT SERVICES | Facility: HOSPITAL | Age: 65
LOS: 1 days | End: 2025-02-18
Payer: MEDICARE

## 2025-02-18 VITALS
OXYGEN SATURATION: 95 % | TEMPERATURE: 98 F | SYSTOLIC BLOOD PRESSURE: 119 MMHG | HEART RATE: 57 BPM | DIASTOLIC BLOOD PRESSURE: 87 MMHG | RESPIRATION RATE: 16 BRPM

## 2025-02-18 VITALS
HEART RATE: 58 BPM | OXYGEN SATURATION: 96 % | HEIGHT: 73 IN | WEIGHT: 299.83 LBS | TEMPERATURE: 98 F | SYSTOLIC BLOOD PRESSURE: 135 MMHG | RESPIRATION RATE: 16 BRPM | DIASTOLIC BLOOD PRESSURE: 73 MMHG

## 2025-02-18 DIAGNOSIS — G47.33 OBSTRUCTIVE SLEEP APNEA (ADULT) (PEDIATRIC): ICD-10-CM

## 2025-02-18 DIAGNOSIS — R97.20 ELEVATED PROSTATE SPECIFIC ANTIGEN [PSA]: ICD-10-CM

## 2025-02-18 DIAGNOSIS — I50.9 HEART FAILURE, UNSPECIFIED: ICD-10-CM

## 2025-02-18 DIAGNOSIS — E11.9 TYPE 2 DIABETES MELLITUS WITHOUT COMPLICATIONS: ICD-10-CM

## 2025-02-18 DIAGNOSIS — Z90.3 ACQUIRED ABSENCE OF STOMACH [PART OF]: Chronic | ICD-10-CM

## 2025-02-18 DIAGNOSIS — E66.9 OBESITY, UNSPECIFIED: ICD-10-CM

## 2025-02-18 DIAGNOSIS — I48.91 UNSPECIFIED ATRIAL FIBRILLATION: ICD-10-CM

## 2025-02-18 DIAGNOSIS — Z98.890 OTHER SPECIFIED POSTPROCEDURAL STATES: Chronic | ICD-10-CM

## 2025-02-18 DIAGNOSIS — Z90.89 ACQUIRED ABSENCE OF OTHER ORGANS: Chronic | ICD-10-CM

## 2025-02-18 DIAGNOSIS — I10 ESSENTIAL (PRIMARY) HYPERTENSION: ICD-10-CM

## 2025-02-18 LAB
GLUCOSE BLDC GLUCOMTR-MCNC: 95 MG/DL — SIGNIFICANT CHANGE UP (ref 70–99)
GLUCOSE BLDC GLUCOMTR-MCNC: 95 MG/DL — SIGNIFICANT CHANGE UP (ref 70–99)

## 2025-02-18 PROCEDURE — 55700: CPT

## 2025-02-18 PROCEDURE — 55706 BX PRST8 NDL SAT SAMPLING: CPT

## 2025-02-18 PROCEDURE — G0416: CPT | Mod: 26

## 2025-02-18 PROCEDURE — 82962 GLUCOSE BLOOD TEST: CPT

## 2025-02-18 PROCEDURE — G0416: CPT

## 2025-02-18 PROCEDURE — 76999F: CUSTOM | Mod: 26

## 2025-02-18 PROCEDURE — C9399: CPT

## 2025-02-18 RX ORDER — SODIUM CHLORIDE 9 G/1000ML
1000 INJECTION, SOLUTION INTRAVENOUS
Refills: 0 | Status: DISCONTINUED | OUTPATIENT
Start: 2025-02-18 | End: 2025-02-18

## 2025-02-18 RX ORDER — HYDROMORPHONE/SOD CHLOR,ISO/PF 2 MG/10 ML
0.5 SYRINGE (ML) INJECTION
Refills: 0 | Status: DISCONTINUED | OUTPATIENT
Start: 2025-02-18 | End: 2025-02-18

## 2025-02-18 RX ORDER — HYDROMORPHONE/SOD CHLOR,ISO/PF 2 MG/10 ML
1 SYRINGE (ML) INJECTION
Refills: 0 | Status: DISCONTINUED | OUTPATIENT
Start: 2025-02-18 | End: 2025-02-18

## 2025-02-18 RX ORDER — ONDANSETRON HCL/PF 4 MG/2 ML
4 VIAL (ML) INJECTION ONCE
Refills: 0 | Status: DISCONTINUED | OUTPATIENT
Start: 2025-02-18 | End: 2025-02-18

## 2025-02-18 RX ADMIN — SODIUM CHLORIDE 50 MILLILITER(S): 9 INJECTION, SOLUTION INTRAVENOUS at 10:15

## 2025-02-18 NOTE — BRIEF OPERATIVE NOTE - FIRST ASSIST NAME
Internal medicine progress note.  08/07/18    Subjective: Patient is awake and comfortable.      ROS weakness    Vitals reviewed  145/71, 86, 20, 96.7 °F      Skin: warm and dry  Neck: supple, +Trach capped  Lung: scattered crackles but adequate air flow   CVS: S 1 and S 2  Abdomen: bowel sound present, +PEG  CNS: Cranial nerves are intact  Motor and sensory are unchanged.        Recent Labs  Lab 08/07/18  0535   WBC 4.5   RBC 3.80*   HGB 10.2*   HCT 35.1*               Recent Labs  Lab 08/07/18  0535 08/06/18  1450 08/04/18  0452   SODIUM 140 139 142   POTASSIUM 4.8 5.2* 4.8   CHLORIDE 105 103 106   CO2 28 32 33*   BUN 42* 37* 43*   CREATININE 1.49* 1.60* 1.51*   GLUCOSE 82 122* 117*   CALCIUM 9.0 8.9 8.7         Impression:  J96.01  Acute respiratory failure with hypoxia  J96.10 Chronic respiratory failure, unspecified whether with hypoxia or hypercapnia  I48.0 Paroxysmal atrial fibrillation  I10 Essential Hypertension  E46 Malnutrition  N18.3 Chronic kidney disease, stage 3 (moderate)  M45.9 Ankylosing spondylitis of unspecified sites in spine  Hx of MAC infection   Hx of cavitation lung lesion .  D64.9 Anemia, unspecified      Plan:  Hyperkalemia  DC losartan  Bradycardia dc metoprolol.  Add hydralazine 25 mg po tid .  Trach capped  Labs reviewed  Continue regular diet  Trach care capped.  Duonebs  Amiodarone  Warfarin  Aspirin, Lipitor  Ethambutol 800 mg, Moxifloxacin  And rifampin.  Mirtazapine  PPI  Quetiapine      ______________________________________________________________    Whitley Coronel acting as a scribe for Dr. Bradford.     I have reviewed the information recorded by the scribe for accuracy and agree with its contents.     Dr. Sanchez MD  Physician #:[id number]     Isabel Queen (Physician Assistant)

## 2025-02-18 NOTE — ASU DISCHARGE PLAN (ADULT/PEDIATRIC) - ASU DC SPECIAL INSTRUCTIONSFT
Expect Blood in stool and urine for the next few days. Change dressing and keep covered as necessary with gauze and bacitracin. For pain take tylenol as directed on bottle every 4-6 hours for pain. Ice to area as needed.    Please see Dr. Breaux or your own urologist for pathology results    PLEASE RESTART YOUR ELIQUIS TOMORROW NIGHT IF YOUR URINE IS YELLOW.

## 2025-02-18 NOTE — ASU DISCHARGE PLAN (ADULT/PEDIATRIC) - FINANCIAL ASSISTANCE
[facility] provides services at a reduced cost to those who are determined to be eligible through [facility]’s financial assistance program. Information regarding [facility]’s financial assistance program can be found by going to [email or web address] or by calling [phone].

## 2025-02-18 NOTE — ASU PATIENT PROFILE, ADULT - FALL HARM RISK - HARM RISK INTERVENTIONS

## 2025-02-18 NOTE — BRIEF OPERATIVE NOTE - NSICDXBRIEFPROCEDURE_GEN_ALL_CORE_FT
PROCEDURES:  Biopsy of prostate using magnetic resonance imaging-ultrasound fusion guidance 18-Feb-2025 13:53:22  Charlie Breaux

## 2025-02-21 LAB — SURGICAL PATHOLOGY STUDY: SIGNIFICANT CHANGE UP

## 2025-02-26 ENCOUNTER — RX RENEWAL (OUTPATIENT)
Age: 65
End: 2025-02-26

## 2025-02-26 DIAGNOSIS — C61 MALIGNANT NEOPLASM OF PROSTATE: ICD-10-CM

## 2025-03-04 ENCOUNTER — RX RENEWAL (OUTPATIENT)
Age: 65
End: 2025-03-04

## 2025-03-17 ENCOUNTER — RX RENEWAL (OUTPATIENT)
Age: 65
End: 2025-03-17

## 2025-03-17 PROBLEM — I25.10 ATHEROSCLEROTIC HEART DISEASE OF NATIVE CORONARY ARTERY WITHOUT ANGINA PECTORIS: Chronic | Status: ACTIVE | Noted: 2025-02-14

## 2025-03-17 PROBLEM — R97.20 ELEVATED PROSTATE SPECIFIC ANTIGEN [PSA]: Chronic | Status: ACTIVE | Noted: 2025-02-14

## 2025-03-17 PROBLEM — G47.33 OBSTRUCTIVE SLEEP APNEA (ADULT) (PEDIATRIC): Chronic | Status: ACTIVE | Noted: 2025-02-14

## 2025-03-17 PROBLEM — I50.9 HEART FAILURE, UNSPECIFIED: Chronic | Status: ACTIVE | Noted: 2025-02-14

## 2025-03-24 ENCOUNTER — OUTPATIENT (OUTPATIENT)
Dept: OUTPATIENT SERVICES | Facility: HOSPITAL | Age: 65
LOS: 1 days | End: 2025-03-24
Payer: COMMERCIAL

## 2025-03-24 ENCOUNTER — APPOINTMENT (OUTPATIENT)
Dept: NUCLEAR MEDICINE | Facility: CLINIC | Age: 65
End: 2025-03-24
Payer: COMMERCIAL

## 2025-03-24 DIAGNOSIS — Z90.89 ACQUIRED ABSENCE OF OTHER ORGANS: Chronic | ICD-10-CM

## 2025-03-24 DIAGNOSIS — C61 MALIGNANT NEOPLASM OF PROSTATE: ICD-10-CM

## 2025-03-24 PROCEDURE — 78816 PET IMAGE W/CT FULL BODY: CPT | Mod: 26

## 2025-03-24 PROCEDURE — 78816 PET IMAGE W/CT FULL BODY: CPT

## 2025-03-24 PROCEDURE — A9596: CPT

## 2025-03-26 ENCOUNTER — NON-APPOINTMENT (OUTPATIENT)
Age: 65
End: 2025-03-26

## 2025-03-26 ENCOUNTER — RX RENEWAL (OUTPATIENT)
Age: 65
End: 2025-03-26

## 2025-03-26 RX ORDER — BICALUTAMIDE 50 MG/1
50 TABLET ORAL
Qty: 30 | Refills: 0 | Status: ACTIVE | COMMUNITY
Start: 2025-03-26 | End: 1900-01-01

## 2025-03-27 ENCOUNTER — APPOINTMENT (OUTPATIENT)
Dept: DERMATOLOGY | Facility: CLINIC | Age: 65
End: 2025-03-27
Payer: COMMERCIAL

## 2025-03-27 VITALS — WEIGHT: 295 LBS | BODY MASS INDEX: 38.92 KG/M2

## 2025-03-27 DIAGNOSIS — L73.2 HIDRADENITIS SUPPURATIVA: ICD-10-CM

## 2025-03-27 PROCEDURE — 99214 OFFICE O/P EST MOD 30 MIN: CPT

## 2025-03-31 ENCOUNTER — APPOINTMENT (OUTPATIENT)
Dept: SURGERY | Facility: CLINIC | Age: 65
End: 2025-03-31
Payer: COMMERCIAL

## 2025-03-31 VITALS
HEART RATE: 63 BPM | HEIGHT: 73 IN | BODY MASS INDEX: 39.1 KG/M2 | DIASTOLIC BLOOD PRESSURE: 79 MMHG | SYSTOLIC BLOOD PRESSURE: 128 MMHG | WEIGHT: 295 LBS | OXYGEN SATURATION: 97 % | TEMPERATURE: 98.1 F

## 2025-03-31 PROCEDURE — 99214 OFFICE O/P EST MOD 30 MIN: CPT

## 2025-04-02 ENCOUNTER — APPOINTMENT (OUTPATIENT)
Dept: RADIATION ONCOLOGY | Facility: CLINIC | Age: 65
End: 2025-04-02
Payer: MEDICARE

## 2025-04-02 VITALS
OXYGEN SATURATION: 96 % | WEIGHT: 295 LBS | SYSTOLIC BLOOD PRESSURE: 141 MMHG | HEIGHT: 73 IN | DIASTOLIC BLOOD PRESSURE: 67 MMHG | BODY MASS INDEX: 39.1 KG/M2 | HEART RATE: 57 BPM | RESPIRATION RATE: 16 BRPM

## 2025-04-02 DIAGNOSIS — Z80.1 FAMILY HISTORY OF MALIGNANT NEOPLASM OF TRACHEA, BRONCHUS AND LUNG: ICD-10-CM

## 2025-04-02 DIAGNOSIS — Z80.42 FAMILY HISTORY OF MALIGNANT NEOPLASM OF PROSTATE: ICD-10-CM

## 2025-04-02 DIAGNOSIS — Z86.79 PERSONAL HISTORY OF OTHER DISEASES OF THE CIRCULATORY SYSTEM: ICD-10-CM

## 2025-04-02 DIAGNOSIS — Z80.0 FAMILY HISTORY OF MALIGNANT NEOPLASM OF DIGESTIVE ORGANS: ICD-10-CM

## 2025-04-02 DIAGNOSIS — Z86.69 PERSONAL HISTORY OF OTHER DISEASES OF THE NERVOUS SYSTEM AND SENSE ORGANS: ICD-10-CM

## 2025-04-02 PROCEDURE — G2212 PROLONG OUTPT/OFFICE VIS: CPT

## 2025-04-02 PROCEDURE — 99205 OFFICE O/P NEW HI 60 MIN: CPT

## 2025-04-07 ENCOUNTER — APPOINTMENT (OUTPATIENT)
Dept: GASTROENTEROLOGY | Facility: CLINIC | Age: 65
End: 2025-04-07

## 2025-04-07 VITALS
HEIGHT: 73 IN | BODY MASS INDEX: 40.42 KG/M2 | DIASTOLIC BLOOD PRESSURE: 74 MMHG | WEIGHT: 305 LBS | SYSTOLIC BLOOD PRESSURE: 146 MMHG

## 2025-04-07 DIAGNOSIS — Z12.11 ENCOUNTER FOR SCREENING FOR MALIGNANT NEOPLASM OF COLON: ICD-10-CM

## 2025-04-07 DIAGNOSIS — Z12.12 ENCOUNTER FOR SCREENING FOR MALIGNANT NEOPLASM OF COLON: ICD-10-CM

## 2025-04-07 PROCEDURE — 99203 OFFICE O/P NEW LOW 30 MIN: CPT

## 2025-04-07 RX ORDER — SODIUM SULFATE, POTASSIUM SULFATE AND MAGNESIUM SULFATE 1.6; 3.13; 17.5 G/177ML; G/177ML; G/177ML
17.5-3.13-1.6 SOLUTION ORAL
Qty: 2 | Refills: 0 | Status: ACTIVE | COMMUNITY
Start: 2025-04-07 | End: 1900-01-01

## 2025-04-14 ENCOUNTER — APPOINTMENT (OUTPATIENT)
Dept: UROLOGY | Facility: CLINIC | Age: 65
End: 2025-04-14
Payer: MEDICARE

## 2025-04-14 ENCOUNTER — APPOINTMENT (OUTPATIENT)
Dept: RADIATION ONCOLOGY | Facility: CLINIC | Age: 65
End: 2025-04-14
Payer: MEDICARE

## 2025-04-14 VITALS
OXYGEN SATURATION: 95 % | WEIGHT: 305 LBS | HEIGHT: 73 IN | SYSTOLIC BLOOD PRESSURE: 140 MMHG | HEART RATE: 65 BPM | DIASTOLIC BLOOD PRESSURE: 70 MMHG | TEMPERATURE: 96.7 F | BODY MASS INDEX: 40.42 KG/M2

## 2025-04-14 PROCEDURE — 96402 CHEMO HORMON ANTINEOPL SQ/IM: CPT

## 2025-04-14 PROCEDURE — G2211 COMPLEX E/M VISIT ADD ON: CPT

## 2025-04-14 PROCEDURE — 99214 OFFICE O/P EST MOD 30 MIN: CPT

## 2025-04-14 RX ORDER — LEUPROLIDE ACETATE 22.5 MG/.375ML
22.5 INJECTION, SUSPENSION, EXTENDED RELEASE SUBCUTANEOUS
Refills: 0 | Status: COMPLETED | OUTPATIENT
Start: 2025-04-14

## 2025-04-14 RX ADMIN — LEUPROLIDE ACETATE 0 MG: 22.5 INJECTION, SUSPENSION, EXTENDED RELEASE SUBCUTANEOUS at 00:00

## 2025-04-16 ENCOUNTER — NON-APPOINTMENT (OUTPATIENT)
Age: 65
End: 2025-04-16

## 2025-04-17 ENCOUNTER — RX RENEWAL (OUTPATIENT)
Age: 65
End: 2025-04-17

## 2025-05-08 ENCOUNTER — OUTPATIENT (OUTPATIENT)
Dept: OUTPATIENT SERVICES | Facility: HOSPITAL | Age: 65
LOS: 1 days | End: 2025-05-08
Payer: COMMERCIAL

## 2025-05-08 ENCOUNTER — TRANSCRIPTION ENCOUNTER (OUTPATIENT)
Age: 65
End: 2025-05-08

## 2025-05-08 ENCOUNTER — APPOINTMENT (OUTPATIENT)
Dept: GASTROENTEROLOGY | Facility: HOSPITAL | Age: 65
End: 2025-05-08

## 2025-05-08 ENCOUNTER — RESULT REVIEW (OUTPATIENT)
Age: 65
End: 2025-05-08

## 2025-05-08 DIAGNOSIS — Z90.3 ACQUIRED ABSENCE OF STOMACH [PART OF]: Chronic | ICD-10-CM

## 2025-05-08 DIAGNOSIS — Z98.890 OTHER SPECIFIED POSTPROCEDURAL STATES: Chronic | ICD-10-CM

## 2025-05-08 DIAGNOSIS — Z90.89 ACQUIRED ABSENCE OF OTHER ORGANS: Chronic | ICD-10-CM

## 2025-05-08 DIAGNOSIS — Z12.11 ENCOUNTER FOR SCREENING FOR MALIGNANT NEOPLASM OF COLON: ICD-10-CM

## 2025-05-08 LAB — GLUCOSE BLDC GLUCOMTR-MCNC: 91 MG/DL — SIGNIFICANT CHANGE UP (ref 70–99)

## 2025-05-08 PROCEDURE — 45380 COLONOSCOPY AND BIOPSY: CPT | Mod: 59

## 2025-05-08 PROCEDURE — 45385 COLONOSCOPY W/LESION REMOVAL: CPT

## 2025-05-08 PROCEDURE — 45385 COLONOSCOPY W/LESION REMOVAL: CPT | Mod: PT

## 2025-05-08 PROCEDURE — 88305 TISSUE EXAM BY PATHOLOGIST: CPT

## 2025-05-08 PROCEDURE — C1889: CPT

## 2025-05-08 PROCEDURE — 82962 GLUCOSE BLOOD TEST: CPT

## 2025-05-08 PROCEDURE — 88305 TISSUE EXAM BY PATHOLOGIST: CPT | Mod: 26

## 2025-05-08 PROCEDURE — 45380 COLONOSCOPY AND BIOPSY: CPT | Mod: PT,XS

## 2025-05-08 DEVICE — CLIP HEMO INSTINCT PLUS ENDOSCOPIC: Type: IMPLANTABLE DEVICE | Status: FUNCTIONAL

## 2025-05-08 DEVICE — CLIP RESOLUTION 360 235CM: Type: IMPLANTABLE DEVICE | Status: FUNCTIONAL

## 2025-05-12 LAB — SURGICAL PATHOLOGY STUDY: SIGNIFICANT CHANGE UP

## 2025-05-12 PROCEDURE — 77263 THER RADIOLOGY TX PLNG CPLX: CPT

## 2025-05-12 PROCEDURE — 77333 RADIATION TREATMENT AID(S): CPT

## 2025-05-16 PROCEDURE — 77300 RADIATION THERAPY DOSE PLAN: CPT

## 2025-05-16 PROCEDURE — 77338 DESIGN MLC DEVICE FOR IMRT: CPT

## 2025-05-16 PROCEDURE — 77301 RADIOTHERAPY DOSE PLAN IMRT: CPT

## 2025-05-27 PROCEDURE — 77427 RADIATION TX MANAGEMENT X5: CPT

## 2025-05-27 PROCEDURE — G6015: CPT

## 2025-05-27 PROCEDURE — 77014: CPT

## 2025-05-28 ENCOUNTER — NON-APPOINTMENT (OUTPATIENT)
Age: 65
End: 2025-05-28

## 2025-05-28 ENCOUNTER — APPOINTMENT (OUTPATIENT)
Dept: CARDIOLOGY | Facility: CLINIC | Age: 65
End: 2025-05-28
Payer: MEDICARE

## 2025-05-28 VITALS
DIASTOLIC BLOOD PRESSURE: 79 MMHG | OXYGEN SATURATION: 98 % | SYSTOLIC BLOOD PRESSURE: 156 MMHG | WEIGHT: 293 LBS | HEART RATE: 61 BPM | BODY MASS INDEX: 38.83 KG/M2 | HEIGHT: 73 IN

## 2025-05-28 PROCEDURE — 99205 OFFICE O/P NEW HI 60 MIN: CPT

## 2025-05-28 PROCEDURE — 77014: CPT

## 2025-05-28 PROCEDURE — 93000 ELECTROCARDIOGRAM COMPLETE: CPT

## 2025-05-28 PROCEDURE — G6015: CPT

## 2025-05-28 RX ORDER — METOPROLOL SUCCINATE 25 MG/1
25 TABLET, EXTENDED RELEASE ORAL
Qty: 90 | Refills: 3 | Status: ACTIVE | COMMUNITY
Start: 2025-05-28 | End: 1900-01-01

## 2025-05-29 ENCOUNTER — NON-APPOINTMENT (OUTPATIENT)
Age: 65
End: 2025-05-29

## 2025-05-29 VITALS
HEIGHT: 73 IN | HEART RATE: 59 BPM | WEIGHT: 293 LBS | BODY MASS INDEX: 38.83 KG/M2 | OXYGEN SATURATION: 97 % | RESPIRATION RATE: 16 BRPM

## 2025-05-29 PROCEDURE — G6015: CPT

## 2025-05-29 PROCEDURE — 77014: CPT

## 2025-05-30 PROCEDURE — 77014: CPT

## 2025-05-30 PROCEDURE — G6015: CPT

## 2025-06-02 ENCOUNTER — NON-APPOINTMENT (OUTPATIENT)
Age: 65
End: 2025-06-02

## 2025-06-02 PROCEDURE — G6015: CPT

## 2025-06-02 PROCEDURE — 77014: CPT

## 2025-06-02 PROCEDURE — 77336 RADIATION PHYSICS CONSULT: CPT

## 2025-06-03 PROCEDURE — 77427 RADIATION TX MANAGEMENT X5: CPT

## 2025-06-03 PROCEDURE — G6015: CPT

## 2025-06-03 PROCEDURE — 77014: CPT

## 2025-06-04 PROCEDURE — G6015: CPT

## 2025-06-04 PROCEDURE — 77014: CPT

## 2025-06-05 PROCEDURE — G6015: CPT

## 2025-06-05 PROCEDURE — 77014: CPT

## 2025-06-09 ENCOUNTER — NON-APPOINTMENT (OUTPATIENT)
Age: 65
End: 2025-06-09

## 2025-06-09 PROCEDURE — G6015: CPT

## 2025-06-09 PROCEDURE — 77014: CPT

## 2025-06-10 PROCEDURE — G6015: CPT

## 2025-06-10 PROCEDURE — 77014: CPT

## 2025-06-11 PROCEDURE — 77014: CPT

## 2025-06-11 PROCEDURE — 77336 RADIATION PHYSICS CONSULT: CPT

## 2025-06-11 PROCEDURE — G6015: CPT

## 2025-06-12 PROCEDURE — 77014: CPT

## 2025-06-12 PROCEDURE — G6015: CPT

## 2025-06-12 PROCEDURE — 77427 RADIATION TX MANAGEMENT X5: CPT

## 2025-06-13 PROCEDURE — G6015: CPT

## 2025-06-13 PROCEDURE — 77014: CPT

## 2025-06-16 ENCOUNTER — NON-APPOINTMENT (OUTPATIENT)
Age: 65
End: 2025-06-16

## 2025-06-16 PROCEDURE — 77014: CPT

## 2025-06-16 PROCEDURE — G6015: CPT

## 2025-06-17 PROCEDURE — G6015: CPT

## 2025-06-17 PROCEDURE — 77014: CPT

## 2025-06-18 PROCEDURE — 77427 RADIATION TX MANAGEMENT X5: CPT

## 2025-06-18 PROCEDURE — 77336 RADIATION PHYSICS CONSULT: CPT

## 2025-06-18 PROCEDURE — 77014: CPT

## 2025-06-18 PROCEDURE — G6015: CPT

## 2025-06-19 PROCEDURE — 77014: CPT

## 2025-06-19 PROCEDURE — G6015: CPT

## 2025-06-20 PROCEDURE — 77014: CPT

## 2025-06-20 PROCEDURE — G6015: CPT

## 2025-06-23 ENCOUNTER — NON-APPOINTMENT (OUTPATIENT)
Age: 65
End: 2025-06-23

## 2025-06-23 PROCEDURE — 77014: CPT

## 2025-06-23 PROCEDURE — G6015: CPT

## 2025-06-24 PROCEDURE — 77336 RADIATION PHYSICS CONSULT: CPT

## 2025-06-24 PROCEDURE — G6015: CPT

## 2025-06-24 PROCEDURE — 77014: CPT

## 2025-06-25 PROCEDURE — 77014: CPT

## 2025-06-25 PROCEDURE — G6015: CPT

## 2025-06-25 PROCEDURE — 77427 RADIATION TX MANAGEMENT X5: CPT

## 2025-06-26 PROCEDURE — G6015: CPT

## 2025-06-26 PROCEDURE — 77014: CPT

## 2025-06-27 PROCEDURE — G6015: CPT

## 2025-06-27 PROCEDURE — 77014: CPT

## 2025-06-30 ENCOUNTER — NON-APPOINTMENT (OUTPATIENT)
Age: 65
End: 2025-06-30

## 2025-06-30 PROCEDURE — G6015: CPT

## 2025-06-30 PROCEDURE — 77014: CPT

## 2025-06-30 RX ORDER — TAMSULOSIN HYDROCHLORIDE 0.4 MG/1
0.4 CAPSULE ORAL
Qty: 30 | Refills: 3 | Status: ACTIVE | COMMUNITY
Start: 2025-06-30 | End: 1900-01-01

## 2025-07-01 PROCEDURE — 77336 RADIATION PHYSICS CONSULT: CPT

## 2025-07-01 PROCEDURE — 77014: CPT

## 2025-07-01 PROCEDURE — G6015: CPT

## 2025-07-02 PROCEDURE — G6015: CPT

## 2025-07-02 PROCEDURE — 77427 RADIATION TX MANAGEMENT X5: CPT

## 2025-07-02 PROCEDURE — 77014: CPT

## 2025-07-03 PROCEDURE — 77014: CPT

## 2025-07-03 PROCEDURE — G6015: CPT

## 2025-07-07 ENCOUNTER — NON-APPOINTMENT (OUTPATIENT)
Age: 65
End: 2025-07-07

## 2025-07-07 PROCEDURE — 77014: CPT

## 2025-07-07 PROCEDURE — G6015: CPT

## 2025-07-07 PROCEDURE — 77336 RADIATION PHYSICS CONSULT: CPT

## 2025-07-21 ENCOUNTER — APPOINTMENT (OUTPATIENT)
Dept: UROLOGY | Facility: CLINIC | Age: 65
End: 2025-07-21
Payer: MEDICARE

## 2025-07-21 VITALS
HEIGHT: 73 IN | BODY MASS INDEX: 38.83 KG/M2 | WEIGHT: 293 LBS | SYSTOLIC BLOOD PRESSURE: 130 MMHG | TEMPERATURE: 97.1 F | HEART RATE: 67 BPM | OXYGEN SATURATION: 96 % | DIASTOLIC BLOOD PRESSURE: 80 MMHG

## 2025-07-21 PROCEDURE — 96402 CHEMO HORMON ANTINEOPL SQ/IM: CPT

## 2025-08-08 ENCOUNTER — OUTPATIENT (OUTPATIENT)
Dept: OUTPATIENT SERVICES | Facility: HOSPITAL | Age: 65
LOS: 1 days | End: 2025-08-08

## 2025-08-08 VITALS
HEART RATE: 71 BPM | OXYGEN SATURATION: 98 % | TEMPERATURE: 97 F | RESPIRATION RATE: 17 BRPM | DIASTOLIC BLOOD PRESSURE: 53 MMHG | WEIGHT: 294.98 LBS | HEIGHT: 72 IN | SYSTOLIC BLOOD PRESSURE: 117 MMHG

## 2025-08-08 DIAGNOSIS — Z92.89 PERSONAL HISTORY OF OTHER MEDICAL TREATMENT: Chronic | ICD-10-CM

## 2025-08-08 DIAGNOSIS — I48.91 UNSPECIFIED ATRIAL FIBRILLATION: ICD-10-CM

## 2025-08-08 DIAGNOSIS — Z90.89 ACQUIRED ABSENCE OF OTHER ORGANS: Chronic | ICD-10-CM

## 2025-08-08 DIAGNOSIS — Z98.890 OTHER SPECIFIED POSTPROCEDURAL STATES: Chronic | ICD-10-CM

## 2025-08-08 DIAGNOSIS — Z01.818 ENCOUNTER FOR OTHER PREPROCEDURAL EXAMINATION: ICD-10-CM

## 2025-08-08 DIAGNOSIS — G47.30 SLEEP APNEA, UNSPECIFIED: ICD-10-CM

## 2025-08-08 DIAGNOSIS — L73.2 HIDRADENITIS SUPPURATIVA: ICD-10-CM

## 2025-08-08 DIAGNOSIS — I10 ESSENTIAL (PRIMARY) HYPERTENSION: ICD-10-CM

## 2025-08-08 DIAGNOSIS — Z90.3 ACQUIRED ABSENCE OF STOMACH [PART OF]: Chronic | ICD-10-CM

## 2025-08-08 DIAGNOSIS — E78.5 HYPERLIPIDEMIA, UNSPECIFIED: ICD-10-CM

## 2025-08-08 LAB
ANION GAP SERPL CALC-SCNC: 7 MMOL/L — SIGNIFICANT CHANGE UP (ref 5–17)
APTT BLD: 35.8 SEC — SIGNIFICANT CHANGE UP (ref 26.1–36.8)
BASOPHILS # BLD AUTO: 0.03 K/UL — SIGNIFICANT CHANGE UP (ref 0–0.2)
BASOPHILS NFR BLD AUTO: 0.4 % — SIGNIFICANT CHANGE UP (ref 0–2)
BUN SERPL-MCNC: 20 MG/DL — SIGNIFICANT CHANGE UP (ref 7–23)
CALCIUM SERPL-MCNC: 8.7 MG/DL — SIGNIFICANT CHANGE UP (ref 8.5–10.1)
CHLORIDE SERPL-SCNC: 104 MMOL/L — SIGNIFICANT CHANGE UP (ref 96–108)
CO2 SERPL-SCNC: 27 MMOL/L — SIGNIFICANT CHANGE UP (ref 22–31)
CREAT SERPL-MCNC: 0.92 MG/DL — SIGNIFICANT CHANGE UP (ref 0.5–1.3)
EGFR: 91 ML/MIN/1.73M2 — SIGNIFICANT CHANGE UP
EGFR: 91 ML/MIN/1.73M2 — SIGNIFICANT CHANGE UP
EOSINOPHIL # BLD AUTO: 0.14 K/UL — SIGNIFICANT CHANGE UP (ref 0–0.5)
EOSINOPHIL NFR BLD AUTO: 1.7 % — SIGNIFICANT CHANGE UP (ref 0–6)
GLUCOSE SERPL-MCNC: 106 MG/DL — HIGH (ref 70–99)
HCT VFR BLD CALC: 31.2 % — LOW (ref 39–50)
HGB BLD-MCNC: 9.9 G/DL — LOW (ref 13–17)
IMM GRANULOCYTES NFR BLD AUTO: 0.2 % — SIGNIFICANT CHANGE UP (ref 0–0.9)
INR BLD: 1.36 RATIO — HIGH (ref 0.85–1.16)
LYMPHOCYTES # BLD AUTO: 1.14 K/UL — SIGNIFICANT CHANGE UP (ref 1–3.3)
LYMPHOCYTES # BLD AUTO: 13.9 % — SIGNIFICANT CHANGE UP (ref 13–44)
MCHC RBC-ENTMCNC: 30.6 PG — SIGNIFICANT CHANGE UP (ref 27–34)
MCHC RBC-ENTMCNC: 31.7 G/DL — LOW (ref 32–36)
MCV RBC AUTO: 96.3 FL — SIGNIFICANT CHANGE UP (ref 80–100)
MONOCYTES # BLD AUTO: 0.55 K/UL — SIGNIFICANT CHANGE UP (ref 0–0.9)
MONOCYTES NFR BLD AUTO: 6.7 % — SIGNIFICANT CHANGE UP (ref 2–14)
NEUTROPHILS # BLD AUTO: 6.35 K/UL — SIGNIFICANT CHANGE UP (ref 1.8–7.4)
NEUTROPHILS NFR BLD AUTO: 77.1 % — HIGH (ref 43–77)
NRBC BLD AUTO-RTO: 0 /100 WBCS — SIGNIFICANT CHANGE UP (ref 0–0)
PLATELET # BLD AUTO: 330 K/UL — SIGNIFICANT CHANGE UP (ref 150–400)
POTASSIUM SERPL-MCNC: 3.6 MMOL/L — SIGNIFICANT CHANGE UP (ref 3.5–5.3)
POTASSIUM SERPL-SCNC: 3.6 MMOL/L — SIGNIFICANT CHANGE UP (ref 3.5–5.3)
PROTHROM AB SERPL-ACNC: 15.3 SEC — HIGH (ref 9.9–13.4)
RBC # BLD: 3.24 M/UL — LOW (ref 4.2–5.8)
RBC # FLD: 16.9 % — HIGH (ref 10.3–14.5)
SODIUM SERPL-SCNC: 138 MMOL/L — SIGNIFICANT CHANGE UP (ref 135–145)
WBC # BLD: 8.23 K/UL — SIGNIFICANT CHANGE UP (ref 3.8–10.5)
WBC # FLD AUTO: 8.23 K/UL — SIGNIFICANT CHANGE UP (ref 3.8–10.5)

## 2025-08-11 ENCOUNTER — NON-APPOINTMENT (OUTPATIENT)
Age: 65
End: 2025-08-11

## 2025-08-11 ENCOUNTER — APPOINTMENT (OUTPATIENT)
Dept: RADIATION ONCOLOGY | Facility: CLINIC | Age: 65
End: 2025-08-11
Payer: MEDICARE

## 2025-08-11 VITALS
SYSTOLIC BLOOD PRESSURE: 123 MMHG | WEIGHT: 295 LBS | OXYGEN SATURATION: 98 % | DIASTOLIC BLOOD PRESSURE: 63 MMHG | BODY MASS INDEX: 39.1 KG/M2 | HEART RATE: 72 BPM | RESPIRATION RATE: 16 BRPM | HEIGHT: 73 IN

## 2025-08-11 PROCEDURE — 99024 POSTOP FOLLOW-UP VISIT: CPT

## 2025-08-13 ENCOUNTER — NON-APPOINTMENT (OUTPATIENT)
Age: 65
End: 2025-08-13

## 2025-08-13 LAB
PSA SERPL-MCNC: 0.37 NG/ML
PSA SERPL-MCNC: 0.41 NG/ML

## 2025-08-14 ENCOUNTER — NON-APPOINTMENT (OUTPATIENT)
Age: 65
End: 2025-08-14

## 2025-08-25 ENCOUNTER — RESULT REVIEW (OUTPATIENT)
Age: 65
End: 2025-08-25

## 2025-08-25 ENCOUNTER — INPATIENT (INPATIENT)
Facility: HOSPITAL | Age: 65
LOS: 1 days | Discharge: HOME | End: 2025-08-27
Attending: STUDENT IN AN ORGANIZED HEALTH CARE EDUCATION/TRAINING PROGRAM | Admitting: STUDENT IN AN ORGANIZED HEALTH CARE EDUCATION/TRAINING PROGRAM
Payer: COMMERCIAL

## 2025-08-25 ENCOUNTER — TRANSCRIPTION ENCOUNTER (OUTPATIENT)
Age: 65
End: 2025-08-25

## 2025-08-25 ENCOUNTER — APPOINTMENT (OUTPATIENT)
Dept: SURGERY | Facility: HOSPITAL | Age: 65
End: 2025-08-25

## 2025-08-25 VITALS
HEART RATE: 78 BPM | SYSTOLIC BLOOD PRESSURE: 132 MMHG | TEMPERATURE: 98 F | RESPIRATION RATE: 18 BRPM | DIASTOLIC BLOOD PRESSURE: 70 MMHG | OXYGEN SATURATION: 99 %

## 2025-08-25 DIAGNOSIS — Z90.89 ACQUIRED ABSENCE OF OTHER ORGANS: Chronic | ICD-10-CM

## 2025-08-25 DIAGNOSIS — Z90.3 ACQUIRED ABSENCE OF STOMACH [PART OF]: Chronic | ICD-10-CM

## 2025-08-25 DIAGNOSIS — Z92.89 PERSONAL HISTORY OF OTHER MEDICAL TREATMENT: Chronic | ICD-10-CM

## 2025-08-25 DIAGNOSIS — Z98.890 OTHER SPECIFIED POSTPROCEDURAL STATES: Chronic | ICD-10-CM

## 2025-08-25 LAB
ALBUMIN SERPL ELPH-MCNC: 2.1 G/DL — LOW (ref 3.3–5)
ALP SERPL-CCNC: 64 U/L — SIGNIFICANT CHANGE UP (ref 40–120)
ALT FLD-CCNC: 16 U/L — SIGNIFICANT CHANGE UP (ref 12–78)
ANION GAP SERPL CALC-SCNC: 4 MMOL/L — LOW (ref 5–17)
AST SERPL-CCNC: 14 U/L — LOW (ref 15–37)
BILIRUB SERPL-MCNC: 0.5 MG/DL — SIGNIFICANT CHANGE UP (ref 0.2–1.2)
BUN SERPL-MCNC: 13 MG/DL — SIGNIFICANT CHANGE UP (ref 7–23)
CALCIUM SERPL-MCNC: 7.9 MG/DL — LOW (ref 8.5–10.1)
CHLORIDE SERPL-SCNC: 105 MMOL/L — SIGNIFICANT CHANGE UP (ref 96–108)
CO2 SERPL-SCNC: 28 MMOL/L — SIGNIFICANT CHANGE UP (ref 22–31)
CREAT SERPL-MCNC: 0.76 MG/DL — SIGNIFICANT CHANGE UP (ref 0.5–1.3)
EGFR: 99 ML/MIN/1.73M2 — SIGNIFICANT CHANGE UP
EGFR: 99 ML/MIN/1.73M2 — SIGNIFICANT CHANGE UP
GLUCOSE BLDC GLUCOMTR-MCNC: 168 MG/DL — HIGH (ref 70–99)
GLUCOSE BLDC GLUCOMTR-MCNC: 96 MG/DL — SIGNIFICANT CHANGE UP (ref 70–99)
GLUCOSE SERPL-MCNC: 144 MG/DL — HIGH (ref 70–99)
POTASSIUM SERPL-MCNC: 4 MMOL/L — SIGNIFICANT CHANGE UP (ref 3.5–5.3)
POTASSIUM SERPL-SCNC: 4 MMOL/L — SIGNIFICANT CHANGE UP (ref 3.5–5.3)
PROT SERPL-MCNC: 6.8 GM/DL — SIGNIFICANT CHANGE UP (ref 6–8.3)
SODIUM SERPL-SCNC: 137 MMOL/L — SIGNIFICANT CHANGE UP (ref 135–145)

## 2025-08-25 PROCEDURE — 11470 EXC SKN H/P/P/U SMPL/NTRM: CPT

## 2025-08-25 PROCEDURE — 99232 SBSQ HOSP IP/OBS MODERATE 35: CPT

## 2025-08-25 PROCEDURE — 11462 EXC SKN HDRDNT ING SMPL/NTRM: CPT | Mod: AS,LT

## 2025-08-25 PROCEDURE — 88305 TISSUE EXAM BY PATHOLOGIST: CPT | Mod: 26

## 2025-08-25 PROCEDURE — 99024 POSTOP FOLLOW-UP VISIT: CPT

## 2025-08-25 RX ORDER — PIPERACILLIN-TAZO-DEXTROSE,ISO 3.375G/5
3.38 IV SOLUTION, PIGGYBACK PREMIX FROZEN(ML) INTRAVENOUS ONCE
Refills: 0 | Status: COMPLETED | OUTPATIENT
Start: 2025-08-26 | End: 2025-08-26

## 2025-08-25 RX ORDER — ATORVASTATIN CALCIUM 80 MG/1
40 TABLET, FILM COATED ORAL AT BEDTIME
Refills: 0 | Status: DISCONTINUED | OUTPATIENT
Start: 2025-08-25 | End: 2025-08-27

## 2025-08-25 RX ORDER — PIPERACILLIN-TAZO-DEXTROSE,ISO 3.375G/5
3.38 IV SOLUTION, PIGGYBACK PREMIX FROZEN(ML) INTRAVENOUS ONCE
Refills: 0 | Status: COMPLETED | OUTPATIENT
Start: 2025-08-25 | End: 2025-08-25

## 2025-08-25 RX ORDER — HEPARIN SODIUM 1000 [USP'U]/ML
5000 INJECTION INTRAVENOUS; SUBCUTANEOUS EVERY 8 HOURS
Refills: 0 | Status: DISCONTINUED | OUTPATIENT
Start: 2025-08-25 | End: 2025-08-27

## 2025-08-25 RX ORDER — OXYCODONE HYDROCHLORIDE 30 MG/1
5 TABLET ORAL EVERY 4 HOURS
Refills: 0 | Status: DISCONTINUED | OUTPATIENT
Start: 2025-08-25 | End: 2025-08-27

## 2025-08-25 RX ORDER — ACETAMINOPHEN 500 MG/5ML
975 LIQUID (ML) ORAL EVERY 6 HOURS
Refills: 0 | Status: DISCONTINUED | OUTPATIENT
Start: 2025-08-25 | End: 2025-08-27

## 2025-08-25 RX ORDER — PIPERACILLIN-TAZO-DEXTROSE,ISO 3.375G/5
3.38 IV SOLUTION, PIGGYBACK PREMIX FROZEN(ML) INTRAVENOUS EVERY 8 HOURS
Refills: 0 | Status: DISCONTINUED | OUTPATIENT
Start: 2025-08-26 | End: 2025-08-27

## 2025-08-25 RX ORDER — AMIODARONE HYDROCHLORIDE 50 MG/ML
200 INJECTION, SOLUTION INTRAVENOUS DAILY
Refills: 0 | Status: DISCONTINUED | OUTPATIENT
Start: 2025-08-25 | End: 2025-08-27

## 2025-08-25 RX ORDER — ONDANSETRON HCL/PF 4 MG/2 ML
4 VIAL (ML) INJECTION EVERY 6 HOURS
Refills: 0 | Status: DISCONTINUED | OUTPATIENT
Start: 2025-08-25 | End: 2025-08-27

## 2025-08-25 RX ORDER — HYDROMORPHONE/SOD CHLOR,ISO/PF 2 MG/10 ML
1 SYRINGE (ML) INJECTION
Refills: 0 | Status: DISCONTINUED | OUTPATIENT
Start: 2025-08-25 | End: 2025-08-25

## 2025-08-25 RX ORDER — METOPROLOL SUCCINATE 50 MG/1
50 TABLET, EXTENDED RELEASE ORAL DAILY
Refills: 0 | Status: DISCONTINUED | OUTPATIENT
Start: 2025-08-25 | End: 2025-08-27

## 2025-08-25 RX ORDER — SODIUM CHLORIDE 9 G/1000ML
1000 INJECTION, SOLUTION INTRAVENOUS
Refills: 0 | Status: DISCONTINUED | OUTPATIENT
Start: 2025-08-25 | End: 2025-08-25

## 2025-08-25 RX ORDER — SACUBITRIL AND VALSARTAN 6; 6 MG/1; MG/1
1 PELLET ORAL
Refills: 0 | Status: DISCONTINUED | OUTPATIENT
Start: 2025-08-25 | End: 2025-08-27

## 2025-08-25 RX ORDER — BUMETANIDE 1 MG/1
2 TABLET ORAL DAILY
Refills: 0 | Status: DISCONTINUED | OUTPATIENT
Start: 2025-08-25 | End: 2025-08-27

## 2025-08-25 RX ORDER — TAMSULOSIN HYDROCHLORIDE 0.4 MG/1
1 CAPSULE ORAL
Refills: 0 | DISCHARGE

## 2025-08-25 RX ADMIN — Medication 200 GRAM(S): at 15:28

## 2025-08-25 RX ADMIN — Medication 975 MILLIGRAM(S): at 23:43

## 2025-08-25 RX ADMIN — ATORVASTATIN CALCIUM 40 MILLIGRAM(S): 80 TABLET, FILM COATED ORAL at 21:39

## 2025-08-25 RX ADMIN — SODIUM CHLORIDE 75 MILLILITER(S): 9 INJECTION, SOLUTION INTRAVENOUS at 15:01

## 2025-08-25 RX ADMIN — Medication 25 GRAM(S): at 21:42

## 2025-08-25 RX ADMIN — HEPARIN SODIUM 5000 UNIT(S): 1000 INJECTION INTRAVENOUS; SUBCUTANEOUS at 21:43

## 2025-08-25 RX ADMIN — Medication 975 MILLIGRAM(S): at 18:24

## 2025-08-25 RX ADMIN — SACUBITRIL AND VALSARTAN 1 TABLET(S): 6; 6 PELLET ORAL at 18:24

## 2025-08-26 ENCOUNTER — TRANSCRIPTION ENCOUNTER (OUTPATIENT)
Age: 65
End: 2025-08-26

## 2025-08-26 LAB
A1C WITH ESTIMATED AVERAGE GLUCOSE RESULT: 5.8 % — HIGH (ref 4–5.6)
ANION GAP SERPL CALC-SCNC: 4 MMOL/L — LOW (ref 5–17)
BUN SERPL-MCNC: 13 MG/DL — SIGNIFICANT CHANGE UP (ref 7–23)
CALCIUM SERPL-MCNC: 8.2 MG/DL — LOW (ref 8.5–10.1)
CHLORIDE SERPL-SCNC: 107 MMOL/L — SIGNIFICANT CHANGE UP (ref 96–108)
CO2 SERPL-SCNC: 29 MMOL/L — SIGNIFICANT CHANGE UP (ref 22–31)
CREAT SERPL-MCNC: 0.66 MG/DL — SIGNIFICANT CHANGE UP (ref 0.5–1.3)
EGFR: 103 ML/MIN/1.73M2 — SIGNIFICANT CHANGE UP
EGFR: 103 ML/MIN/1.73M2 — SIGNIFICANT CHANGE UP
ESTIMATED AVERAGE GLUCOSE: 120 MG/DL — HIGH (ref 68–114)
GLUCOSE BLDC GLUCOMTR-MCNC: 135 MG/DL — HIGH (ref 70–99)
GLUCOSE BLDC GLUCOMTR-MCNC: 149 MG/DL — HIGH (ref 70–99)
GLUCOSE BLDC GLUCOMTR-MCNC: 159 MG/DL — HIGH (ref 70–99)
GLUCOSE SERPL-MCNC: 139 MG/DL — HIGH (ref 70–99)
GRAM STN FLD: SIGNIFICANT CHANGE UP
HCT VFR BLD CALC: 27.7 % — LOW (ref 39–50)
HGB BLD-MCNC: 8.2 G/DL — LOW (ref 13–17)
MAGNESIUM SERPL-MCNC: 2.1 MG/DL — SIGNIFICANT CHANGE UP (ref 1.6–2.6)
MCHC RBC-ENTMCNC: 29 PG — SIGNIFICANT CHANGE UP (ref 27–34)
MCHC RBC-ENTMCNC: 29.6 G/DL — LOW (ref 32–36)
MCV RBC AUTO: 97.9 FL — SIGNIFICANT CHANGE UP (ref 80–100)
NRBC BLD AUTO-RTO: 0 /100 WBCS — SIGNIFICANT CHANGE UP (ref 0–0)
PHOSPHATE SERPL-MCNC: 3.6 MG/DL — SIGNIFICANT CHANGE UP (ref 2.5–4.5)
PLATELET # BLD AUTO: 306 K/UL — SIGNIFICANT CHANGE UP (ref 150–400)
POTASSIUM SERPL-MCNC: 4.3 MMOL/L — SIGNIFICANT CHANGE UP (ref 3.5–5.3)
POTASSIUM SERPL-SCNC: 4.3 MMOL/L — SIGNIFICANT CHANGE UP (ref 3.5–5.3)
RBC # BLD: 2.83 M/UL — LOW (ref 4.2–5.8)
RBC # FLD: 16.5 % — HIGH (ref 10.3–14.5)
SODIUM SERPL-SCNC: 140 MMOL/L — SIGNIFICANT CHANGE UP (ref 135–145)
SPECIMEN SOURCE: SIGNIFICANT CHANGE UP
WBC # BLD: 9.68 K/UL — SIGNIFICANT CHANGE UP (ref 3.8–10.5)
WBC # FLD AUTO: 9.68 K/UL — SIGNIFICANT CHANGE UP (ref 3.8–10.5)

## 2025-08-26 PROCEDURE — 99024 POSTOP FOLLOW-UP VISIT: CPT

## 2025-08-26 RX ADMIN — HEPARIN SODIUM 5000 UNIT(S): 1000 INJECTION INTRAVENOUS; SUBCUTANEOUS at 21:19

## 2025-08-26 RX ADMIN — Medication 975 MILLIGRAM(S): at 11:17

## 2025-08-26 RX ADMIN — ATORVASTATIN CALCIUM 40 MILLIGRAM(S): 80 TABLET, FILM COATED ORAL at 21:18

## 2025-08-26 RX ADMIN — HEPARIN SODIUM 5000 UNIT(S): 1000 INJECTION INTRAVENOUS; SUBCUTANEOUS at 06:58

## 2025-08-26 RX ADMIN — Medication 975 MILLIGRAM(S): at 06:58

## 2025-08-26 RX ADMIN — Medication 975 MILLIGRAM(S): at 00:43

## 2025-08-26 RX ADMIN — HEPARIN SODIUM 5000 UNIT(S): 1000 INJECTION INTRAVENOUS; SUBCUTANEOUS at 13:09

## 2025-08-26 RX ADMIN — Medication 975 MILLIGRAM(S): at 17:02

## 2025-08-26 RX ADMIN — Medication 25 GRAM(S): at 21:18

## 2025-08-26 RX ADMIN — OXYCODONE HYDROCHLORIDE 5 MILLIGRAM(S): 30 TABLET ORAL at 14:05

## 2025-08-26 RX ADMIN — SACUBITRIL AND VALSARTAN 1 TABLET(S): 6; 6 PELLET ORAL at 17:01

## 2025-08-26 RX ADMIN — OXYCODONE HYDROCHLORIDE 5 MILLIGRAM(S): 30 TABLET ORAL at 13:09

## 2025-08-26 RX ADMIN — Medication 25 GRAM(S): at 07:56

## 2025-08-26 RX ADMIN — METOPROLOL SUCCINATE 50 MILLIGRAM(S): 50 TABLET, EXTENDED RELEASE ORAL at 07:56

## 2025-08-26 RX ADMIN — Medication 25 GRAM(S): at 03:41

## 2025-08-26 RX ADMIN — AMIODARONE HYDROCHLORIDE 200 MILLIGRAM(S): 50 INJECTION, SOLUTION INTRAVENOUS at 06:58

## 2025-08-26 RX ADMIN — Medication 25 GRAM(S): at 13:08

## 2025-08-26 RX ADMIN — Medication 975 MILLIGRAM(S): at 07:28

## 2025-08-27 VITALS
DIASTOLIC BLOOD PRESSURE: 71 MMHG | TEMPERATURE: 99 F | OXYGEN SATURATION: 96 % | RESPIRATION RATE: 16 BRPM | SYSTOLIC BLOOD PRESSURE: 110 MMHG | HEART RATE: 66 BPM

## 2025-08-27 LAB
ANION GAP SERPL CALC-SCNC: 5 MMOL/L — SIGNIFICANT CHANGE UP (ref 5–17)
BUN SERPL-MCNC: 18 MG/DL — SIGNIFICANT CHANGE UP (ref 7–23)
CALCIUM SERPL-MCNC: 8.2 MG/DL — LOW (ref 8.5–10.1)
CHLORIDE SERPL-SCNC: 108 MMOL/L — SIGNIFICANT CHANGE UP (ref 96–108)
CO2 SERPL-SCNC: 30 MMOL/L — SIGNIFICANT CHANGE UP (ref 22–31)
CREAT SERPL-MCNC: 0.82 MG/DL — SIGNIFICANT CHANGE UP (ref 0.5–1.3)
EGFR: 96 ML/MIN/1.73M2 — SIGNIFICANT CHANGE UP
EGFR: 96 ML/MIN/1.73M2 — SIGNIFICANT CHANGE UP
GLUCOSE SERPL-MCNC: 89 MG/DL — SIGNIFICANT CHANGE UP (ref 70–99)
HCT VFR BLD CALC: 26.8 % — LOW (ref 39–50)
HGB BLD-MCNC: 7.9 G/DL — LOW (ref 13–17)
MAGNESIUM SERPL-MCNC: 2.2 MG/DL — SIGNIFICANT CHANGE UP (ref 1.6–2.6)
MCHC RBC-ENTMCNC: 29.4 PG — SIGNIFICANT CHANGE UP (ref 27–34)
MCHC RBC-ENTMCNC: 29.5 G/DL — LOW (ref 32–36)
MCV RBC AUTO: 99.6 FL — SIGNIFICANT CHANGE UP (ref 80–100)
NRBC BLD AUTO-RTO: 0 /100 WBCS — SIGNIFICANT CHANGE UP (ref 0–0)
PHOSPHATE SERPL-MCNC: 4.4 MG/DL — SIGNIFICANT CHANGE UP (ref 2.5–4.5)
PLATELET # BLD AUTO: 330 K/UL — SIGNIFICANT CHANGE UP (ref 150–400)
POTASSIUM SERPL-MCNC: 4.3 MMOL/L — SIGNIFICANT CHANGE UP (ref 3.5–5.3)
POTASSIUM SERPL-SCNC: 4.3 MMOL/L — SIGNIFICANT CHANGE UP (ref 3.5–5.3)
RBC # BLD: 2.69 M/UL — LOW (ref 4.2–5.8)
RBC # FLD: 16.5 % — HIGH (ref 10.3–14.5)
SODIUM SERPL-SCNC: 143 MMOL/L — SIGNIFICANT CHANGE UP (ref 135–145)
SURGICAL PATHOLOGY STUDY: SIGNIFICANT CHANGE UP
WBC # BLD: 6.62 K/UL — SIGNIFICANT CHANGE UP (ref 3.8–10.5)
WBC # FLD AUTO: 6.62 K/UL — SIGNIFICANT CHANGE UP (ref 3.8–10.5)

## 2025-08-27 RX ORDER — OXYCODONE HYDROCHLORIDE 30 MG/1
1 TABLET ORAL
Qty: 24 | Refills: 0
Start: 2025-08-27

## 2025-08-27 RX ADMIN — AMIODARONE HYDROCHLORIDE 200 MILLIGRAM(S): 50 INJECTION, SOLUTION INTRAVENOUS at 06:04

## 2025-08-27 RX ADMIN — Medication 25 GRAM(S): at 06:03

## 2025-08-27 RX ADMIN — Medication 975 MILLIGRAM(S): at 11:20

## 2025-08-27 RX ADMIN — Medication 975 MILLIGRAM(S): at 06:04

## 2025-08-27 RX ADMIN — HEPARIN SODIUM 5000 UNIT(S): 1000 INJECTION INTRAVENOUS; SUBCUTANEOUS at 06:05

## 2025-08-27 RX ADMIN — Medication 975 MILLIGRAM(S): at 00:15

## 2025-08-28 ENCOUNTER — EMERGENCY (EMERGENCY)
Facility: HOSPITAL | Age: 65
LOS: 0 days | Discharge: HOME | End: 2025-08-28
Attending: STUDENT IN AN ORGANIZED HEALTH CARE EDUCATION/TRAINING PROGRAM
Payer: MEDICARE

## 2025-08-28 ENCOUNTER — APPOINTMENT (OUTPATIENT)
Dept: RADIATION ONCOLOGY | Facility: CLINIC | Age: 65
End: 2025-08-28

## 2025-08-28 VITALS
HEART RATE: 62 BPM | DIASTOLIC BLOOD PRESSURE: 96 MMHG | RESPIRATION RATE: 18 BRPM | SYSTOLIC BLOOD PRESSURE: 123 MMHG | TEMPERATURE: 98 F | OXYGEN SATURATION: 100 %

## 2025-08-28 VITALS — HEIGHT: 73 IN

## 2025-08-28 DIAGNOSIS — R42 DIZZINESS AND GIDDINESS: ICD-10-CM

## 2025-08-28 DIAGNOSIS — Z90.3 ACQUIRED ABSENCE OF STOMACH [PART OF]: Chronic | ICD-10-CM

## 2025-08-28 DIAGNOSIS — Z87.2 PERSONAL HISTORY OF DISEASES OF THE SKIN AND SUBCUTANEOUS TISSUE: ICD-10-CM

## 2025-08-28 DIAGNOSIS — Z98.890 OTHER SPECIFIED POSTPROCEDURAL STATES: Chronic | ICD-10-CM

## 2025-08-28 DIAGNOSIS — Z90.89 ACQUIRED ABSENCE OF OTHER ORGANS: Chronic | ICD-10-CM

## 2025-08-28 DIAGNOSIS — Z92.89 PERSONAL HISTORY OF OTHER MEDICAL TREATMENT: Chronic | ICD-10-CM

## 2025-08-28 DIAGNOSIS — L76.22 POSTPROCEDURAL HEMORRHAGE OF SKIN AND SUBCUTANEOUS TISSUE FOLLOWING OTHER PROCEDURE: ICD-10-CM

## 2025-08-28 LAB
ALBUMIN SERPL ELPH-MCNC: 2.3 G/DL — LOW (ref 3.3–5)
ALP SERPL-CCNC: 92 U/L — SIGNIFICANT CHANGE UP (ref 40–120)
ALT FLD-CCNC: 21 U/L — SIGNIFICANT CHANGE UP (ref 12–78)
ANION GAP SERPL CALC-SCNC: 1 MMOL/L — LOW (ref 5–17)
APTT BLD: 33.5 SEC — SIGNIFICANT CHANGE UP (ref 26.1–36.8)
AST SERPL-CCNC: 22 U/L — SIGNIFICANT CHANGE UP (ref 15–37)
BASOPHILS # BLD AUTO: 0.06 K/UL — SIGNIFICANT CHANGE UP (ref 0–0.2)
BASOPHILS NFR BLD AUTO: 0.6 % — SIGNIFICANT CHANGE UP (ref 0–2)
BILIRUB SERPL-MCNC: 0.2 MG/DL — SIGNIFICANT CHANGE UP (ref 0.2–1.2)
BLD GP AB SCN SERPL QL: SIGNIFICANT CHANGE UP
BUN SERPL-MCNC: 17 MG/DL — SIGNIFICANT CHANGE UP (ref 7–23)
CALCIUM SERPL-MCNC: 8.1 MG/DL — LOW (ref 8.5–10.1)
CHLORIDE SERPL-SCNC: 106 MMOL/L — SIGNIFICANT CHANGE UP (ref 96–108)
CO2 SERPL-SCNC: 32 MMOL/L — HIGH (ref 22–31)
CREAT SERPL-MCNC: 0.83 MG/DL — SIGNIFICANT CHANGE UP (ref 0.5–1.3)
CULTURE RESULTS: ABNORMAL
EGFR: 96 ML/MIN/1.73M2 — SIGNIFICANT CHANGE UP
EGFR: 96 ML/MIN/1.73M2 — SIGNIFICANT CHANGE UP
EOSINOPHIL # BLD AUTO: 0.25 K/UL — SIGNIFICANT CHANGE UP (ref 0–0.5)
EOSINOPHIL NFR BLD AUTO: 2.4 % — SIGNIFICANT CHANGE UP (ref 0–6)
GLUCOSE SERPL-MCNC: 115 MG/DL — HIGH (ref 70–99)
GRAM STN FLD: ABNORMAL
HCT VFR BLD CALC: 27 % — LOW (ref 39–50)
HGB BLD-MCNC: 8.3 G/DL — LOW (ref 13–17)
IMM GRANULOCYTES # BLD AUTO: 0.13 K/UL — HIGH (ref 0–0.07)
IMM GRANULOCYTES NFR BLD AUTO: 1.3 % — HIGH (ref 0–0.9)
INR BLD: 1.1 RATIO — SIGNIFICANT CHANGE UP (ref 0.85–1.16)
LYMPHOCYTES # BLD AUTO: 1.89 K/UL — SIGNIFICANT CHANGE UP (ref 1–3.3)
LYMPHOCYTES NFR BLD AUTO: 18.5 % — SIGNIFICANT CHANGE UP (ref 13–44)
MCHC RBC-ENTMCNC: 30.2 PG — SIGNIFICANT CHANGE UP (ref 27–34)
MCHC RBC-ENTMCNC: 30.7 G/DL — LOW (ref 32–36)
MCV RBC AUTO: 98.2 FL — SIGNIFICANT CHANGE UP (ref 80–100)
MONOCYTES # BLD AUTO: 0.72 K/UL — SIGNIFICANT CHANGE UP (ref 0–0.9)
MONOCYTES NFR BLD AUTO: 7 % — SIGNIFICANT CHANGE UP (ref 2–14)
NEUTROPHILS # BLD AUTO: 7.19 K/UL — SIGNIFICANT CHANGE UP (ref 1.8–7.4)
NEUTROPHILS NFR BLD AUTO: 70.2 % — SIGNIFICANT CHANGE UP (ref 43–77)
NRBC # BLD AUTO: 0 K/UL — SIGNIFICANT CHANGE UP (ref 0–0)
NRBC # FLD: 0 K/UL — SIGNIFICANT CHANGE UP (ref 0–0)
NRBC BLD AUTO-RTO: 0 /100 WBCS — SIGNIFICANT CHANGE UP (ref 0–0)
PLATELET # BLD AUTO: 403 K/UL — HIGH (ref 150–400)
PMV BLD: 8.1 FL — SIGNIFICANT CHANGE UP (ref 7–13)
POTASSIUM SERPL-MCNC: 3.7 MMOL/L — SIGNIFICANT CHANGE UP (ref 3.5–5.3)
POTASSIUM SERPL-SCNC: 3.7 MMOL/L — SIGNIFICANT CHANGE UP (ref 3.5–5.3)
PROT SERPL-MCNC: 6.9 GM/DL — SIGNIFICANT CHANGE UP (ref 6–8.3)
PROTHROM AB SERPL-ACNC: 12.8 SEC — SIGNIFICANT CHANGE UP (ref 9.9–13.4)
RBC # BLD: 2.75 M/UL — LOW (ref 4.2–5.8)
RBC # FLD: 16.5 % — HIGH (ref 10.3–14.5)
SODIUM SERPL-SCNC: 139 MMOL/L — SIGNIFICANT CHANGE UP (ref 135–145)
SPECIMEN SOURCE: SIGNIFICANT CHANGE UP
WBC # BLD: 10.24 K/UL — SIGNIFICANT CHANGE UP (ref 3.8–10.5)
WBC # FLD AUTO: 10.24 K/UL — SIGNIFICANT CHANGE UP (ref 3.8–10.5)

## 2025-08-28 PROCEDURE — 99284 EMERGENCY DEPT VISIT MOD MDM: CPT | Mod: 25

## 2025-08-28 PROCEDURE — 93005 ELECTROCARDIOGRAM TRACING: CPT

## 2025-08-28 PROCEDURE — 85025 COMPLETE CBC W/AUTO DIFF WBC: CPT

## 2025-08-28 PROCEDURE — 85730 THROMBOPLASTIN TIME PARTIAL: CPT

## 2025-08-28 PROCEDURE — 99285 EMERGENCY DEPT VISIT HI MDM: CPT

## 2025-08-28 PROCEDURE — 86901 BLOOD TYPING SEROLOGIC RH(D): CPT

## 2025-08-28 PROCEDURE — 36415 COLL VENOUS BLD VENIPUNCTURE: CPT

## 2025-08-28 PROCEDURE — 86850 RBC ANTIBODY SCREEN: CPT

## 2025-08-28 PROCEDURE — 85610 PROTHROMBIN TIME: CPT

## 2025-08-28 PROCEDURE — 86900 BLOOD TYPING SEROLOGIC ABO: CPT

## 2025-08-28 PROCEDURE — 99283 EMERGENCY DEPT VISIT LOW MDM: CPT

## 2025-08-28 PROCEDURE — 93010 ELECTROCARDIOGRAM REPORT: CPT

## 2025-08-28 PROCEDURE — 80053 COMPREHEN METABOLIC PANEL: CPT

## 2025-08-28 PROCEDURE — 12001 RPR S/N/AX/GEN/TRNK 2.5CM/<: CPT

## 2025-08-28 RX ADMIN — Medication 500 MILLILITER(S): at 19:40

## 2025-09-03 DIAGNOSIS — E78.5 HYPERLIPIDEMIA, UNSPECIFIED: ICD-10-CM

## 2025-09-03 DIAGNOSIS — I10 ESSENTIAL (PRIMARY) HYPERTENSION: ICD-10-CM

## 2025-09-03 DIAGNOSIS — I48.20 CHRONIC ATRIAL FIBRILLATION, UNSPECIFIED: ICD-10-CM

## 2025-09-03 DIAGNOSIS — Z92.3 PERSONAL HISTORY OF IRRADIATION: ICD-10-CM

## 2025-09-03 DIAGNOSIS — G47.30 SLEEP APNEA, UNSPECIFIED: ICD-10-CM

## 2025-09-03 DIAGNOSIS — L73.2 HIDRADENITIS SUPPURATIVA: ICD-10-CM

## 2025-09-03 DIAGNOSIS — Z85.46 PERSONAL HISTORY OF MALIGNANT NEOPLASM OF PROSTATE: ICD-10-CM

## 2025-09-03 DIAGNOSIS — Z79.01 LONG TERM (CURRENT) USE OF ANTICOAGULANTS: ICD-10-CM

## 2025-09-03 DIAGNOSIS — Z92.21 PERSONAL HISTORY OF ANTINEOPLASTIC CHEMOTHERAPY: ICD-10-CM

## 2025-09-08 ENCOUNTER — APPOINTMENT (OUTPATIENT)
Dept: SURGERY | Facility: CLINIC | Age: 65
End: 2025-09-08
Payer: MEDICARE

## 2025-09-08 VITALS
DIASTOLIC BLOOD PRESSURE: 63 MMHG | TEMPERATURE: 97.1 F | BODY MASS INDEX: 39.1 KG/M2 | HEART RATE: 76 BPM | RESPIRATION RATE: 18 BRPM | OXYGEN SATURATION: 99 % | WEIGHT: 295 LBS | HEIGHT: 73 IN | SYSTOLIC BLOOD PRESSURE: 104 MMHG

## 2025-09-08 PROCEDURE — 99024 POSTOP FOLLOW-UP VISIT: CPT

## (undated) DEVICE — MASK O2 ADLT POM ELITE W/ MED AND HI CONCEN M TO M 10FT

## (undated) DEVICE — MASK O2 NON REBREATH 3IN1 ADULT

## (undated) DEVICE — POSITIONER FOAM HEAD CRADLE (PINK)

## (undated) DEVICE — GLV 8 PROTEXIS (WHITE)

## (undated) DEVICE — WARMING BLANKET UPPER ADULT

## (undated) DEVICE — CATH IV SAFE BC 20G X 1.16" (PINK)

## (undated) DEVICE — POLY TRAP ETRAP

## (undated) DEVICE — SET IV PUMP BLOOD 1VALVE 180FILTER NON-DEHP

## (undated) DEVICE — SNARE CAPTIVATOR II RND COLD 10MM

## (undated) DEVICE — TUBING CANNULA SALTER LABS NASAL ADULT 7FT

## (undated) DEVICE — PACK CYSTO

## (undated) DEVICE — MEDICATION LABELS W MARKER

## (undated) DEVICE — VALVE BIOPSY

## (undated) DEVICE — WARMING BLANKET LOWER ADULT

## (undated) DEVICE — FORCEP RADIAL JAW 4 W NDL 2.4MM 2.8MM 240CM ORANGE DISP

## (undated) DEVICE — MARKER ENDO SPOT EX

## (undated) DEVICE — Device

## (undated) DEVICE — GRID BRACHYTHERAPY EZ 18G

## (undated) DEVICE — TRAP SPECIMEN SPUTUM 40CC

## (undated) DEVICE — PACK IV START WITH CHG

## (undated) DEVICE — VENODYNE/SCD SLEEVE CALF LARGE

## (undated) DEVICE — SYR IV POSIFLUSH NS 3ML 30/TY

## (undated) DEVICE — SNARE POLYP SENS 27MM 240CM

## (undated) DEVICE — TUBING SUCTION CONN 6FT STERILE

## (undated) DEVICE — ENDOCUFF VISION SZ 3 SM PRPL

## (undated) DEVICE — VENODYNE/SCD SLEEVE CALF MEDIUM

## (undated) DEVICE — SENSOR O2 FINGER XL ADULT 24/BX 6BX/CA

## (undated) DEVICE — SUCTION YANKAUER TAPERED BULBOUS NO VENT

## (undated) DEVICE — NDL SPINAL 20G X 6" QUINCKE

## (undated) DEVICE — FRA-ESU BOVIE FORCE FX F3B25828A: Type: DURABLE MEDICAL EQUIPMENT

## (undated) DEVICE — PACK MINOR

## (undated) DEVICE — CLAMP BX HOT RAD JAW 3

## (undated) DEVICE — SOL IRR POUR H2O 500ML

## (undated) DEVICE — POSITIONER PATIENT SAFETY STRAP 3X60"

## (undated) DEVICE — CANISTER SUCTION 1200CC 10/SL

## (undated) DEVICE — SPECIMEN CONTAINER 100ML

## (undated) DEVICE — SYR LUER SLIP TIP 50CC

## (undated) DEVICE — SOL IRR NS 0.9% 250ML

## (undated) DEVICE — CATH ELECHMSTAT  INJ 7FR 210CM

## (undated) DEVICE — NDL INJ SCLERO INTERJECT 23G

## (undated) DEVICE — DRSG STERISTRIPS 0.5 X 4"

## (undated) DEVICE — DRAPE TOWEL BLUE 17" X 24"

## (undated) DEVICE — TUBING IV SET GRAVITY 3Y 100" MACRO

## (undated) DEVICE — SOL IRR POUR NS 0.9% 500ML

## (undated) DEVICE — CATH IV SAFE BC 22G X 1" (BLUE)

## (undated) DEVICE — NDL MAX CORE 18G X 25CM

## (undated) DEVICE — PLATE NESSY 170

## (undated) DEVICE — FORMALIN CUPS 10% BUFFERED

## (undated) DEVICE — GLV 7.5 PROTEXIS (WHITE)

## (undated) DEVICE — CATH ELCTR GLIDE PRB 7FR

## (undated) DEVICE — SNARE LRG

## (undated) DEVICE — SYR LUER SLIP TIP 30CC

## (undated) DEVICE — DRSG MASTISOL

## (undated) DEVICE — STERIS DEFENDO 3-PIECE KIT (AIR/WATER, SUCTION & BIOPSY VALVES)

## (undated) DEVICE — BALLOON ENDOCAVITY 2X14CM

## (undated) DEVICE — ENDOCUFF VISION SZ 2 LG GRN

## (undated) DEVICE — SOL IRR POUR H2O 250ML

## (undated) DEVICE — FORCEP RADIAL JAW 4 JUMBO 2.8MM 3.2MM 240CM ORANGE DISP

## (undated) DEVICE — TUBING IV SET SECONDARY 34"